# Patient Record
Sex: FEMALE | Race: WHITE | NOT HISPANIC OR LATINO | Employment: OTHER | URBAN - METROPOLITAN AREA
[De-identification: names, ages, dates, MRNs, and addresses within clinical notes are randomized per-mention and may not be internally consistent; named-entity substitution may affect disease eponyms.]

---

## 2017-06-22 ENCOUNTER — OFFICE VISIT (OUTPATIENT)
Dept: FAMILY MEDICINE | Facility: CLINIC | Age: 57
End: 2017-06-22

## 2017-06-22 VITALS
HEIGHT: 68 IN | TEMPERATURE: 98.5 F | HEART RATE: 90 BPM | SYSTOLIC BLOOD PRESSURE: 124 MMHG | DIASTOLIC BLOOD PRESSURE: 86 MMHG | WEIGHT: 180.8 LBS | OXYGEN SATURATION: 99 % | BODY MASS INDEX: 27.4 KG/M2

## 2017-06-22 DIAGNOSIS — R39.15 URINARY URGENCY: ICD-10-CM

## 2017-06-22 DIAGNOSIS — Z00.00 ROUTINE HISTORY AND PHYSICAL EXAMINATION OF ADULT: Primary | ICD-10-CM

## 2017-06-22 DIAGNOSIS — R07.0 THROAT PAIN: ICD-10-CM

## 2017-06-22 DIAGNOSIS — E03.9 ACQUIRED HYPOTHYROIDISM: ICD-10-CM

## 2017-06-22 LAB
% GRANULOCYTES: 59.4 %
HCT VFR BLD AUTO: 39.4 % (ref 35–47)
HEMOGLOBIN: 13.5 G/DL (ref 11.7–15.7)
LYMPHOCYTES NFR BLD AUTO: 32.1 %
MCH RBC QN AUTO: 33.3 PG (ref 26–33)
MCHC RBC AUTO-ENTMCNC: 34.3 G/DL (ref 31–36)
MCV RBC AUTO: 97.4 FL (ref 78–100)
MONOCYTES NFR BLD AUTO: 8.5 %
PLATELET COUNT - QUEST: 170 10^9/L (ref 150–375)
RBC # BLD AUTO: 4.05 10*12/L (ref 3.8–5.2)
S PYO AG THROAT QL IA.RAPID: NORMAL
WBC # BLD AUTO: 9.4 10*9/L (ref 4–11)

## 2017-06-22 PROCEDURE — 87070 CULTURE OTHR SPECIMN AEROBIC: CPT | Performed by: FAMILY MEDICINE

## 2017-06-22 PROCEDURE — 84443 ASSAY THYROID STIM HORMONE: CPT | Mod: 90 | Performed by: FAMILY MEDICINE

## 2017-06-22 PROCEDURE — 88142 CYTOPATH C/V THIN LAYER: CPT | Mod: 90 | Performed by: FAMILY MEDICINE

## 2017-06-22 PROCEDURE — 80048 BASIC METABOLIC PNL TOTAL CA: CPT | Mod: 90 | Performed by: FAMILY MEDICINE

## 2017-06-22 PROCEDURE — 99396 PREV VISIT EST AGE 40-64: CPT | Performed by: FAMILY MEDICINE

## 2017-06-22 PROCEDURE — 87880 STREP A ASSAY W/OPTIC: CPT | Performed by: FAMILY MEDICINE

## 2017-06-22 PROCEDURE — 36415 COLL VENOUS BLD VENIPUNCTURE: CPT | Performed by: FAMILY MEDICINE

## 2017-06-22 PROCEDURE — 84439 ASSAY OF FREE THYROXINE: CPT | Mod: 90 | Performed by: FAMILY MEDICINE

## 2017-06-22 PROCEDURE — 85025 COMPLETE CBC W/AUTO DIFF WBC: CPT | Performed by: FAMILY MEDICINE

## 2017-06-22 NOTE — PATIENT INSTRUCTIONS
Advance Care Planning and Health Care Directives  [place] at [facility] at [phone] or toll free [phone]

## 2017-06-22 NOTE — NURSING NOTE
"Patricia Ramirez is here for a CPX. Fasting: .    Pre-visit Planning  Immunizations -up to date  Colonoscopy -is up to date  Mammogram -is up to date  Asthma test --NA  PHQ2 -is completed today  ANNA 7 -NA  Fall Risk Assessment -NA  CAGE: Compelted Today  Vitals:  BP Cuff left  Arm with regular Cuff  PULSE regular  180 lbs 12.8 oz and 5' 7.5\"  CLASSIFICATION OF OVERWEIGHT AND OBESITY BY BMI                        Obesity Class           BMI(kg/m2)  Underweight                                    < 18.5  Normal                                         18.5-24.9  Overweight                                     25.0-29.9  OBESITY                     I                  30.0-34.9                             II                 35.0-39.9  EXTREME OBESITY             III                >40      Patient's  BMI Body mass index is 27.9 kg/(m^2).      Roomed By: GABRIEL Saleh ([facility])    "

## 2017-06-22 NOTE — PROGRESS NOTES
SUBJECTIVE: Patricia Ramirez is an 56 year old postmenopausal woman who presents for annual gyn exam.   Patient's last menstrual period was 06/20/2010.   Age of menopause: 49   Four normal pregnancies, uncomplicated with normal vaginal deliveries.   History of abnormal Pap smear: no   Regular self breast exam: Yes   History of abnormal mammogram: no       Besides routine health maintenance,  she would like to discuss      Sore throat for three days- rapid strep   Right eye conjunctivitis.   Hypothyroidism- neg AB- recheck thyroid function tests    Health Care Maintenance  Colonoscopy 2009  Mammogram 11/2009  dexa : none to date  Immunizations:up to date      Healthy Habits:  Do you get at least three servings of calcium containing foods daily (dairy, green leafy vegetables, etc.)? yes  Outside of work or daily activities, how many days per week do you exercise for 30 minutes or longer? Regular exercise and activity- biking, walking, mowing  Have you had an eye exam in the past two years? yes  Do you see a dentist twice per year? yes    PHQ-2  Over the last two weeks- Have you been bothered by little interest or pleasure in doing things?  No  Over the last two weeks- Have you been feeling down, depressed, or hopeless?  No  Frustrated with work/     Advanced directive:  encouraged    Social History   Substance Use Topics     Smoking status: Former Smoker     Smokeless tobacco: Never Used      Comment: quit 25-30 ago     Alcohol use 0.0 oz/week     0 drink(s) per week      Comment: social      Centrum silver- no additional   Reviewed orders with patient.  Reviewed health maintenance and updated orders accordingly - Yes      History of abnormal Pap smear: NO - age 30- 65 PAP every 3 years recommended  All Histories reviewed and updated in Epic.      ROS:  C: NEGATIVE for fever, chills, change in weight  I: NEGATIVE for worrisome rashes, moles or lesions  E: NEGATIVE for vision changes or irritation  ENT:  "conjunctivitis, sore throat  R: NEGATIVE for significant cough or SOB  B: NEGATIVE for masses, tenderness or discharge  CV: NEGATIVE for chest pain, palpitations or peripheral edema  GI: NEGATIVE for nausea, abdominal pain, heartburn, or change in bowel habits  : urinary urgency- requests refill of oxybutynin   No vaginal bleeding.  M: NEGATIVE for significant arthralgias or myalgia  N: NEGATIVE for weakness, dizziness or paresthesias  P: NEGATIVE for changes in mood or affect   Endo: weight is stable.    OBJECTIVE:  /86 (BP Location: Left arm, Patient Position: Chair, Cuff Size: Adult Regular)  Pulse 90  Temp 98.5  F (36.9  C) (Oral)  Ht 1.715 m (5' 7.5\")  Wt 82 kg (180 lb 12.8 oz)  LMP 06/20/2010  SpO2 99%  Breastfeeding? No  BMI 27.9 kg/m2  General appearance: Healthy    Skin: Normal. No atypical appearing moles on inspection of trunk and extremities.    Eye: right eye conjunctival redness    External ears  and canals clear bilaterally. TM's normal bilaterally. Nose normal without lesions or discharge. Oropharynx: erythema of the posterior pharynx. Neck supple without palpable adenopathy.    Breasts are symmetric.  No dominant, discrete, fixed  or suspicious masses are noted.  No skin or nipple changes or axillary nodes.      Regular rate and  rhythm. S1 and S2 normal, no murmurs, clicks, gallops or rubs. No edema or JVD. Chest is clear; no wheezes or rales.    The abdomen is soft without tenderness, guarding, mass or organomegaly. Bowel sounds are normal. No CVA tenderness or inguinal adenopathy noted.    Pelvic:  Vagina and vulva are normal.   No palpable uterine or adnexal masses or tenderness.  Pap obtained and pending.    Rectal exam: deferred    Extremities: negative.      COUNSELING:  Reviewed preventive health counseling, as reflected in patient instructions       Regular exercise       Osteoporosis Prevention/Bone Health       Advance Care Planning    ATP III Guidelines  ICSI Preventive " Guidelines    ASSESSMENT/PLAN:  (Z00.00) Routine history and physical examination of adult  (primary encounter diagnosis)  Comment:   Plan: TSH ([facility]), T4 free, VENOUS COLLECTION,         ThinPrep Pap and HPV (mRNA E6/E7)         ([facility]), BASIC METABOLIC PANEL ([facility])            (E03.9) Acquired hypothyroidism  Comment: normal TSH- Levothyroxine refilled at her current dose  Plan: TSH ([facility]), T4 free, VENOUS COLLECTION,         levothyroxine (SYNTHROID/LEVOTHROID) 50 MCG         tablet            (R07.0) Throat pain  Comment:   Plan: RAPID STREP (BFP), CL AFF HEMOGRAM/PLATE/DIFF         (BFP), THROAT CULTURE (BFP)            (R39.15) Urinary urgency  Comment:   Plan: oxybutynin (DITROPAN) 5 MG tablet

## 2017-06-22 NOTE — MR AVS SNAPSHOT
"              After Visit Summary   6/22/2017    Patricia Ramirez    MRN: [id number]           Patient Information     Date Of Birth          1960        Visit Information        Provider Department      6/22/2017 9:00 AM Savana Guillermo MD [facility]        Today's Diagnoses     Routine history and physical examination of adult    -  1    Acquired hypothyroidism        Throat pain        Urinary urgency          Care Instructions    Advance Care Planning and Health Care Directives  [place] at [facility] at [phone] or toll free [phone]             Follow-ups after your visit        Who to contact     If you have questions or need follow up information about today's clinic visit or your schedule please contact [facility] directly at [phone].  Normal or non-critical lab and imaging results will be communicated to you by MyChart, letter or phone within 4 business days after the clinic has received the results. If you do not hear from us within 7 days, please contact the clinic through MyChart or phone. If you have a critical or abnormal lab result, we will notify you by phone as soon as possible.  Submit refill requests through [employer] or call your pharmacy and they will forward the refill request to us. Please allow 3 business days for your refill to be completed.          Additional Information About Your Visit        MyChart Information     [employer] lets you send messages to your doctor, view your test results, renew your prescriptions, schedule appointments and more. To sign up, go to [email or web address] . Click on \"Log in\" on the left side of the screen, which will take you to the Welcome page. Then click on \"Sign up Now\" on the right side of the page.     You will be asked to enter the access code listed below, as well as some personal information. Please follow the directions to create your username and password.     Your " "access code is: [email or web address]  Expires: 2017 10:52 AM     Your access code will  in 90 days. If you need help or a new code, please call your [place] clinic or [phone].        Care EveryWhere ID     This is your Care EveryWhere ID. This could be used by other organizations to access your [place] medical records  [id number]        Your Vitals Were     Pulse Temperature Height Last Period Pulse Oximetry Breastfeeding?    90 98.5  F (36.9  C) (Oral) 1.715 m (5' 7.5\") 2010 99% No    BMI (Body Mass Index)                   27.9 kg/m2            Blood Pressure from Last 3 Encounters:   17 124/86   16 120/82   16 110/76    Weight from Last 3 Encounters:   17 82 kg (180 lb 12.8 oz)   16 81.8 kg (180 lb 6.4 oz)   16 81.3 kg (179 lb 3.2 oz)              We Performed the Following     BASIC METABOLIC PANEL ([facility])     CL AFF HEMOGRAM/PLATE/DIFF (B[facility])     RAPID STREP (BFP)     T4 free     ThinPrep Pap and HPV (mRNA E6/E7){HPV-REFLEX} ([facility])     THROAT CULTURE (BFP)     TSH ([facility])     VENOUS COLLECTION          Today's Medication Changes          These changes are accurate as of: 17 11:59 PM.  If you have any questions, ask your nurse or doctor.               Start taking these medicines.        Dose/Directions    oxybutynin 5 MG tablet   Commonly known as:  DITROPAN   Used for:  Urinary urgency   Started by:  Savana Guillermo MD        Dose:  5 mg   Take 1 tablet (5 mg) by mouth 2 times daily   Quantity:  180 tablet   Refills:  3            Where to get your medicines      These medications were sent to [facility] [address] [address] AT [facility] [address] [address], [facility] [address]    Hours:  24-hours Phone:  [phone]     levothyroxine 50 MCG tablet    oxybutynin 5 MG tablet                Primary Care Provider Office Phone # Fax #    Savana Guillermo [phone]       " [facility] [address]  [facility] [address]        Equal Access to Services     RAYMOND TYSON : Hadii juan manuel agee hadbrandto Sojuanali, waaxda luqadaha, qaybta kaalmada aderosemary, tavo linden milimarvin morenoaimee griceldajuju carole baez. So [facility] [phone].    ATENCIÓN: Si habla español, tiene a fatima disposición servicios gratuitos de asistencia lingüística. Llame al [phone].    We comply with applicable federal civil rights laws and [place] laws. We do not discriminate on the basis of race, color, national origin, age, disability sex, sexual orientation or gender identity.            Thank you!     Thank you for choosing [facility]  for your care. Our goal is always to provide you with excellent care. Hearing back from our patients is one way we can continue to improve our services. Please take a few minutes to complete the written survey that you may receive in the mail after your visit with us. Thank you!             Your Updated Medication List - Protect others around you: Learn how to safely use, store and throw away your medicines at www.disposemymeds.org.          This list is accurate as of: 6/22/17 11:59 PM.  Always use your most recent med list.                   Brand Name Dispense Instructions for use Diagnosis    clobetasol 0.05 % cream    TEMOVATE    60 g    Apply  topically 2 times daily. (should not be used on face or in groin)    Contact dermatitis due to poison ivy       Glucosamine HCl--250 MG Tabs      1 TABLET DAILY        levothyroxine 50 MCG tablet    SYNTHROID/LEVOTHROID    90 tablet    Take 1 tablet (50 mcg) by mouth daily    Acquired hypothyroidism       MULTIPLE vitamin  S/WOMENS Tabs      1 TABLET DAILY        oxybutynin 10 MG 24 hr tablet    DITROPAN-XL     1 TABLET DAILY        oxybutynin 5 MG tablet    DITROPAN    180 tablet    Take 1 tablet (5 mg) by mouth 2 times daily    Urinary urgency

## 2017-06-23 LAB
BUN SERPL-MCNC: 17 MG/DL (ref 7–25)
BUN/CREATININE RATIO: NORMAL (CALC) (ref 6–22)
CALCIUM SERPL-MCNC: 9.3 MG/DL (ref 8.6–10.4)
CHLORIDE SERPLBLD-SCNC: 108 MMOL/L (ref 98–110)
CO2 SERPL-SCNC: 26 MMOL/L (ref 20–31)
CREAT SERPL-MCNC: 0.95 MG/DL (ref 0.5–1.05)
EGFR AFRICAN AMERICAN - QUEST: 77 ML/MIN/1.73M2
GFR SERPL CREATININE-BSD FRML MDRD: 66 ML/MIN/1.73M2
GLUCOSE - QUEST: 97 MG/DL (ref 65–99)
POTASSIUM SERPL-SCNC: 4.3 MMOL/L (ref 3.5–5.3)
SODIUM SERPL-SCNC: 140 MMOL/L (ref 135–146)
T4, FREE, NON-DIALYSIS - QUEST: 1.2 NG/DL (ref 0.8–1.8)
TSH SERPL-ACNC: 2.45 MIU/L (ref 0.4–4.5)

## 2017-06-24 LAB — THROAT CULTURE: NORMAL

## 2017-06-26 RX ORDER — LEVOTHYROXINE SODIUM 50 UG/1
50 TABLET ORAL DAILY
Qty: 90 TABLET | Refills: 3 | Status: SHIPPED | OUTPATIENT
Start: 2017-06-26 | End: 2018-06-29

## 2017-06-26 RX ORDER — OXYBUTYNIN CHLORIDE 5 MG/1
5 TABLET ORAL 2 TIMES DAILY
Qty: 180 TABLET | Refills: 3 | Status: SHIPPED | OUTPATIENT
Start: 2017-06-26 | End: 2018-06-29

## 2017-06-27 LAB
CLINICAL HISTORY - QUEST: NORMAL
CYTOTECHNOLOGIST - QUEST: NORMAL
DESCRIPTIVE DIAGNOSIS - QUEST: NORMAL
LAST PAP DX - QUEST: NORMAL
LMP - QUEST: NORMAL
PREV BX DX - QUEST: NORMAL
SOURCE: NORMAL
STATEMENT OF ADEQUACY - QUEST: NORMAL

## 2017-07-05 DIAGNOSIS — E03.9 ACQUIRED HYPOTHYROIDISM: ICD-10-CM

## 2017-07-05 RX ORDER — LEVOTHYROXINE SODIUM 50 UG/1
TABLET ORAL
Qty: 90 TABLET | Refills: 0 | OUTPATIENT
Start: 2017-07-05

## 2017-12-04 ENCOUNTER — HOSPITAL ENCOUNTER (OUTPATIENT)
Dept: MAMMOGRAPHY | Facility: CLINIC | Age: 57
Discharge: HOME | End: 2017-12-04
Attending: FAMILY MEDICINE | Admitting: FAMILY MEDICINE
Payer: COMMERCIAL

## 2017-12-04 DIAGNOSIS — Z12.31 VISIT FOR SCREENING MAMMOGRAM: ICD-10-CM

## 2017-12-04 PROCEDURE — G0202 SCR MAMMO BI INCL CAD: HCPCS

## 2017-12-15 ENCOUNTER — ALLIED HEALTH/NURSE VISIT (OUTPATIENT)
Dept: FAMILY MEDICINE | Facility: CLINIC | Age: 57
End: 2017-12-15

## 2017-12-15 DIAGNOSIS — Z23 NEED FOR VACCINATION: Primary | ICD-10-CM

## 2017-12-15 PROCEDURE — 90686 IIV4 VACC NO PRSV 0.5 ML IM: CPT | Performed by: FAMILY MEDICINE

## 2017-12-15 PROCEDURE — 90471 IMMUNIZATION ADMIN: CPT | Performed by: FAMILY MEDICINE

## 2018-06-29 ENCOUNTER — OFFICE VISIT (OUTPATIENT)
Dept: FAMILY MEDICINE | Facility: CLINIC | Age: 58
End: 2018-06-29

## 2018-06-29 VITALS
HEIGHT: 68 IN | WEIGHT: 178 LBS | HEART RATE: 74 BPM | DIASTOLIC BLOOD PRESSURE: 84 MMHG | TEMPERATURE: 98.1 F | BODY MASS INDEX: 26.98 KG/M2 | OXYGEN SATURATION: 98 % | SYSTOLIC BLOOD PRESSURE: 126 MMHG

## 2018-06-29 DIAGNOSIS — E03.9 ACQUIRED HYPOTHYROIDISM: ICD-10-CM

## 2018-06-29 DIAGNOSIS — Z00.00 ROUTINE GENERAL MEDICAL EXAMINATION AT A HEALTH CARE FACILITY: Primary | ICD-10-CM

## 2018-06-29 DIAGNOSIS — Z80.3 FAMILY HISTORY OF MALIGNANT NEOPLASM OF BREAST: ICD-10-CM

## 2018-06-29 DIAGNOSIS — R39.15 URINARY URGENCY: ICD-10-CM

## 2018-06-29 LAB — GLUCOSE SERPL-MCNC: 94 MG/DL (ref 60–99)

## 2018-06-29 PROCEDURE — 36415 COLL VENOUS BLD VENIPUNCTURE: CPT | Performed by: FAMILY MEDICINE

## 2018-06-29 PROCEDURE — 84443 ASSAY THYROID STIM HORMONE: CPT | Mod: 90 | Performed by: FAMILY MEDICINE

## 2018-06-29 PROCEDURE — 84439 ASSAY OF FREE THYROXINE: CPT | Mod: 90 | Performed by: FAMILY MEDICINE

## 2018-06-29 PROCEDURE — 80061 LIPID PANEL: CPT | Mod: 90 | Performed by: FAMILY MEDICINE

## 2018-06-29 PROCEDURE — 99396 PREV VISIT EST AGE 40-64: CPT | Performed by: FAMILY MEDICINE

## 2018-06-29 PROCEDURE — 82947 ASSAY GLUCOSE BLOOD QUANT: CPT | Performed by: FAMILY MEDICINE

## 2018-06-29 RX ORDER — LEVOTHYROXINE SODIUM 50 UG/1
50 TABLET ORAL DAILY
Qty: 90 TABLET | Refills: 3 | Status: CANCELLED | OUTPATIENT
Start: 2018-06-29

## 2018-06-29 RX ORDER — OXYBUTYNIN CHLORIDE 5 MG/1
5 TABLET ORAL 2 TIMES DAILY
Qty: 180 TABLET | Refills: 3 | Status: CANCELLED | OUTPATIENT
Start: 2018-06-29

## 2018-06-29 NOTE — LETTER
July 2, 2018      Patricia Ramirez  [address] [facility] [address]        Dear ,    We are writing to inform you of your test results.    Normal thyroid function: Levothyroxine was refilled at your current dose  Normal lipid profile/ LDL has increased slightly from last year  Normal blood sugar    Resulted Orders   TSH ([facility])   Result Value Ref Range    TSH 2.55 0.40 - 4.50 mIU/L   T4 free   Result Value Ref Range    T4 Free, Non-Dialysis 1.4 0.8 - 1.8 ng/dL   Glucose Fasting (BFP)   Result Value Ref Range    Glucose 94 60 - 99 mg/dL   Lipid Profile   Result Value Ref Range    Cholesterol 177 <200 mg/dL    HDL Cholesterol 58 >50 mg/dL    Triglycerides 53 <150 mg/dL    LDL Cholesterol Calculated 105 (H) mg/dL (calc)      Comment:      Reference range: <100     Desirable range <100 mg/dL for primary prevention;    <70 mg/dL for patients with CHD or diabetic patients   with > or = 2 CHD risk factors.     LDL-C is now calculated using the Brian-Viky   calculation, which is a validated novel method providing   better accuracy than the Friedewald equation in the   estimation of LDL-C.   Brian SS et al. DENISSE. 2013;310(19): 3279-0619   (http://education.[employer].com/faq/DXH532)      Cholesterol/HDL Ratio 3.1 <5.0 (calc)    Non HDL Cholesterol 119 <130 mg/dL (calc)      Comment:      For patients with diabetes plus 1 major ASCVD risk   factor, treating to a non-HDL-C goal of <100 mg/dL   (LDL-C of <70 mg/dL) is considered a therapeutic   option.         If you have any questions or concerns, please call the clinic at the number listed above.       Sincerely,        Savana Guillermo MD

## 2018-06-29 NOTE — NURSING NOTE
Patient is here for a full physical exam.    Pre-Visit Screening :  Immunizations : up to date  Colon Screening : is up to date  Mammogram: up to date   Asthma Action Test/Plan : no concerns  PHQ9 :  PHQ-2 done today   GAD7 :  No concerns  Patient's  BMI Body mass index is 27.47 kg/(m^2).  Questioned patient about current smoking habits.  Pt. quit smoking some time ago.  OK to leave a detailed voice message regarding today's visit Yes, phone # [phone]      ETOH screening:  Questions:  1-How often do you have a drink containing alcohol?                             1 times per week(s)  2-How many drinks containing alcohol do you have on a typical day when you are         Drinking?                              1   3- How often do you have 5 or more drinks on one occasion?                              Never

## 2018-06-29 NOTE — MR AVS SNAPSHOT
After Visit Summary   6/29/2018    Patricia Ramirez    MRN: [id number]           Patient Information     Date Of Birth          1960        Visit Information        Provider Department      6/29/2018 9:00 AM Savana Guillermo MD [facility]        Today's Diagnoses     Routine general medical examination at a health care facility    -  1    Acquired hypothyroidism        Urinary urgency        Family history of malignant neoplasm of breast          Care Instructions    New Recombinant shingles vaccine (Shingrix): call your insurance for information on cost    Due for mammogram in December            Follow-ups after your visit        Additional Services     GENETICS REFERRAL       Your provider has referred you to   Genetic counseling:  Cheyenne Hernandez  [facility]  [address] [place] [id number]  [phone]   [email or web address]  Please be aware that coverage of these services is subject to the terms and limitations of your health insurance plan.  Call member services at your health plan with any benefit or coverage questions.      Please bring the following with you to your appointment:    (1) Any X-Rays, CTs or MRIs which have been performed.  Contact the facility where they were done to arrange for  prior to your scheduled appointment.   (2) List of current medications   (3) This referral request   (4) Any documents/labs given to you for this referral                  Who to contact     If you have questions or need follow up information about today's clinic visit or your schedule please contact [facility] directly at [phone].  Normal or non-critical lab and imaging results will be communicated to you by MyChart, letter or phone within 4 business days after the clinic has received the results. If you do not hear from us within 7 days, please contact the clinic through MyChart or phone. If you have a  "critical or abnormal lab result, we will notify you by phone as soon as possible.  Submit refill requests through [employer] or call your pharmacy and they will forward the refill request to us. Please allow 3 business days for your refill to be completed.          Additional Information About Your Visit        Care EveryWhere ID     This is your Care EveryWhere ID. This could be used by other organizations to access your [place] medical records  [id number]        Your Vitals Were     Pulse Temperature Height Last Period Pulse Oximetry BMI (Body Mass Index)    74 98.1  F (36.7  C) (Oral) 1.715 m (5' 7.5\") 06/20/2010 98% 27.47 kg/m2       Blood Pressure from Last 3 Encounters:   06/29/18 126/84   06/22/17 124/86   06/16/16 120/82    Weight from Last 3 Encounters:   06/29/18 80.7 kg (178 lb)   06/22/17 82 kg (180 lb 12.8 oz)   06/16/16 81.8 kg (180 lb 6.4 oz)              We Performed the Following     GENETICS REFERRAL     Glucose Fasting (BFP)     Lipid Profile     T4 free     TSH ([facility])     VENOUS COLLECTION          Today's Medication Changes          These changes are accurate as of 6/29/18  9:50 AM.  If you have any questions, ask your nurse or doctor.               Stop taking these medicines if you haven't already. Please contact your care team if you have questions.     oxybutynin 10 MG 24 hr tablet   Commonly known as:  DITROPAN-XL   Stopped by:  Savana Guillermo MD                    Primary Care Provider Office Phone # Fax #    Savana Guillermo [phone] [address] [place] [address]        Equal Access to Services     [facility]: Hadedmundo Lozano, elan yee, tavo oreilly. So [facility] [phone].    ATENCIÓN: Si habla español, tiene a fatima disposición servicios gratuitos de asistencia lingüística. Llame al [phone].    We comply with applicable federal civil rights laws and " [place] laws. We do not discriminate on the basis of race, color, national origin, age, disability, sex, sexual orientation, or gender identity.            Thank you!     Thank you for choosing [facility]  for your care. Our goal is always to provide you with excellent care. Hearing back from our patients is one way we can continue to improve our services. Please take a few minutes to complete the written survey that you may receive in the mail after your visit with us. Thank you!             Your Updated Medication List - Protect others around you: Learn how to safely use, store and throw away your medicines at www.disposemymeds.org.          This list is accurate as of 6/29/18  9:50 AM.  Always use your most recent med list.                   Brand Name Dispense Instructions for use Diagnosis    clobetasol 0.05 % cream    TEMOVATE    60 g    Apply  topically 2 times daily. (should not be used on face or in groin)    Contact dermatitis due to poison ivy       Glucosamine HCl--250 MG Tabs      1 TABLET DAILY        levothyroxine 50 MCG tablet    SYNTHROID/LEVOTHROID    90 tablet    Take 1 tablet (50 mcg) by mouth daily    Acquired hypothyroidism       MULTIPLE vitamin  S/WOMENS Tabs      1 TABLET DAILY        oxybutynin 5 MG tablet    DITROPAN    180 tablet    Take 1 tablet (5 mg) by mouth 2 times daily    Urinary urgency

## 2018-06-29 NOTE — PATIENT INSTRUCTIONS
New Recombinant shingles vaccine (Shingrix): call your insurance for information on cost    Due for mammogram in December

## 2018-06-29 NOTE — PROGRESS NOTES
Chief Complaint: Patricia Ramirez is an 57 year old woman who presents for preventive health visit.      Besides routine health maintenance,  she would like to discuss thyroid replacement.   She read an article in the tribune that low dose repalcement may not be indicated      Healthy Habits:  Do you get at least three servings of calcium containing foods daily (dairy, green leafy vegetables, etc.)? yes  Outside of work or daily activities, how many days per week do you exercise for 30 minutes or longer? Regular exercise- biking, walking  Have you had an eye exam in the past two years? yes  Do you see a dentist twice per year? yes    PHQ-2  Over the last two weeks- Have you been bothered by little interest or pleasure in doing things?  No  Over the last two weeks- Have you been feeling down, depressed, or hopeless?  No     Advanced directive:encouraged    Health Care Maintenance:  dexa scan: none todate  Social History   Substance Use Topics     Smoking status: Former Smoker     Smokeless tobacco: Never Used      Comment: quit 25-30 ago     Alcohol use 0.0 oz/week     0 drink(s) per week      Comment: social          Reviewed orders with patient.  Reviewed health maintenance and updated orders accordingly - Yes      History of abnormal Pap smear: NO - age 30- 65 PAP every 3 years recommended  All Histories reviewed and updated in [facility].      ROS:  CONSTITUTIONAL: NEGATIVE for fever, chills, change in weight  INTEGUMENTARY/SKIN: NEGATIVE for worrisome rashes, moles or lesions  Gets contact derm- poison ivy every spring  EYES: NEGATIVE for vision changes or irritation  ENT: NEGATIVE for ear, mouth and throat problems  RESP: NEGATIVE for significant cough or SOB  BREAST: NEGATIVE for masses, tenderness or discharge  CV: NEGATIVE for chest pain, palpitations or peripheral edema  GI: NEGATIVE for nausea, abdominal pain, heartburn, or change in bowel habits  : NEGATIVE for unusual urinary or vaginal symptoms. No vaginal  "bleeding.  MUSCULOSKELETAL: NEGATIVE for significant arthralgias or myalgia  NEURO: NEGATIVE for weakness, dizziness or paresthesias  PSYCHIATRIC: NEGATIVE for changes in mood or affect     No ibuprofen or aleve use-  Mammogram : due in December    Mother and grandmother with history of cancer (mother breast and kidney)- grandmother breast  She would like to see a genetic counselor      OBJECTIVE:  /84 (BP Location: Right arm, Patient Position: Sitting, Cuff Size: Adult Regular)  Pulse 74  Temp 98.1  F (36.7  C) (Oral)  Ht 1.715 m (5' 7.5\")  Wt 80.7 kg (178 lb)  LMP 06/20/2010  SpO2 98%  BMI 27.47 kg/m2  General appearance: Healthy    Skin: Normal. No atypical appearing moles on inspection of trunk and extremities.    External ears  and canals clear bilaterally. TM's normal bilaterally. Nose normal without lesions or discharge. Oropharynx normal. Neck supple without palpable adenopathy.    Breasts are symmetric.  No dominant, discrete, fixed  or suspicious masses are noted.  No skin or nipple changes or axillary nodes.     Regular rate and  rhythm. S1 and S2 normal, no murmurs, clicks, gallops or rubs. No edema or JVD. Chest is clear; no wheezes or rales.    The abdomen is soft without tenderness, guarding, mass or organomegaly. Bowel sounds are normal. No CVA tenderness or inguinal adenopathy noted.    Pelvic: Deferred    Rectal exam: deferred    Extremities: negative.      COUNSELING:  Reviewed preventive health counseling, as reflected in patient instructions  Special attention given to:        Regular exercise       Healthy diet/nutrition       Osteoporosis Prevention/Bone Health       Advance Care Planning    ATP III Guidelines  ICSI Preventive Guidelines    ASSESSMENT/PLAN:  (Z00.00) Routine general medical examination at a health care facility  (primary encounter diagnosis)  Comment:   Plan: TSH ([facility]), T4 free, VENOUS COLLECTION,         Glucose Fasting (BFP), Lipid Profile            (E03.9) " Acquired hypothyroidism  Comment:   Plan: TSH ([facility]), T4 free, VENOUS COLLECTION,         Glucose Fasting (BFP), levothyroxine         (SYNTHROID/LEVOTHROID) 50 MCG tablet            (R39.15) Urinary urgency  Comment:   Plan: oxybutynin (DITROPAN) 5 MG tablet            (Z80.3) Family history of malignant neoplasm of breast  Comment:   Plan: GENETICS REFERRAL

## 2018-06-30 LAB
CHOLEST SERPL-MCNC: 177 MG/DL
CHOLEST/HDLC SERPL: 3.1 (CALC)
HDLC SERPL-MCNC: 58 MG/DL
LDLC SERPL CALC-MCNC: 105 MG/DL (CALC)
NONHDLC SERPL-MCNC: 119 MG/DL (CALC)
T4, FREE, NON-DIALYSIS - QUEST: 1.4 NG/DL (ref 0.8–1.8)
TRIGL SERPL-MCNC: 53 MG/DL
TSH SERPL-ACNC: 2.55 MIU/L (ref 0.4–4.5)

## 2018-07-02 RX ORDER — LEVOTHYROXINE SODIUM 50 UG/1
50 TABLET ORAL DAILY
Qty: 90 TABLET | Refills: 3 | Status: SHIPPED | OUTPATIENT
Start: 2018-07-02 | End: 2019-07-05

## 2018-07-02 RX ORDER — OXYBUTYNIN CHLORIDE 5 MG/1
5 TABLET ORAL 2 TIMES DAILY
Qty: 180 TABLET | Refills: 3 | Status: SHIPPED | OUTPATIENT
Start: 2018-07-02 | End: 2019-07-05

## 2018-10-05 ENCOUNTER — ALLIED HEALTH/NURSE VISIT (OUTPATIENT)
Dept: FAMILY MEDICINE | Facility: CLINIC | Age: 58
End: 2018-10-05

## 2018-10-05 DIAGNOSIS — Z23 NEED FOR VACCINATION: Primary | ICD-10-CM

## 2018-10-05 PROCEDURE — 90471 IMMUNIZATION ADMIN: CPT | Performed by: FAMILY MEDICINE

## 2018-10-05 PROCEDURE — 90686 IIV4 VACC NO PRSV 0.5 ML IM: CPT | Performed by: FAMILY MEDICINE

## 2018-10-05 NOTE — MR AVS SNAPSHOT
After Visit Summary   10/5/2018    Patricia Ramirez    MRN: [id number]           Patient Information     Date Of Birth          1960        Visit Information        Provider Department      10/5/2018 1:30 PM Savana Guillermo MD [facility]        Today's Diagnoses     Need for vaccination    -  1       Follow-ups after your visit        Your next 10 appointments already scheduled     Dec 05, 2018  8:45 AM CST   MA SCREENING DIGITAL BILATERAL with [id number]   [facility] ([facility])    [address], [address]  [facility] [address]   [phone]           How do I prepare for my exam? (Food and drink instructions) No Food and Drink Restrictions.  How do I prepare for my exam? (Other instructions) Do not use any powder, lotion or deodorant under your arms or on your breast. If you do, we will ask you to remove it before your exam.  What should I wear: Wear comfortable, two-piece clothing.  How long does the exam take: Most scans will take 15 minutes.  What should I bring: Bring any previous mammograms from other facilities or have them mailed to the breast center.  Do I need a :  No  is needed.  What do I need to tell my doctor: If you have any allergies, tell your care team.  What should I do after the exam: No restrictions, You may resume normal activities.  What is this test: This test is an x-ray of the breast to look for breast disease. The breast is pressed between two plates to flatten and spread the tissue. An X-ray is taken of the breast from different angles.  Who should I call with questions: If you have any questions, please call the Imaging Department where you will have your exam. Directions, parking instructions, and other information is available on our website, [email or web address].  Other information about my exam Three-dimensional (3D) mammograms are available at [facility] locations in [facility]  "[facility], [facility], [place], [facility] and [place].  Health locations include [place] and [facility] in [place].  Benefits of 3D mammograms include * Improved rate of cancer detection * Decreases your chance of having to go back for more tests, which means fewer: * \"False-positive\" results (This means that there is an abnormal area but it isn't cancer.) * Invasive testing procedures, such as a biopsy or surgery * Can provide clearer images of the breast if you have dense breast tissue.  *3D mammography is an optional exam that anyone can have with a 2D mammogram. It doesn't replace or take the place of a 2D mammogram. 2D mammograms remain an effective screening test for all women.  Not all insurance companies cover the cost of a 3D mammogram. Check with your insurance. Three-dimensional (3D) mammograms are available at [facility] locations in [facility], [facility], [facility], [place], [place], and [place]. Health locations include [place] and [facility] in [place]. Benefits of 3D mammograms include: - Improved rate of cancer detection - Decreases your chance of having to go back for more tests, which means fewer: - \"False-positive\" results (This means that there is an abnormal area but it isn't cancer.) - Invasive testing procedures, such as a biopsy or surgery - Can provide clearer images of the breast if you have dense breast tissue. 3D mammography is an optional exam that anyone can have with a 2D mammogram. It doesn't replace or take the place of a 2D mammogram. 2D mammograms remain an effective screening test for all women.  Not all insurance companies cover the cost of a 3D mammogram. Check with your insurance.              Who to contact     If you have questions or need follow up information about today's clinic visit or your schedule please contact [facility] directly at " [phone].  Normal or non-critical lab and imaging results will be communicated to you by MyChart, letter or phone within 4 business days after the clinic has received the results. If you do not hear from us within 7 days, please contact the clinic through MyChart or phone. If you have a critical or abnormal lab result, we will notify you by phone as soon as possible.  Submit refill requests through [employer] ([employer])hart or call your pharmacy and they will forward the refill request to us. Please allow 3 business days for your refill to be completed.          Additional Information About Your Visit        Care EveryWhere ID     This is your Care EveryWhere ID. This could be used by other organizations to access your [facility] medical records  [id number]        Your Vitals Were     Last Period                   06/20/2010            Blood Pressure from Last 3 Encounters:   06/29/18 126/84   06/22/17 124/86   06/16/16 120/82    Weight from Last 3 Encounters:   06/29/18 80.7 kg (178 lb)   06/22/17 82 kg (180 lb 12.8 oz)   06/16/16 81.8 kg (180 lb 6.4 oz)              We Performed the Following     HC FLU VAC PRESRV FREE QUAD SPLIT VIR 3+YRS IM     VACCINE ADMINISTRATION, INITIAL        Primary Care Provider Office Phone # Fax #    Savana Guillermo [phone]       [address] [address]        Equal Access to Services     [facility] : Hadii aad ku hadasho Sojuanali, waaxda luqadaha, qaybta kaalmada adeaimeeyajeni, tavo lam . So [facility] [phone].    ATENCIÓN: Si habla español, tiene a fatima disposición servicios gratuitos de asistencia lingüística. Llame al [phone].    We comply with applicable federal civil rights laws and [place] laws. We do not discriminate on the basis of race, color, national origin, age, disability, sex, sexual orientation, or gender identity.            Thank you!     Thank you for choosing [facility]   for your care. Our goal is always to provide you with excellent care. Hearing back from our patients is one way we can continue to improve our services. Please take a few minutes to complete the written survey that you may receive in the mail after your visit with us. Thank you!             Your Updated Medication List - Protect others around you: Learn how to safely use, store and throw away your medicines at www.disposemymeds.org.          This list is accurate as of 10/5/18  1:39 PM.  Always use your most recent med list.                   Brand Name Dispense Instructions for use Diagnosis    clobetasol 0.05 % cream    TEMOVATE    60 g    Apply  topically 2 times daily. (should not be used on face or in groin)    Contact dermatitis due to poison ivy       Glucosamine HCl--250 MG Tabs      1 TABLET DAILY        levothyroxine 50 MCG tablet    SYNTHROID/LEVOTHROID    90 tablet    Take 1 tablet (50 mcg) by mouth daily    Acquired hypothyroidism       MULTIPLE vitamin  S/WOMENS Tabs      1 TABLET DAILY        oxybutynin 5 MG tablet    DITROPAN    180 tablet    Take 1 tablet (5 mg) by mouth 2 times daily    Urinary urgency

## 2018-12-05 ENCOUNTER — HOSPITAL ENCOUNTER (OUTPATIENT)
Dept: MAMMOGRAPHY | Facility: CLINIC | Age: 58
Discharge: HOME | End: 2018-12-05
Attending: FAMILY MEDICINE | Admitting: FAMILY MEDICINE
Payer: COMMERCIAL

## 2018-12-05 DIAGNOSIS — Z12.31 SCREENING MAMMOGRAM, ENCOUNTER FOR: ICD-10-CM

## 2018-12-05 PROCEDURE — 77067 SCR MAMMO BI INCL CAD: CPT

## 2019-02-05 ENCOUNTER — TRANSFERRED RECORDS (OUTPATIENT)
Dept: FAMILY MEDICINE | Facility: CLINIC | Age: 59
End: 2019-02-05

## 2019-02-11 ENCOUNTER — TRANSFERRED RECORDS (OUTPATIENT)
Dept: FAMILY MEDICINE | Facility: CLINIC | Age: 59
End: 2019-02-11

## 2019-06-07 ENCOUNTER — TRANSFERRED RECORDS (OUTPATIENT)
Dept: FAMILY MEDICINE | Facility: CLINIC | Age: 59
End: 2019-06-07

## 2019-07-03 ENCOUNTER — OFFICE VISIT (OUTPATIENT)
Dept: FAMILY MEDICINE | Facility: CLINIC | Age: 59
End: 2019-07-03

## 2019-07-03 VITALS
BODY MASS INDEX: 26.52 KG/M2 | OXYGEN SATURATION: 99 % | HEIGHT: 68 IN | DIASTOLIC BLOOD PRESSURE: 78 MMHG | HEART RATE: 78 BPM | RESPIRATION RATE: 20 BRPM | SYSTOLIC BLOOD PRESSURE: 116 MMHG | WEIGHT: 175 LBS

## 2019-07-03 DIAGNOSIS — E03.9 ACQUIRED HYPOTHYROIDISM: ICD-10-CM

## 2019-07-03 DIAGNOSIS — R39.15 URINARY URGENCY: ICD-10-CM

## 2019-07-03 DIAGNOSIS — L23.7 CONTACT DERMATITIS DUE TO POISON IVY: ICD-10-CM

## 2019-07-03 DIAGNOSIS — Z23 NEED FOR VACCINATION: ICD-10-CM

## 2019-07-03 DIAGNOSIS — Z00.00 ENCOUNTER FOR PHYSICAL EXAMINATION: Primary | ICD-10-CM

## 2019-07-03 LAB
BUN SERPL-MCNC: 17 MG/DL (ref 7–25)
BUN/CREATININE RATIO: 20.2 (ref 6–22)
CALCIUM SERPL-MCNC: 9.8 MG/DL (ref 8.6–10.3)
CHLORIDE SERPLBLD-SCNC: 105.1 MMOL/L (ref 98–110)
CO2 SERPL-SCNC: 27.8 MMOL/L (ref 20–32)
CREAT SERPL-MCNC: 0.84 MG/DL (ref 0.7–1.18)
ERYTHROCYTE [DISTWIDTH] IN BLOOD BY AUTOMATED COUNT: 12.2 %
GLUCOSE SERPL-MCNC: 85 MG/DL (ref 60–99)
HCT VFR BLD AUTO: 42.7 % (ref 35–47)
HEMOGLOBIN: 13.7 G/DL (ref 11.7–15.7)
MCH RBC QN AUTO: 31.6 PG (ref 26–33)
MCHC RBC AUTO-ENTMCNC: 32.1 G/DL (ref 31–36)
MCV RBC AUTO: 98.6 FL (ref 78–100)
PLATELET COUNT - QUEST: 272 10^9/L (ref 150–375)
POTASSIUM SERPL-SCNC: 4.67 MMOL/L (ref 3.5–5.3)
RBC # BLD AUTO: 4.33 10*12/L (ref 3.8–5.2)
SODIUM SERPL-SCNC: 141 MMOL/L (ref 135–146)
WBC # BLD AUTO: 8 10*9/L (ref 4–11)

## 2019-07-03 PROCEDURE — 80048 BASIC METABOLIC PNL TOTAL CA: CPT | Performed by: FAMILY MEDICINE

## 2019-07-03 PROCEDURE — 85027 COMPLETE CBC AUTOMATED: CPT | Performed by: FAMILY MEDICINE

## 2019-07-03 PROCEDURE — 90471 IMMUNIZATION ADMIN: CPT | Performed by: FAMILY MEDICINE

## 2019-07-03 PROCEDURE — 99396 PREV VISIT EST AGE 40-64: CPT | Mod: 25 | Performed by: FAMILY MEDICINE

## 2019-07-03 PROCEDURE — 36415 COLL VENOUS BLD VENIPUNCTURE: CPT | Performed by: FAMILY MEDICINE

## 2019-07-03 PROCEDURE — 90714 TD VACC NO PRESV 7 YRS+ IM: CPT | Performed by: FAMILY MEDICINE

## 2019-07-03 SDOH — HEALTH STABILITY: MENTAL HEALTH: HOW MANY STANDARD DRINKS CONTAINING ALCOHOL DO YOU HAVE ON A TYPICAL DAY?: 1 OR 2

## 2019-07-03 SDOH — HEALTH STABILITY: MENTAL HEALTH: HOW OFTEN DO YOU HAVE 6 OR MORE DRINKS ON ONE OCCASION?: NEVER

## 2019-07-03 SDOH — HEALTH STABILITY: MENTAL HEALTH: HOW OFTEN DO YOU HAVE A DRINK CONTAINING ALCOHOL?: 2-4 TIMES A MONTH

## 2019-07-03 ASSESSMENT — MIFFLIN-ST. JEOR: SCORE: 1412.29

## 2019-07-03 NOTE — PATIENT INSTRUCTIONS
shingrix vaccine recommended- series of two shots over 2-6 months     Check amount of Vitamin D in Centrum  1000 IU or 250 mg D3

## 2019-07-03 NOTE — LETTER
July 5, 2019      Patricia Ramirez  [address] [facility] [address]        Dear ,    We are writing to inform you of your test results.    Thyroid function tests are in the therapeutic range- Levothyroxine was refilled at your current dose for one year.    Your recent basic profile was Normal including blood sugar, potassium and kidney function tests    Normal blood count including hemoglobin    Resulted Orders   TSH ([facility])   Result Value Ref Range    TSH 2.89 0.40 - 4.50 mIU/L   T4 free   Result Value Ref Range    T4 Free, Non-Dialysis 1.1 0.8 - 1.8 ng/dL   Basic Metabolic Panel (BFP)   Result Value Ref Range    Carbon Dioxide 27.8 20 - 32 mmol/L    Creatinine 0.84 0.70 - 1.18 mg/dL    Glucose 85 60 - 99 mg/dL    Sodium 141.0 135 - 146 mmol/L    Potassium 4.67 3.5 - 5.3 mmol/L    Chloride 105.1 98 - 110 mmol/L    Urea Nitrogen 17 7 - 25 mg/dL    Calcium 9.8 8.6 - 10.3 mg/dL    BUN/Creatinine Ratio 20.2 6 - 22   HEMOGRAM/PLATELET (BFP)   Result Value Ref Range    WBC 8.0 4.0 - 11 10*9/L    RBC Count 4.33 3.8 - 5.2 10*12/L    Hemoglobin 13.7 11.7 - 15.7 g/dL    Hematocrit 42.7 35.0 - 47.0 %    MCV 98.6 78 - 100 fL    MCH 31.6 26 - 33 pg    MCHC 32.1 31 - 36 g/dL    RDW 12.2 %    Platelet Count 272 150 - 375 10^9/L       If you have any questions or concerns, please call the clinic at the number listed above.       Sincerely,        Savana Guillermo MD

## 2019-07-03 NOTE — NURSING NOTE
Patient is here for a full physical exam.    Pre-Visit Screening :  Immunizations : not up to date - due for tetanus   Colon Screening : is up to date  Mammogram: up to date   Asthma Action Test/Plan : No concerns  PHQ9 :  PHQ-2 done today   GAD7 :  No concerns  Patient's  BMI There is no height or weight on file to calculate BMI.  Questioned patient about current smoking habits.  Pt. quit smoking some time ago.  OK to leave a detailed voice message regarding today's visit Yes, phone # [phone]

## 2019-07-03 NOTE — PROGRESS NOTES
Chief Complaint: Patricia Ramirez is an 58 year old woman who presents for preventive health visit.      Besides routine health maintenance,  she would like to discuss :.     Recheck of thyroid function tests and refill of Levothyroxine    Refill of oxybutynin: good response (urinary frequency and urgency)    Health Care Maintenance  shingrix vaccine  Pap is up to date    Updated history: genetic testing completed for family history of ovarian and breast cancer  She has no cancer mutations found    Healthy Habits:  Do you get at least three servings of calcium containing foods daily (dairy, green leafy vegetables, etc.)? yes  Outside of work or daily activities, how many days per week do you exercise for 30 minutes or longer? Regular exercise  Takes multiple vitamin, glucosamine  Have you had an eye exam in the past two years? yes  Do you see a dentist twice per year? yes    PHQ-2  Over the last two weeks- Have you been bothered by little interest or pleasure in doing things?  No  Over the last two weeks- Have you been feeling down, depressed, or hopeless?  No         Advanced directive: counseled- and encouraged    Social History     Tobacco Use     Smoking status: Former Smoker     Smokeless tobacco: Never Used     Tobacco comment: quit 25-30 ago   Substance Use Topics     Alcohol use: Yes     Alcohol/week: 0.0 oz     Comment: social      The patient does not drink >3 drinks per day nor >7 drinks per week.    Reviewed orders with patient.  Reviewed health maintenance and updated orders accordingly - Yes      History of abnormal Pap smear: NO - age 30- 65 PAP every 3 years recommended  NO - age 30-65 PAP every 5 years with negative HPV co-testing recommended  All Histories reviewed and updated in [facility].      ROS:  CONSTITUTIONAL: NEGATIVE for fever, chills, change in weight  INTEGUMENTARY/SKIN: NEGATIVE for worrisome rashes, moles or lesions  EYES: NEGATIVE for vision changes or irritation  ENT: NEGATIVE for ear,  "mouth and throat problems  RESP: NEGATIVE for significant cough or SOB  BREAST: NEGATIVE for masses, tenderness or discharge  CV: NEGATIVE for chest pain, palpitations or peripheral edema  GI: NEGATIVE for nausea, abdominal pain, heartburn, or change in bowel habits  : NEGATIVE for unusual urinary or vaginal symptoms. No vaginal bleeding.  MUSCULOSKELETAL: NEGATIVE for significant arthralgias or myalgia  NEURO: NEGATIVE for weakness, dizziness or paresthesias  Right posterior neck pain, ? Slept wrong  Pain with looking over right shoulder- declines PT at this time  PSYCHIATRIC: NEGATIVE for changes in mood or affect          OBJECTIVE:  /78 (BP Location: Right arm, Patient Position: Sitting, Cuff Size: Adult Regular)   Pulse 78   Resp 20   Ht 1.727 m (5' 8\")   Wt 79.4 kg (175 lb)   LMP 06/20/2010   SpO2 99%   BMI 26.61 kg/m    General appearance: Healthy    Skin: Normal. No atypical appearing moles on inspection of trunk and extremities. Pigmentation on hands    External ears  and canals clear bilaterally. TM's normal bilaterally. Nose normal without lesions or discharge. Oropharynx normal. Neck supple without palpable adenopathy.    Breasts are symmetric.  No dominant, discrete, fixed  or suspicious masses are noted.  No skin or nipple changes or axillary nodes. Self exam is taught and encouraged.    Regular rate and  rhythm. S1 and S2 normal, no murmurs, clicks, gallops or rubs. No edema or JVD. Chest is clear; no wheezes or rales.    The abdomen is soft without tenderness, guarding, mass or organomegaly. Bowel sounds are normal. No CVA tenderness or inguinal adenopathy noted.    Pelvic:  deferred    Rectal exam:deferred  Extremities: negative.      COUNSELING:  Reviewed preventive health counseling, as reflected in patient instructions  Special attention given to:        Regular exercise       Healthy diet/nutrition       Osteoporosis Prevention/Bone Health       Advance Care Planning    ATP III " Guidelines  ICSI Preventive Guidelines    ASSESSMENT/PLAN:  (Z00.00) Encounter for physical examination  (primary encounter diagnosis)  Comment:   Plan: Basic Metabolic Panel (BFP), HEMOGRAM/PLATELET         (BFP), VENOUS COLLECTION            (Z23) Need for vaccination  Comment:   Plan: TD (ADULT, 7+) PRESERVE FREE            (E03.9) Acquired hypothyroidism  Comment: therapeutic range- refill current dose for one year  Plan: levothyroxine (SYNTHROID/LEVOTHROID) 50 MCG         tablet, TSH ([facility]), T4 free, VENOUS COLLECTION            (R39.15) Urinary urgency  Comment:   Plan: oxybutynin (DITROPAN) 5 MG tablet            (L23.7) Contact dermatitis due to poison ivy  Comment: pt get filled yearly for frequent exposure  Plan: clobetasol (TEMOVATE) 0.05 % external cream

## 2019-07-04 LAB
T4, FREE, NON-DIALYSIS - QUEST: 1.1 NG/DL (ref 0.8–1.8)
TSH SERPL-ACNC: 2.89 MIU/L (ref 0.4–4.5)

## 2019-07-05 RX ORDER — CLOBETASOL PROPIONATE 0.5 MG/G
CREAM TOPICAL
Qty: 60 G | Refills: 0 | Status: SHIPPED | OUTPATIENT
Start: 2019-07-05

## 2019-07-05 RX ORDER — LEVOTHYROXINE SODIUM 50 UG/1
50 TABLET ORAL DAILY
Qty: 90 TABLET | Refills: 3 | Status: SHIPPED | OUTPATIENT
Start: 2019-07-05 | End: 2020-06-29

## 2019-07-05 RX ORDER — OXYBUTYNIN CHLORIDE 5 MG/1
5 TABLET ORAL 2 TIMES DAILY
Qty: 180 TABLET | Refills: 3 | Status: SHIPPED | OUTPATIENT
Start: 2019-07-05 | End: 2020-06-29

## 2019-07-12 DIAGNOSIS — E03.9 ACQUIRED HYPOTHYROIDISM: ICD-10-CM

## 2019-07-12 DIAGNOSIS — R39.15 URINARY URGENCY: ICD-10-CM

## 2019-07-12 RX ORDER — LEVOTHYROXINE SODIUM 50 UG/1
TABLET ORAL
Qty: 90 TABLET | Refills: 0 | OUTPATIENT
Start: 2019-07-12

## 2019-07-12 RX ORDER — OXYBUTYNIN CHLORIDE 5 MG/1
TABLET ORAL
Qty: 180 TABLET | Refills: 0 | OUTPATIENT
Start: 2019-07-12

## 2019-07-12 NOTE — TELEPHONE ENCOUNTER
Refused Prescriptions:                       Disp   Refills    oxybutynin (DITROPAN) 5 MG tablet [Pharmac*180 ta*0        Sig: TAKE 1 TABLET(5 MG) BY MOUTH TWICE DAILY  Refused By: JOVITA IRVING  Reason for Refusal: Request already responded to by other means (phone, fax, etc.)    levothyroxine (SYNTHROID/LEVOTHROID) 50 MC*90 tab*0        Sig: TAKE 1 TABLET(50 MCG) BY MOUTH DAILY  Refused By: JOVITA IRVING  Reason for Refusal: Request already responded to by other means (phone, fax, etc.)    Refilled 7-5-2019 for one year  Jovita  [phone] (home) [phone] (work)

## 2019-10-18 ENCOUNTER — ALLIED HEALTH/NURSE VISIT (OUTPATIENT)
Dept: FAMILY MEDICINE | Facility: CLINIC | Age: 59
End: 2019-10-18

## 2019-10-18 DIAGNOSIS — Z23 NEED FOR VACCINATION: Primary | ICD-10-CM

## 2019-10-18 PROCEDURE — 90471 IMMUNIZATION ADMIN: CPT | Performed by: FAMILY MEDICINE

## 2019-10-18 PROCEDURE — 90472 IMMUNIZATION ADMIN EACH ADD: CPT | Performed by: FAMILY MEDICINE

## 2019-10-18 PROCEDURE — 90686 IIV4 VACC NO PRSV 0.5 ML IM: CPT | Performed by: FAMILY MEDICINE

## 2019-10-18 PROCEDURE — 90750 HZV VACC RECOMBINANT IM: CPT | Performed by: FAMILY MEDICINE

## 2019-12-16 ENCOUNTER — HOSPITAL ENCOUNTER (OUTPATIENT)
Dept: MAMMOGRAPHY | Facility: CLINIC | Age: 59
Discharge: HOME | End: 2019-12-16
Attending: FAMILY MEDICINE | Admitting: FAMILY MEDICINE
Payer: COMMERCIAL

## 2019-12-16 DIAGNOSIS — Z12.31 VISIT FOR SCREENING MAMMOGRAM: ICD-10-CM

## 2019-12-16 PROCEDURE — 77063 BREAST TOMOSYNTHESIS BI: CPT

## 2020-01-31 ENCOUNTER — ALLIED HEALTH/NURSE VISIT (OUTPATIENT)
Dept: FAMILY MEDICINE | Facility: CLINIC | Age: 60
End: 2020-01-31

## 2020-01-31 DIAGNOSIS — Z23 NEED FOR VACCINATION: Primary | ICD-10-CM

## 2020-01-31 PROCEDURE — 90471 IMMUNIZATION ADMIN: CPT | Performed by: FAMILY MEDICINE

## 2020-01-31 PROCEDURE — 90750 HZV VACC RECOMBINANT IM: CPT | Performed by: FAMILY MEDICINE

## 2020-06-29 ENCOUNTER — OFFICE VISIT (OUTPATIENT)
Dept: FAMILY MEDICINE | Facility: CLINIC | Age: 60
End: 2020-06-29

## 2020-06-29 VITALS
SYSTOLIC BLOOD PRESSURE: 124 MMHG | OXYGEN SATURATION: 99 % | WEIGHT: 178 LBS | DIASTOLIC BLOOD PRESSURE: 82 MMHG | TEMPERATURE: 98 F | HEART RATE: 76 BPM | HEIGHT: 68 IN | BODY MASS INDEX: 26.98 KG/M2

## 2020-06-29 DIAGNOSIS — Z13.228 SCREENING FOR METABOLIC DISORDER: ICD-10-CM

## 2020-06-29 DIAGNOSIS — Z12.4 SCREENING FOR CERVICAL CANCER: ICD-10-CM

## 2020-06-29 DIAGNOSIS — E03.9 ACQUIRED HYPOTHYROIDISM: ICD-10-CM

## 2020-06-29 DIAGNOSIS — Z13.220 SCREENING FOR LIPID DISORDERS: ICD-10-CM

## 2020-06-29 DIAGNOSIS — Z01.419 ENCOUNTER FOR GYNECOLOGICAL EXAMINATION WITHOUT ABNORMAL FINDING: Primary | ICD-10-CM

## 2020-06-29 DIAGNOSIS — R39.15 URINARY URGENCY: ICD-10-CM

## 2020-06-29 LAB
ALBUMIN SERPL-MCNC: 4.4 G/DL (ref 3.6–5.1)
ALBUMIN/GLOB SERPL: 1.9 {RATIO} (ref 1–2.5)
ALP SERPL-CCNC: 39 U/L (ref 33–130)
ALT 1742-6: 4 U/L (ref 0–32)
AST 1920-8: 12 U/L (ref 0–35)
BILIRUB SERPL-MCNC: 0.5 MG/DL (ref 0.2–1.2)
BUN SERPL-MCNC: 22 MG/DL (ref 7–25)
BUN/CREATININE RATIO: 21.4 (ref 6–22)
CALCIUM SERPL-MCNC: 9.4 MG/DL (ref 8.6–10.3)
CHLORIDE SERPLBLD-SCNC: 106.9 MMOL/L (ref 98–110)
CHOLEST SERPL-MCNC: 173 MG/DL (ref 0–199)
CHOLEST/HDLC SERPL: 3 {RATIO} (ref 0–5)
CO2 SERPL-SCNC: 24.9 MMOL/L (ref 20–32)
CREAT SERPL-MCNC: 1.03 MG/DL (ref 0.7–1.18)
GLOBULIN, CALCULATED - QUEST: 2.3 (ref 1.9–3.7)
GLUCOSE SERPL-MCNC: 100 MG/DL (ref 60–99)
HDLC SERPL-MCNC: 59 MG/DL (ref 40–150)
LDLC SERPL CALC-MCNC: 104 MG/DL (ref 0–130)
POTASSIUM SERPL-SCNC: 3.84 MMOL/L (ref 3.5–5.3)
PROT SERPL-MCNC: 6.7 G/DL (ref 6.1–8.1)
SODIUM SERPL-SCNC: 143 MMOL/L (ref 135–146)
TRIGL SERPL-MCNC: 49 MG/DL (ref 0–149)

## 2020-06-29 PROCEDURE — 80061 LIPID PANEL: CPT | Performed by: PHYSICIAN ASSISTANT

## 2020-06-29 PROCEDURE — 80053 COMPREHEN METABOLIC PANEL: CPT | Performed by: PHYSICIAN ASSISTANT

## 2020-06-29 PROCEDURE — 36415 COLL VENOUS BLD VENIPUNCTURE: CPT | Performed by: PHYSICIAN ASSISTANT

## 2020-06-29 PROCEDURE — 99396 PREV VISIT EST AGE 40-64: CPT | Performed by: PHYSICIAN ASSISTANT

## 2020-06-29 RX ORDER — LEVOTHYROXINE SODIUM 50 UG/1
50 TABLET ORAL DAILY
Qty: 90 TABLET | Refills: 3 | Status: SHIPPED | OUTPATIENT
Start: 2020-06-29 | End: 2021-06-01

## 2020-06-29 RX ORDER — OXYBUTYNIN CHLORIDE 5 MG/1
5 TABLET ORAL 2 TIMES DAILY
Qty: 180 TABLET | Refills: 3 | Status: SHIPPED | OUTPATIENT
Start: 2020-06-29 | End: 2021-07-02

## 2020-06-29 ASSESSMENT — MIFFLIN-ST. JEOR: SCORE: 1416.93
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
## 2020-06-29 NOTE — NURSING NOTE
Chief Complaint   Patient presents with     Physical     annual, fasting, pap due     Pre-visit Screening:  Immunizations:  up to date  Colonoscopy:  is up to date  Mammogram: is up to date  Asthma Action Test/Plan:  SHANTA  PHQ9:  PHQ-2 done today   GAD7:  NA  Questioned patient about current smoking habits Pt. quit smoking some time ago.  Ok to leave detailed message on voice mail for today's visit only Yes, phone #  [phone]

## 2020-06-30 LAB — TSH SERPL-ACNC: 2.72 MIU/L (ref 0.4–4.5)

## 2020-07-01 LAB
CLINICAL HISTORY - QUEST: NORMAL
COMMENT - QUEST: NORMAL
CYTOTECHNOLOGIST - QUEST: NORMAL
DESCRIPTIVE DIAGNOSIS - QUEST: NORMAL
LAST PAP DX - QUEST: NORMAL
LMP - QUEST: NORMAL
PREV BX DX - QUEST: NORMAL
SOURCE: NORMAL
STATEMENT OF ADEQUACY - QUEST: NORMAL

## 2020-10-02 ENCOUNTER — ALLIED HEALTH/NURSE VISIT (OUTPATIENT)
Dept: FAMILY MEDICINE | Facility: CLINIC | Age: 60
End: 2020-10-02

## 2020-10-02 DIAGNOSIS — Z23 NEED FOR VACCINATION: Primary | ICD-10-CM

## 2020-10-02 PROCEDURE — 90686 IIV4 VACC NO PRSV 0.5 ML IM: CPT | Performed by: PHYSICIAN ASSISTANT

## 2020-10-02 PROCEDURE — 90471 IMMUNIZATION ADMIN: CPT | Performed by: PHYSICIAN ASSISTANT

## 2021-01-08 ENCOUNTER — HOSPITAL ENCOUNTER (OUTPATIENT)
Dept: MAMMOGRAPHY | Facility: CLINIC | Age: 61
Discharge: HOME | End: 2021-01-08
Attending: FAMILY MEDICINE | Admitting: PHYSICIAN ASSISTANT
Payer: COMMERCIAL

## 2021-01-08 DIAGNOSIS — Z12.31 VISIT FOR SCREENING MAMMOGRAM: ICD-10-CM

## 2021-01-08 PROCEDURE — 77063 BREAST TOMOSYNTHESIS BI: CPT

## 2021-03-10 ENCOUNTER — OFFICE VISIT (OUTPATIENT)
Dept: FAMILY MEDICINE | Facility: CLINIC | Age: 61
End: 2021-03-10

## 2021-03-10 VITALS
HEART RATE: 83 BPM | HEIGHT: 68 IN | SYSTOLIC BLOOD PRESSURE: 140 MMHG | DIASTOLIC BLOOD PRESSURE: 96 MMHG | WEIGHT: 169.6 LBS | RESPIRATION RATE: 20 BRPM | BODY MASS INDEX: 25.7 KG/M2 | OXYGEN SATURATION: 99 % | TEMPERATURE: 97.9 F

## 2021-03-10 DIAGNOSIS — M89.9 DISORDER OF HYOID BONE: Primary | ICD-10-CM

## 2021-03-10 DIAGNOSIS — R49.9 CHANGE OF VOICE: ICD-10-CM

## 2021-03-10 PROCEDURE — 99214 OFFICE O/P EST MOD 30 MIN: CPT | Performed by: FAMILY MEDICINE

## 2021-03-10 SDOH — ECONOMIC STABILITY: INCOME INSECURITY: HOW HARD IS IT FOR YOU TO PAY FOR THE VERY BASICS LIKE FOOD, HOUSING, MEDICAL CARE, AND HEATING?: NOT HARD AT ALL

## 2021-03-10 SDOH — ECONOMIC STABILITY: TRANSPORTATION INSECURITY
IN THE PAST 12 MONTHS, HAS LACK OF TRANSPORTATION KEPT YOU FROM MEETINGS, WORK, OR FROM GETTING THINGS NEEDED FOR DAILY LIVING?: NO

## 2021-03-10 SDOH — ECONOMIC STABILITY: FOOD INSECURITY: WITHIN THE PAST 12 MONTHS, THE FOOD YOU BOUGHT JUST DIDN'T LAST AND YOU DIDN'T HAVE MONEY TO GET MORE.: NEVER TRUE

## 2021-03-10 SDOH — ECONOMIC STABILITY: TRANSPORTATION INSECURITY
IN THE PAST 12 MONTHS, HAS THE LACK OF TRANSPORTATION KEPT YOU FROM MEDICAL APPOINTMENTS OR FROM GETTING MEDICATIONS?: NO

## 2021-03-10 SDOH — ECONOMIC STABILITY: FOOD INSECURITY: WITHIN THE PAST 12 MONTHS, YOU WORRIED THAT YOUR FOOD WOULD RUN OUT BEFORE YOU GOT MONEY TO BUY MORE.: NEVER TRUE

## 2021-03-10 ASSESSMENT — MIFFLIN-ST. JEOR: SCORE: 1373.83

## 2021-03-10 NOTE — NURSING NOTE
Patricia is here today for a lump on the left side of her neck.    Pre-visit Screening:  Immunizations:  up to date  Colonoscopy:  is up to date  Mammogram: is up to date  Asthma Action Test/Plan:  NA  PHQ9:  NA  GAD7:  NA  Questioned patient about current smoking habits Pt. quit smoking some time ago.  Ok to leave detailed message on voice mail for today's visit only Yes, phone # [phone]

## 2021-03-11 NOTE — PATIENT INSTRUCTIONS
9) Disorder of hyoid bone  (primary encounter diagnosis)  Comment: anatomy reviewed  Plan: OTOLARYNGOLOGY REFERRAL        Consult with ENT for evaluation of vocal cords and this cartilage change. Call if any problems with a visit in the next 7-14 days    (R49.9) Change of voice  Plan: OTOLARYNGOLOGY REFERRAL        Salt water rinses/ voice rest.

## 2021-05-21 ENCOUNTER — PRE VISIT (OUTPATIENT)
Dept: UROLOGY | Facility: CLINIC | Age: 61
End: 2021-05-21

## 2021-05-22 NOTE — TELEPHONE ENCOUNTER
Reason for visit: urinary urgency     Relevant information: history of hypotonic bladder    Records/imaging/labs/orders: records available    Pt called: no need for a call

## 2021-05-24 ENCOUNTER — TELEPHONE (OUTPATIENT)
Dept: UROLOGY | Facility: CLINIC | Age: 61
End: 2021-05-24

## 2021-05-24 NOTE — TELEPHONE ENCOUNTER
[facility] Call Center    Phone Message    May a detailed message be left on voicemail: yes     Reason for Call: Other: PT Patricia called re: 5/27 video visit w/Dr. Schafer. Per PT she wishes to be seen by a provider in [place]. Per PT cancelled 5/27 appt and referring to clinic for reassignment and rescheduling. Pls call PT to discuss.     Action Taken: Other: UB URO    Travel Screening: Not Applicable

## 2021-05-27 ENCOUNTER — PRE VISIT (OUTPATIENT)
Dept: UROLOGY | Facility: CLINIC | Age: 61
End: 2021-05-27

## 2021-06-01 DIAGNOSIS — E03.9 ACQUIRED HYPOTHYROIDISM: ICD-10-CM

## 2021-06-01 RX ORDER — LEVOTHYROXINE SODIUM 50 UG/1
50 TABLET ORAL DAILY
Qty: 30 TABLET | Refills: 0 | COMMUNITY
Start: 2021-06-01 | End: 2021-07-02

## 2021-06-01 NOTE — TELEPHONE ENCOUNTER
Patient called in and left a message stating that she has an appointment on 7/2/21. I called this into the pharmacy and left a message on her VM to inform her that this was done.

## 2021-06-16 ENCOUNTER — VIRTUAL VISIT (OUTPATIENT)
Dept: UROLOGY | Facility: CLINIC | Age: 61
End: 2021-06-16
Payer: COMMERCIAL

## 2021-06-16 VITALS — WEIGHT: 158 LBS | HEIGHT: 68 IN | BODY MASS INDEX: 23.95 KG/M2

## 2021-06-16 DIAGNOSIS — R39.15 URINARY URGENCY: Primary | ICD-10-CM

## 2021-06-16 DIAGNOSIS — N31.8 HYPERTONIC BLADDER: ICD-10-CM

## 2021-06-16 PROCEDURE — 99203 OFFICE O/P NEW LOW 30 MIN: CPT | Mod: GT | Performed by: PHYSICIAN ASSISTANT

## 2021-06-16 ASSESSMENT — MIFFLIN-ST. JEOR: SCORE: 1325.18

## 2021-06-16 ASSESSMENT — PAIN SCALES - GENERAL: PAINLEVEL: NO PAIN (0)

## 2021-06-16 NOTE — LETTER
2021       RE: Patricia Ramirez  [address] [facility] [address]     Dear Colleague,    Thank you for referring your patient, Patricia Ramirez, to the [facility] [place] at [facility]. Please see a copy of my visit note below.    *SEND LINK TO CELL PHONE*    OLD DR POST PT    Patricia is a 60 year old who is being evaluated via a billable video visit.      How would you like to obtain your AVS? Mail a copy  If the video visit is dropped, the invitation should be resent by: Text to cell phone: [phone]  Will anyone else be joining your video visit? No      Video-Visit Details    Type of service:  Video Visit    Video Start Time: 1445    Video End Time:1500    Originating Location (pt. Location): Home    Distant Location (provider location):  [facility] [place]     Platform used for Video Visit: Doximity     Subjective      REQUESTING PROVIDER   Dory Perez     REASON FOR CONSULT   Urinary urgency    HISTORY OF PRESENT ILLNESS   Ms. Ramirez is a very pleasant 60 year old, , female, who presents today via video visit to discuss urinary urgency and occasional urge incontinence.  This has been going on for several years.  She was previously seen by Dr. Del Toro.  She was taking oxybutynin 5 mg twice daily.  Symptoms had remained stable on this.  Her primary care provider has been refilling this.    Patient notes that she has been on this for some time and her symptoms are relatively well controlled.  She is doing some time voiding.  She denies any hematuria or dysuria.  On a good day, she denies significant urgency or frequency.  She does note some occasional slight urge incontinence at nighttime.  She typically will have nocturia x2.  She occasionally feels like she does not empty her bladder completely.  Typically she will have good voided volumes.  She does endorse some more urgency  before bedtime.  No difficulties with bowel movements or back issues.    The following portions of the patient's history were reviewed and updated as appropriate: allergies, current medications, past family history, past medical history, past social history, past surgical history and problem list.     REVIEW OF SYSTEMS   Review of Systems   Constitutional: Negative for chills and fever.   HENT:        Dry mouth.   Respiratory: Negative for shortness of breath.    Cardiovascular: Negative for chest pain.   Gastrointestinal: Negative for constipation, diarrhea, nausea and vomiting.   Genitourinary: Positive for urgency. Negative for dysuria and hematuria.      Per HPI.     Patient Active Problem List   Diagnosis     Acquired hypothyroidism     Contact dermatitis and other eczema due to plants (except food)     Health Care Home     Hypertonic bladder/ Dr Del Toro     Hepatitis C antibody positive in blood      Past Medical History:   Diagnosis Date     Complication of anesthesia      Mumps      MV COLLISION NOS- 9/19/06/ cervicalgia 10/5/2006     Spider veins       Past Surgical History:   Procedure Laterality Date     CYSTOSCOPY       HC COLONOSCOPY THRU STOMA, DIAGNOSTIC  6/2009    Normal      Social History:   .  Former smoker.     Objective      PHYSICAL EXAM   GENERAL: Healthy, alert and no distress  EYES: Eyes grossly normal to inspection.  No discharge or erythema, or obvious scleral/conjunctival abnormalities.  HENT: Normal cephalic/atraumatic.  External ears, nose and mouth without ulcers or lesions.  No nasal drainage visible.  NECK: No asymmetry, visible masses or scars  RESP: No audible wheeze, cough, or visible cyanosis.  No visible retractions or increased work of breathing.    MS: No gross musculoskeletal defects noted.  Normal range of motion.  No visible edema.  SKIN: Visible skin clear. No significant rash, abnormal pigmentation or lesions.  NEURO: Cranial nerves grossly intact.  Mentation  and speech appropriate for age.  PSYCH: Mentation appears normal, affect normal/bright, judgement and insight intact, normal speech and appearance well-groomed.     LABORATORY   Lab Results   Component Value Date    CR 1.03 06/29/2020      Assessment & Plan    1. Urinary urgency    2. Hypertonic bladder/ Dr Del Toro       I had the pleasure today of meeting with Ms. Ramirez to discuss her urinary urgency.  She has tolerated Myrbetriq 5 mg twice daily for some time.  Her symptoms are relatively well controlled other than at nighttime.    -Discussed restricting fluids 4 hours prior to bedtime.  Also discussed intentional double voiding.    She is likely more aware of her bladder symptoms in the overnight hours due to decreased stimuli.    -We will plan on renewal of oxybutynin 5 mg twice daily 90 tablets with 3 refills.    -Follow-up in 1 year with urinalysis and postvoid residual with office visit.    -Can consider trial of Myrbetriq in the interim if difficulties with oxybutynin.  Common side effect with this is increasing blood pressure.  We discussed that often the cost is what is prohibitive with this.  Contact if would like to trial.      Signed by:     Abi Ambriz PA-C

## 2021-06-16 NOTE — PROGRESS NOTES
*SEND LINK TO CELL PHONE*    OLD DR POST PT    Patricia is a 60 year old who is being evaluated via a billable video visit.      How would you like to obtain your AVS? Mail a copy  If the video visit is dropped, the invitation should be resent by: Text to cell phone: [phone]  Will anyone else be joining your video visit? No      Video-Visit Details    Type of service:  Video Visit    Video Start Time: 1445    Video End Time:1500    Originating Location (pt. Location): Home    Distant Location (provider location):  [facility] [place]     Platform used for Video Visit: Doximity     Subjective      REQUESTING PROVIDER   Dory Perez     REASON FOR CONSULT   Urinary urgency    HISTORY OF PRESENT ILLNESS   Ms. Ramirez is a very pleasant 60 year old, , female, who presents today via video visit to discuss urinary urgency and occasional urge incontinence.  This has been going on for several years.  She was previously seen by Dr. Del Toro.  She was taking oxybutynin 5 mg twice daily.  Symptoms had remained stable on this.  Her primary care provider has been refilling this.    Patient notes that she has been on this for some time and her symptoms are relatively well controlled.  She is doing some time voiding.  She denies any hematuria or dysuria.  On a good day, she denies significant urgency or frequency.  She does note some occasional slight urge incontinence at nighttime.  She typically will have nocturia x2.  She occasionally feels like she does not empty her bladder completely.  Typically she will have good voided volumes.  She does endorse some more urgency before bedtime.  No difficulties with bowel movements or back issues.    The following portions of the patient's history were reviewed and updated as appropriate: allergies, current medications, past family history, past medical history, past social history, past surgical history and problem list.     REVIEW OF SYSTEMS    Review of Systems   Constitutional: Negative for chills and fever.   HENT:        Dry mouth.   Respiratory: Negative for shortness of breath.    Cardiovascular: Negative for chest pain.   Gastrointestinal: Negative for constipation, diarrhea, nausea and vomiting.   Genitourinary: Positive for urgency. Negative for dysuria and hematuria.      Per HPI.     Patient Active Problem List   Diagnosis     Acquired hypothyroidism     Contact dermatitis and other eczema due to plants (except food)     Health Care Home     Rainer bladder/ Dr Del Toro     Hepatitis C antibody positive in blood      Past Medical History:   Diagnosis Date     Complication of anesthesia      Mumps      MV COLLISION NOS- 9/19/06/ cervicalgia 10/5/2006     Spider veins       Past Surgical History:   Procedure Laterality Date     CYSTOSCOPY       HC COLONOSCOPY THRU STOMA, DIAGNOSTIC  6/2009    Normal      Social History:   .  Former smoker.     Objective      PHYSICAL EXAM   GENERAL: Healthy, alert and no distress  EYES: Eyes grossly normal to inspection.  No discharge or erythema, or obvious scleral/conjunctival abnormalities.  HENT: Normal cephalic/atraumatic.  External ears, nose and mouth without ulcers or lesions.  No nasal drainage visible.  NECK: No asymmetry, visible masses or scars  RESP: No audible wheeze, cough, or visible cyanosis.  No visible retractions or increased work of breathing.    MS: No gross musculoskeletal defects noted.  Normal range of motion.  No visible edema.  SKIN: Visible skin clear. No significant rash, abnormal pigmentation or lesions.  NEURO: Cranial nerves grossly intact.  Mentation and speech appropriate for age.  PSYCH: Mentation appears normal, affect normal/bright, judgement and insight intact, normal speech and appearance well-groomed.     LABORATORY   Lab Results   Component Value Date    CR 1.03 06/29/2020      Assessment & Plan    1. Urinary urgency    2. Hypertonic bladder/ Dr Del Toro        I had the pleasure today of meeting with Ms. Ramirez to discuss her urinary urgency.  She has tolerated Myrbetriq 5 mg twice daily for some time.  Her symptoms are relatively well controlled other than at nighttime.    -Discussed restricting fluids 4 hours prior to bedtime.  Also discussed intentional double voiding.    She is likely more aware of her bladder symptoms in the overnight hours due to decreased stimuli.    -We will plan on renewal of oxybutynin 5 mg twice daily 90 tablets with 3 refills.    -Follow-up in 1 year with urinalysis and postvoid residual with office visit.    -Can consider trial of Myrbetriq in the interim if difficulties with oxybutynin.  Common side effect with this is increasing blood pressure.  We discussed that often the cost is what is prohibitive with this.  Contact if would like to trial.      Signed by:     Abi Ambriz PA-C

## 2021-06-28 ENCOUNTER — NURSE TRIAGE (OUTPATIENT)
Dept: NURSING | Facility: CLINIC | Age: 61
End: 2021-06-28

## 2021-06-28 NOTE — TELEPHONE ENCOUNTER
[facility] pharmacy calling about Oxybutynin prescription. Medication doesn't appear to be prescribed by [facility] [place] provider. There is unfinished documentation from urology but unable to see if a script was sent for her. Katarzyna will contact patient.     Charito Deutsch RN BSN 6/28/2021 1:36 PM

## 2021-06-28 NOTE — TELEPHONE ENCOUNTER
Denied refill request for oxybutynin. Pt established care with Abi Ambriz at [facility]. Called pt and she will contact urology.  Pt has an appt with Dr. Rivas this Friday for other meds.

## 2021-06-30 ASSESSMENT — ENCOUNTER SYMPTOMS
HEMATURIA: 0
FEVER: 0
SHORTNESS OF BREATH: 0
DYSURIA: 0
DIARRHEA: 0
CONSTIPATION: 0
NAUSEA: 0
VOMITING: 0
CHILLS: 0

## 2021-06-30 NOTE — PATIENT INSTRUCTIONS
-Discussed restricting fluids 4 hours prior to bedtime.  Also discussed intentional double voiding.    -We will plan on renewal of oxybutynin 5 mg twice daily 90 tablets with 3 refills.    -Follow-up in 1 year with urinalysis and postvoid residual with office visit.    -Can consider trial of Myrbetriq in the interim if difficulties with oxybutynin.  Common side effect with this is increasing blood pressure.  We discussed that often the cost is what is prohibitive with this.  Contact if would like to trial.

## 2021-07-02 ENCOUNTER — OFFICE VISIT (OUTPATIENT)
Dept: FAMILY MEDICINE | Facility: CLINIC | Age: 61
End: 2021-07-02

## 2021-07-02 VITALS
BODY MASS INDEX: 24.86 KG/M2 | OXYGEN SATURATION: 97 % | TEMPERATURE: 97.8 F | WEIGHT: 158.4 LBS | SYSTOLIC BLOOD PRESSURE: 120 MMHG | HEART RATE: 83 BPM | DIASTOLIC BLOOD PRESSURE: 66 MMHG | HEIGHT: 67 IN

## 2021-07-02 DIAGNOSIS — R39.15 URINARY URGENCY: ICD-10-CM

## 2021-07-02 DIAGNOSIS — N31.8 HYPERTONIC BLADDER: ICD-10-CM

## 2021-07-02 DIAGNOSIS — Z00.00 ROUTINE GENERAL MEDICAL EXAMINATION AT A HEALTH CARE FACILITY: Primary | ICD-10-CM

## 2021-07-02 DIAGNOSIS — E03.9 ACQUIRED HYPOTHYROIDISM: ICD-10-CM

## 2021-07-02 LAB
BUN SERPL-MCNC: 18 MG/DL (ref 7–25)
BUN/CREATININE RATIO: 18.6 (ref 6–22)
CALCIUM SERPL-MCNC: 10.2 MG/DL (ref 8.6–10.3)
CHLORIDE SERPLBLD-SCNC: 105.6 MMOL/L (ref 98–110)
CHOLEST SERPL-MCNC: 181 MG/DL (ref 0–199)
CHOLEST/HDLC SERPL: 3 {RATIO} (ref 0–5)
CO2 SERPL-SCNC: 28 MMOL/L (ref 20–32)
CREAT SERPL-MCNC: 0.97 MG/DL (ref 0.6–1.3)
GLUCOSE SERPL-MCNC: 86 MG/DL (ref 60–99)
HDLC SERPL-MCNC: 60 MG/DL (ref 40–150)
LDLC SERPL CALC-MCNC: 108 MG/DL (ref 0–130)
POTASSIUM SERPL-SCNC: 4.36 MMOL/L (ref 3.5–5.3)
SODIUM SERPL-SCNC: 141.5 MMOL/L (ref 135–146)
TRIGL SERPL-MCNC: 64 MG/DL (ref 0–149)

## 2021-07-02 PROCEDURE — 80048 BASIC METABOLIC PNL TOTAL CA: CPT | Performed by: FAMILY MEDICINE

## 2021-07-02 PROCEDURE — 80061 LIPID PANEL: CPT | Performed by: FAMILY MEDICINE

## 2021-07-02 PROCEDURE — 36415 COLL VENOUS BLD VENIPUNCTURE: CPT | Performed by: FAMILY MEDICINE

## 2021-07-02 PROCEDURE — 99396 PREV VISIT EST AGE 40-64: CPT | Performed by: FAMILY MEDICINE

## 2021-07-02 RX ORDER — OXYBUTYNIN CHLORIDE 5 MG/1
5 TABLET ORAL 2 TIMES DAILY
Qty: 180 TABLET | Refills: 3 | Status: SHIPPED | OUTPATIENT
Start: 2021-07-02 | End: 2023-07-05 | Stop reason: ALTCHOICE

## 2021-07-02 RX ORDER — LEVOTHYROXINE SODIUM 50 UG/1
50 TABLET ORAL DAILY
Qty: 90 TABLET | Refills: 3 | Status: SHIPPED | OUTPATIENT
Start: 2021-07-02 | End: 2022-07-11

## 2021-07-02 ASSESSMENT — MIFFLIN-ST. JEOR: SCORE: 1311.13

## 2021-07-02 NOTE — PATIENT INSTRUCTIONS
Preventive Health Recommendations  Female Ages 50 - 64    Yearly exam: See your health care provider every year in order to  o Review health changes.   o Discuss preventive care.    o Review your medicines if your doctor has prescribed any.      Get a Pap test every three years (unless you have an abnormal result and your provider advises testing more often).    If you get Pap tests with HPV test, you only need to test every 5 years, unless you have an abnormal result.     You do not need a Pap test if your uterus was removed (hysterectomy) and you have not had cancer.    You should be tested each year for STDs (sexually transmitted diseases) if you're at risk.     Have a mammogram every 1 to 2 years.    Have a colonoscopy at age 50, or have a yearly FIT test (stool test). These exams screen for colon cancer.      Have a cholesterol test every 5 years, or more often if advised.    Have a diabetes test (fasting glucose) every three years. If you are at risk for diabetes, you should have this test more often.     If you are at risk for osteoporosis (brittle bone disease), think about having a bone density scan (DEXA).    Shots: Get a flu shot each year. Get a tetanus shot every 10 years.    Nutrition:     Eat at least 5 servings of fruits and vegetables each day.    Eat whole-grain bread, whole-wheat pasta and brown rice instead of white grains and rice.    Get adequate Calcium and Vitamin D.     Lifestyle    Exercise at least 150 minutes a week (30 minutes a day, 5 days a week). This will help you control your weight and prevent disease.    Limit alcohol to one drink per day.    No smoking.     Wear sunscreen to prevent skin cancer.     See your dentist every six months for an exam and cleaning.    See your eye doctor every 1 to 2 years.  ASSESSMENT/PLAN:   1. Routine general medical examination at a health care facility    - Basic Metabolic Panel (BFP)  - Lipid Panel (BFP)    2. Acquired hypothyroidism  Check  "labs, refilled medications.  - VENOUS COLLECTION  - TSH WITH FREE T4 REFLEX ([facility])  - levothyroxine (SYNTHROID/LEVOTHROID) 50 MCG tablet; Take 1 tablet (50 mcg) by mouth daily  Dispense: 90 tablet; Refill: 3    3. Hypertonic bladder/ Dr Del Toro  She will get in touch with the urologist recently saw her and due now for refills.  Patient has been advised of split billing requirements and indicates understanding: Yes  COUNSELING:   Reviewed preventive health counseling, as reflected in patient instructions       Regular exercise       Healthy diet/nutrition    Estimated body mass index is 24.81 kg/m  as calculated from the following:    Height as of this encounter: 1.702 m (5' 7\").    Weight as of this encounter: 71.8 kg (158 lb 6.4 oz).        She reports that she has quit smoking. She has never used smokeless tobacco.          Donna Rivas MD  [facility]    "

## 2021-07-02 NOTE — NURSING NOTE
Patricia is here for a fasting CPX.    Pre-Visit Screening:  Immunizations:UTD  Colonoscopy:UTD  Mammogram:UTD  Asthma Action Test/Plan:NA  PHQ9:Na  GAD7:Na  Questioned patient about current smoking habits Pt.former  OK to leave a detailed message on voice mail for today's visit yes, phone # [phone]

## 2021-07-02 NOTE — PROGRESS NOTES
SUBJECTIVE:   CC: Patricia Ramirez is an 60 year old woman who presents for preventive health visit.     Here for a physical.  Has thyroid medications, is due for labs and medication refills.   Has seen urology for her oxybutinin. Was told to go back to see urology--hasn't gone again .     Patient has been advised of split billing requirements and indicates understanding: Yes  Healthy Habits:    Do you get at least three servings of calcium containing foods daily (dairy, green leafy vegetables, etc.)? yes    Amount of exercise or daily activities, outside of work: different things, bikes, yard work, swims.    Problems taking medications regularly No    Medication side effects: No    Have you had an eye exam in the past two years? yes    Do you see a dentist twice per year? yes    Do you have sleep apnea, excessive snoring or daytime drowsiness?no    Today's PHQ-2 Score:   PHQ-2 ( 1999 Pf[employer]) 6/16/2021 3/10/2021   Q1: Little interest or pleasure in doing things 0 0   Q2: Feeling down, depressed or hopeless 0 0   PHQ-2 Score 0 0     Do you feel safe in your environment? Yes        Social History     Tobacco Use     Smoking status: Former Smoker     Smokeless tobacco: Never Used     Tobacco comment: quit 25-30 ago   Substance Use Topics     Alcohol use: Yes     Alcohol/week: 0.0 standard drinks     Frequency: 2-4 times a month     Drinks per session: 1 or 2     Binge frequency: Never     Comment: social      If you drink alcohol do you typically have >3 drinks per day or >7 drinks per week? No                     Reviewed orders with patient.  Reviewed health maintenance and updated orders accordingly - Yes  BP Readings from Last 3 Encounters:   07/02/21 120/66   03/10/21 (!) 140/96   06/29/20 124/82    Wt Readings from Last 3 Encounters:   07/02/21 71.8 kg (158 lb 6.4 oz)   06/16/21 71.7 kg (158 lb)   03/10/21 76.9 kg (169 lb 9.6 oz)                  Patient Active Problem List   Diagnosis     Acquired  hypothyroidism     Contact dermatitis and other eczema due to plants (except food)     Health Care Home     Hypertonic bladder/ Dr Del Toro     Hepatitis C antibody positive in blood     Past Surgical History:   Procedure Laterality Date     CYSTOSCOPY       HC COLONOSCOPY THRU STOMA, DIAGNOSTIC  6/2009    Normal       Social History     Tobacco Use     Smoking status: Former Smoker     Smokeless tobacco: Never Used     Tobacco comment: quit 25-30 ago   Substance Use Topics     Alcohol use: Yes     Alcohol/week: 0.0 standard drinks     Frequency: 2-4 times a month     Drinks per session: 1 or 2     Binge frequency: Never     Comment: social      Family History   Problem Relation Age of Onset     Hypertension Mother      Cerebrovascular Disease Mother      Heart Disease Mother      Cancer Maternal Grandmother         lung & breast     Cancer Maternal Aunt         lung     Diabetes No family hx of          Current Outpatient Medications   Medication Sig Dispense Refill     GLUCOSAMINE HCL--250 MG OR TABS 1 TABLET DAILY       levothyroxine (SYNTHROID/LEVOTHROID) 50 MCG tablet Take 1 tablet (50 mcg) by mouth daily 90 tablet 3     MULTIPLE VITAMINS/WOMENS OR TABS 1 TABLET DAILY       oxybutynin (DITROPAN) 5 MG tablet Take 1 tablet (5 mg) by mouth 2 times daily 180 tablet 3     clobetasol (TEMOVATE) 0.05 % external cream Apply  topically 2 times daily. (should not be used on face or in groin) (Patient not taking: Reported on 7/2/2021) 60 g 0         Pertinent mammograms are reviewed under the imaging tab.    Pertinent mammograms are reviewed under the imaging tab.  History of abnormal Pap smear: up to date always normal     Reviewed and updated as needed this visit by clinical staff  Tobacco  Allergies  Meds  Problems  Med Hx  Surg Hx  Fam Hx          Reviewed and updated as needed this visit by Provider                Past Medical History:   Diagnosis Date     Complication of anesthesia      Mumps      MV  "COLLISION NOS- 9/19/06/ cervicalgia 10/5/2006     Spider veins       Past Surgical History:   Procedure Laterality Date     CYSTOSCOPY       HC COLONOSCOPY THRU STOMA, DIAGNOSTIC  6/2009    Normal       ROS:  CONSTITUTIONAL: NEGATIVE for fever, chills, change in weight  INTEGUMENTARU/SKIN: NEGATIVE for worrisome rashes, moles or lesions  EYES: NEGATIVE for vision changes or irritation  ENT: NEGATIVE for ear, mouth and throat problems  RESP: NEGATIVE for significant cough or SOB  BREAST: NEGATIVE for masses, tenderness or discharge  CV: NEGATIVE for chest pain, palpitations or peripheral edema  GI: NEGATIVE for nausea, abdominal pain, heartburn, or change in bowel habits  : NEGATIVE for unusual urinary or vaginal symptoms. Periods are regular.  MUSCULOSKELETAL: NEGATIVE for significant arthralgias or myalgia  NEURO: NEGATIVE for weakness, dizziness or paresthesias  PSYCHIATRIC: NEGATIVE for changes in mood or affect      OBJECTIVE:   /66 (BP Location: Right arm, Patient Position: Sitting, Cuff Size: Adult Large)   Pulse 83   Temp 97.8  F (36.6  C) (Oral)   Ht 1.702 m (5' 7\")   Wt 71.8 kg (158 lb 6.4 oz)   LMP 06/20/2010   SpO2 97%   BMI 24.81 kg/m    EXAM:  GENERAL: healthy, alert and no distress  EYES: Eyes grossly normal to inspection, PERRL and conjunctivae and sclerae normal  HENT: ear canals and TM's normal, nose and mouth without ulcers or lesions  NECK: no adenopathy, no asymmetry, masses, or scars and thyroid normal to palpation  RESP: lungs clear to auscultation - no rales, rhonchi or wheezes  BREAST: normal without masses, tenderness or nipple discharge and no palpable axillary masses or adenopathy  CV: regular rate and rhythm, normal S1 S2, no S3 or S4, no murmur, click or rub, no peripheral edema and peripheral pulses strong  ABDOMEN: soft, nontender, no hepatosplenomegaly, no masses and bowel sounds normal  MS: no gross musculoskeletal defects noted, no edema  SKIN: no suspicious " "lesions or rashes  NEURO: Normal strength and tone, mentation intact and speech normal  PSYCH: mentation appears normal, affect normal/bright  --deferred by patient, declined    Diagnostic Test Results:  Labs reviewed in Epic  No results found for this or any previous visit (from the past 24 hour(s)).    ASSESSMENT/PLAN:   1. Routine general medical examination at a health care facility    - Basic Metabolic Panel (BFP)  - Lipid Panel (BFP)    2. Acquired hypothyroidism  Check labs, refilled medications.  - VENOUS COLLECTION  - TSH WITH FREE T4 REFLEX ([facility])  - levothyroxine (SYNTHROID/LEVOTHROID) 50 MCG tablet; Take 1 tablet (50 mcg) by mouth daily  Dispense: 90 tablet; Refill: 3    3. Hypertonic bladder/ Dr Del Toro  She will get in touch with the urologist recently saw her and due now for refills.  Patient has been advised of split billing requirements and indicates understanding: Yes  COUNSELING:   Reviewed preventive health counseling, as reflected in patient instructions       Regular exercise       Healthy diet/nutrition    Estimated body mass index is 24.81 kg/m  as calculated from the following:    Height as of this encounter: 1.702 m (5' 7\").    Weight as of this encounter: 71.8 kg (158 lb 6.4 oz).        She reports that she has quit smoking. She has never used smokeless tobacco.          Donna Rivas MD  [facility]  "

## 2021-07-02 NOTE — TELEPHONE ENCOUNTER
Pt stated they need this filled and that you were going to fill this. Please advise and inform pt.     Pending Prescriptions:                       Disp   Refills    oxybutynin (DITROPAN) 5 MG tablet         180 ta*3            Sig: Take 1 tablet (5 mg) by mouth 2 times daily

## 2021-07-02 NOTE — LETTER
July 6, 2021      Patricia Ramirez  [address] [facility] [address]        Dear ,    We are writing to inform you of your test results.    Hello, your labs were all ok: lipids, kidney function    Resulted Orders   TSH WITH FREE T4 REFLEX ([facility])   Result Value Ref Range    TSH 2.39 0.40 - 4.50 mIU/L    Narrative    FASTING: UNKNOWN   Basic Metabolic Panel (BFP)   Result Value Ref Range    Carbon Dioxide 28.0 20 - 32 mmol/L    Creatinine 0.97 0.60 - 1.30 mg/dL    Glucose 86 60 - 99 mg/dL    Sodium 141.5 135 - 146 mmol/L    Potassium 4.36 3.5 - 5.3 mmol/L    Chloride 105.6 98 - 110 mmol/L    Urea Nitrogen 18 7 - 25 mg/dL    Calcium 10.2 8.6 - 10.3 mg/dL    BUN/Creatinine Ratio 18.6 6 - 22   Lipid Panel (BFP)   Result Value Ref Range    Cholesterol 181 0 - 199 mg/dL    Triglycerides 64 0 - 149 mg/dL    HDL Cholesterol 60 40 - 150 mg/dL    LDL Cholesterol Direct 108 0 - 130 mg/dL    Cholesterol/HDL Ratio 3 0 - 5       If you have any questions or concerns, please call the clinic at the number listed above.       Sincerely,      Donna Rivas MD

## 2021-07-03 LAB — TSH SERPL-ACNC: 2.39 MIU/L (ref 0.4–4.5)

## 2021-10-01 ENCOUNTER — OFFICE VISIT (OUTPATIENT)
Dept: FAMILY MEDICINE | Facility: CLINIC | Age: 61
End: 2021-10-01

## 2021-10-01 VITALS
HEART RATE: 69 BPM | TEMPERATURE: 97.6 F | DIASTOLIC BLOOD PRESSURE: 72 MMHG | WEIGHT: 152.6 LBS | HEIGHT: 67 IN | BODY MASS INDEX: 23.95 KG/M2 | OXYGEN SATURATION: 97 % | SYSTOLIC BLOOD PRESSURE: 118 MMHG

## 2021-10-01 DIAGNOSIS — H26.8 OTHER CATARACT OF BOTH EYES: ICD-10-CM

## 2021-10-01 DIAGNOSIS — Z01.818 PREOPERATIVE EXAMINATION: Primary | ICD-10-CM

## 2021-10-01 DIAGNOSIS — R76.8 HEPATITIS B ANTIBODY POSITIVE IN BLOOD: ICD-10-CM

## 2021-10-01 DIAGNOSIS — E03.9 ACQUIRED HYPOTHYROIDISM: ICD-10-CM

## 2021-10-01 DIAGNOSIS — Z23 ENCOUNTER FOR VACCINATION: ICD-10-CM

## 2021-10-01 DIAGNOSIS — N31.8 HYPERTONIC BLADDER: ICD-10-CM

## 2021-10-01 LAB
% GRANULOCYTES: 60.9 %
BUN SERPL-MCNC: 16 MG/DL (ref 7–25)
BUN/CREATININE RATIO: 17.8 (ref 6–22)
CALCIUM SERPL-MCNC: 9.9 MG/DL (ref 8.6–10.3)
CHLORIDE SERPLBLD-SCNC: 105.7 MMOL/L (ref 98–110)
CO2 SERPL-SCNC: 27.2 MMOL/L (ref 20–32)
CREAT SERPL-MCNC: 0.9 MG/DL (ref 0.6–1.3)
GLUCOSE SERPL-MCNC: 91 MG/DL (ref 60–99)
HCT VFR BLD AUTO: 41.7 % (ref 35–47)
HEMOGLOBIN: 13.7 G/DL (ref 11.7–15.7)
LYMPHOCYTES NFR BLD AUTO: 31.4 %
MCH RBC QN AUTO: 32.8 PG (ref 26–33)
MCHC RBC AUTO-ENTMCNC: 32.9 G/DL (ref 31–36)
MCV RBC AUTO: 99.7 FL (ref 78–100)
MONOCYTES NFR BLD AUTO: 7.7 %
PLATELET COUNT - QUEST: 247 10^9/L (ref 150–375)
POTASSIUM SERPL-SCNC: 4.18 MMOL/L (ref 3.5–5.3)
RBC # BLD AUTO: 4.18 10*12/L (ref 3.8–5.2)
SODIUM SERPL-SCNC: 142.4 MMOL/L (ref 135–146)
WBC # BLD AUTO: 8.7 10*9/L (ref 4–11)

## 2021-10-01 PROCEDURE — 99215 OFFICE O/P EST HI 40 MIN: CPT | Mod: 25 | Performed by: FAMILY MEDICINE

## 2021-10-01 PROCEDURE — 90686 IIV4 VACC NO PRSV 0.5 ML IM: CPT | Performed by: FAMILY MEDICINE

## 2021-10-01 PROCEDURE — 90471 IMMUNIZATION ADMIN: CPT | Performed by: FAMILY MEDICINE

## 2021-10-01 PROCEDURE — 36415 COLL VENOUS BLD VENIPUNCTURE: CPT | Performed by: FAMILY MEDICINE

## 2021-10-01 PROCEDURE — 85025 COMPLETE CBC W/AUTO DIFF WBC: CPT | Performed by: FAMILY MEDICINE

## 2021-10-01 PROCEDURE — 80048 BASIC METABOLIC PNL TOTAL CA: CPT | Performed by: FAMILY MEDICINE

## 2021-10-01 ASSESSMENT — MIFFLIN-ST. JEOR: SCORE: 1284.82

## 2021-10-01 NOTE — PROGRESS NOTES
[facility]  [address] [place] [address]  [address]  [facility] [address]  Phone: [phone]  Fax: [phone]  Primary Provider: Rajwinder Rivas  Pre-op Performing Provider: RAJWINDER RIVAS      PREOPERATIVE EVALUATION:  Today's date: 10/1/2021    Patricia Ramirez is a 60 year old female who presents for a preoperative evaluation. ( [birth year])    Surgical Information:  Surgery/Procedure: Cataracts  Surgery Location: [facility]  Surgeon: Dr. Ennis  Surgery Date: 10/07 right eye, 10/21 left eye  Time of Surgery: TBD  Where patient plans to recover: At home with family  Fax number for surgical facility: [phone] ATTN: Annalisa Webb    Type of Anesthesia Anticipated: to be determined    Assessment & Plan     The proposed surgical procedure is considered LOW risk.    Problem List Items Addressed This Visit        Endocrine    Acquired hypothyroidism       Urinary    Hypertonic bladder/ Dr Del Toro       Other    Hepatitis B antibody positive in blood    Relevant Orders    HEPATITIS B CORE ANTIBODY ([facility]) (Completed)    Hepatitis B Surface Qual Nayla ([facility]) (Completed)    Adult Gastro Ref - Consult Only      Other Visit Diagnoses     Preoperative examination    -  Primary    Relevant Orders    VENOUS COLLECTION (Completed)    HEMOGRAM PLATELET DIFF (BFP) (Completed)    Basic Metabolic Panel (BFP) (Completed)    Other cataract of both eyes        Encounter for vaccination        Relevant Orders    FLU VAC PRESRV FREE QUAD SPLIT VIR 3+YRS IM (Completed)        1. Preoperative examination      - VENOUS COLLECTION  - HEMOGRAM PLATELET DIFF (BFP)  - Basic Metabolic Panel (BFP)    2. Other cataract of both eyes        3. Acquired hypothyroidism  On medications.    4. Hypertonic bladder/ Dr Del Toro  Controlled on medications.    5. Hepatitis B antibody positive in blood  Unclear history, I will check labs.  - HEPATITIS B CORE ANTIBODY ([facility])  - Hepatitis B Surface Qual Nayla  ([facility])    6. Encounter for vaccination    - FLU VAC PRESRV FREE QUAD SPLIT VIR 3+YRS IM      Risks and Recommendations:  The patient has the following additional risks and recommendations for perioperative complications:   - No identified additional risk factors other than previously addressed    Medication Instructions:  Patient is to take all scheduled medications on the day of surgery    RECOMMENDATION:  APPROVAL GIVEN to proceed with proposed procedure, without further diagnostic evaluation.                      Subjective     HPI related to upcoming procedure: bilateral cataracts, here for preop. Dx 1 1/2 years ago and gradually worsening. Went in and told the eye doctor that her vision is worse.    Also noted in her chart is history hepatitis c ab in blood. I asked patient about this and she said no, it's hep b. I was able to look back through the chart. I see negative hepatitis c ab in 2016. Noted was + hep b ab from donating blood. I see no labs through us for this.    1. No - Have you ever had a heart attack or stroke?  2. No - Have you ever had surgery on your heart or blood vessels, such as a stent, coronary (heart) bypass, or surgery on an artery in the head, neck, heart, or legs?  3. No - Do you have chest pain when you are physically active?  4. No - Do you have a history of heart failure?  5. No - Do you currently have a cold, bronchitis, or symptoms of other respiratory (head and chest) infections?  6. No - Do you have a cough, shortness of breath, or wheezing?  7. No - Do you or anyone in your family have a history of blood clots?  8. No - Do you or anyone in your family have a serious bleeding problem, such as long-lasting bleeding after surgeries or cuts?  9. Yes - Have you ever had anemia or been told to take iron pills? Anemia when pregnant  10. No - Have you had any abnormal blood loss such as black, tarry or bloody stools, or abnormal vaginal bleeding?  11. No - Have you ever had a blood  transfusion?  12. Yes - Are you willing to have a blood transfusion if it is medically needed before, during, or after your surgery?  13. Yes - Have you or anyone in your family ever had problems with anesthesia (sedation for surgery)? Self--difficulty coming out of anesthesia, and then very nauseated and sick. Difficult to arouse. Father also with this problem.  14. No - Do you have sleep apnea, excessive snoring, or daytime drowsiness?   15. No - Do you have any artifical heart valves or other implanted medical devices, such as a pacemaker, defibrillator, or continuous glucose monitor?  16. No - Do you have any artifical joints?  17. No - Are you allergic to latex?  18. No - Is there any chance that you may be pregnant?    Health Care Directive:  Patient does not have a Health Care Directive or Living Will: Discussed advance care planning with patient; however, patient declined at this time.    Preoperative Review of :   reviewed - no record of controlled substances prescribed.      Status of Chronic Conditions:  HYPOTHYROIDISM - Patient has a longstanding history of chronic Hypothyroidism. Patient has been doing well, noting no tremor, insomnia, hair loss or changes in skin texture. Continues to take medications as directed, without adverse reactions or side effects. Last TSH   Lab Results   Component Value Date    TSH 2.39 07/02/2021   .        Review of Systems  Constitutional, neuro, ENT, endocrine, pulmonary, cardiac, gastrointestinal, genitourinary, musculoskeletal, integument and psychiatric systems are negative, except as otherwise noted.    Patient Active Problem List    Diagnosis Date Noted     Hepatitis B antibody positive in blood 05/29/2015     Priority: Medium     Hypertonic bladder/ Dr Del Toro 05/28/2014     Priority: Medium     Acquired hypothyroidism 05/29/2012     Priority: Medium     Problem list name updated by automated process. Provider to review       Contact dermatitis and other  "eczema due to plants (except food) 05/29/2012     Priority: Medium     Health Care Home 12/20/2012     Priority: Low     State Tier Level:  Tier 1  Status:  n/a  Care Coordinator:   See Letters for [facility] Care Plan              Past Medical History:   Diagnosis Date     Complication of anesthesia      Mumps      MV COLLISION NOS- 9/19/06/ cervicalgia 10/5/2006     Spider veins      Past Surgical History:   Procedure Laterality Date     CYSTOSCOPY       ZZHC COLONOSCOPY THRU STOMA, DIAGNOSTIC  6/2009    Normal     Current Outpatient Medications   Medication Sig Dispense Refill     clobetasol (TEMOVATE) 0.05 % external cream Apply  topically 2 times daily. (should not be used on face or in groin) 60 g 0     GLUCOSAMINE HCL--250 MG OR TABS 1 TABLET DAILY       levothyroxine (SYNTHROID/LEVOTHROID) 50 MCG tablet Take 1 tablet (50 mcg) by mouth daily 90 tablet 3     MULTIPLE VITAMINS/WOMENS OR TABS 1 TABLET DAILY       oxybutynin (DITROPAN) 5 MG tablet Take 1 tablet (5 mg) by mouth 2 times daily 180 tablet 3       Allergies   Allergen Reactions     No Known Allergies      Other [No Clinical Screening - See Comments] Nausea and Vomiting     Gets sick feeling off all narcotics        Social History     Tobacco Use     Smoking status: Former Smoker     Smokeless tobacco: Never Used     Tobacco comment: quit 25-30 ago   Substance Use Topics     Alcohol use: Yes     Alcohol/week: 0.0 standard drinks     Comment: social      Family History   Problem Relation Age of Onset     Hypertension Mother      Cerebrovascular Disease Mother      Heart Disease Mother      Cancer Maternal Grandmother         lung & breast     Cancer Maternal Aunt         lung     Diabetes No family hx of      History   Drug Use No         Objective     /72 (BP Location: Right arm, Patient Position: Sitting, Cuff Size: Adult Regular)   Pulse 69   Temp 97.6  F (36.4  C) (Temporal)   Ht 1.702 m (5' 7\")   Wt 69.2 kg (152 lb 9.6 oz)   LMP " 06/20/2010   SpO2 97%   BMI 23.90 kg/m      Physical Exam    GENERAL APPEARANCE: healthy, alert and no distress     EYES: EOMI, PERRL     HENT: ear canals and TM's normal and nose and mouth without ulcers or lesions     NECK: no adenopathy, no asymmetry, masses, or scars and thyroid normal to palpation     RESP: lungs clear to auscultation - no rales, rhonchi or wheezes     CV: regular rates and rhythm, normal S1 S2, no S3 or S4 and no murmur, click or rub     ABDOMEN:  soft, nontender, no HSM or masses and bowel sounds normal     MS: extremities normal- no gross deformities noted, no evidence of inflammation in joints, FROM in all extremities.     SKIN: no suspicious lesions or rashes     NEURO: Normal strength and tone, sensory exam grossly normal, mentation intact and speech normal     PSYCH: mentation appears normal. and affect normal/bright     LYMPHATICS: No cervical adenopathy    Recent Labs   Lab Test 07/02/21  1127 06/29/20  0000   .5 143.0   POTASSIUM 4.36 3.84   CR 0.97 1.03        Diagnostics:  Recent Results (from the past 168 hour(s))   Basic Metabolic Panel (BFP)    Collection Time: 10/01/21 12:00 AM   Result Value Ref Range    Carbon Dioxide 27.2 20 - 32 mmol/L    Creatinine 0.90 0.60 - 1.30 mg/dL    Glucose 91 60 - 99 mg/dL    Sodium 142.4 135 - 146 mmol/L    Potassium 4.18 3.5 - 5.3 mmol/L    Chloride 105.7 98 - 110 mmol/L    Urea Nitrogen 16 7 - 25 mg/dL    Calcium 9.9 8.6 - 10.3 mg/dL    BUN/Creatinine Ratio 17.8 6 - 22   HEMOGRAM PLATELET DIFF (BFP)    Collection Time: 10/01/21  9:49 AM   Result Value Ref Range    WBC 8.7 4.0 - 11 10*9/L    RBC Count 4.18 3.8 - 5.2 10*12/L    Hemoglobin 13.7 11.7 - 15.7 g/dL    Hematocrit 41.7 35.0 - 47.0 %    MCV 99.7 78 - 100 fL    MCH 32.8 26 - 33 pg    MCHC 32.9 31 - 36 g/dL    Platelet Count 247 150 - 375 10^9/L    % Granulocytes 60.9 %    % Lymphocytes 31.4 %    % Monocytes 7.7 %   HEPATITIS B CORE ANTIBODY ([facility])    Collection Time: 10/01/21  10:27 AM   Result Value Ref Range    HB Core Antibody REACTIVE (A) NON-REACTIVE   Hepatitis B Surface Qual Nayla ([facility])    Collection Time: 10/01/21 10:27 AM   Result Value Ref Range    HB S Antibody REACTIVE (A) NON-REACTIVE          Revised Cardiac Risk Index (RCRI):  The patient has the following serious cardiovascular risks for perioperative complications:   - No serious cardiac risks = 0 points     RCRI Interpretation: 0 points: Class I (very low risk - 0.4% complication rate)           Signed Electronically by: Donna Rivas MD  Copy of this evaluation report is provided to requesting physician.

## 2021-10-01 NOTE — LETTER
October 4, 2021      Patricia Ramirez  [address] [facility] [address]        Dear ,    We are writing to inform you of your test results.    Here are your results.  Please schedule with [facility] as we discussed.  [facility]  [address] [facility]  [address]  [facility] [place] [facility] [address]  [phone] - appt line  [phone] - fax     Resulted Orders   HEMOGRAM PLATELET DIFF (BFP)   Result Value Ref Range    WBC 8.7 4.0 - 11 10*9/L    RBC Count 4.18 3.8 - 5.2 10*12/L    Hemoglobin 13.7 11.7 - 15.7 g/dL    Hematocrit 41.7 35.0 - 47.0 %    MCV 99.7 78 - 100 fL    MCH 32.8 26 - 33 pg    MCHC 32.9 31 - 36 g/dL    Platelet Count 247 150 - 375 10^9/L    % Granulocytes 60.9 %    % Lymphocytes 31.4 %    % Monocytes 7.7 %   Basic Metabolic Panel (BFP)   Result Value Ref Range    Carbon Dioxide 27.2 20 - 32 mmol/L    Creatinine 0.90 0.60 - 1.30 mg/dL    Glucose 91 60 - 99 mg/dL    Sodium 142.4 135 - 146 mmol/L    Potassium 4.18 3.5 - 5.3 mmol/L    Chloride 105.7 98 - 110 mmol/L    Urea Nitrogen 16 7 - 25 mg/dL    Calcium 9.9 8.6 - 10.3 mg/dL    BUN/Creatinine Ratio 17.8 6 - 22   HEPATITIS B CORE ANTIBODY ([facility])   Result Value Ref Range    HB Core Antibody REACTIVE (A) NON-REACTIVE   Hepatitis B Surface Qual Nayla ([facility])   Result Value Ref Range    HB S Antibody REACTIVE (A) NON-REACTIVE       If you have any questions or concerns, please call the clinic at the number listed above.       Sincerely,      Donna Rivas MD

## 2021-10-02 LAB
HB CORE AB - QUEST: REACTIVE
HB S AB - QUEST: REACTIVE

## 2021-12-22 ENCOUNTER — TRANSFERRED RECORDS (OUTPATIENT)
Dept: FAMILY MEDICINE | Facility: CLINIC | Age: 61
End: 2021-12-22

## 2021-12-27 ENCOUNTER — TRANSFERRED RECORDS (OUTPATIENT)
Dept: FAMILY MEDICINE | Facility: CLINIC | Age: 61
End: 2021-12-27

## 2022-01-21 ENCOUNTER — HOSPITAL ENCOUNTER (OUTPATIENT)
Dept: MAMMOGRAPHY | Facility: CLINIC | Age: 62
Discharge: HOME | End: 2022-01-21
Attending: FAMILY MEDICINE | Admitting: FAMILY MEDICINE
Payer: COMMERCIAL

## 2022-01-21 DIAGNOSIS — Z12.31 VISIT FOR SCREENING MAMMOGRAM: ICD-10-CM

## 2022-01-21 PROCEDURE — 77067 SCR MAMMO BI INCL CAD: CPT

## 2022-06-21 ENCOUNTER — OFFICE VISIT (OUTPATIENT)
Dept: UROLOGY | Facility: CLINIC | Age: 62
End: 2022-06-21
Payer: COMMERCIAL

## 2022-06-21 VITALS
HEIGHT: 68 IN | WEIGHT: 155 LBS | BODY MASS INDEX: 23.49 KG/M2 | DIASTOLIC BLOOD PRESSURE: 64 MMHG | SYSTOLIC BLOOD PRESSURE: 102 MMHG

## 2022-06-21 DIAGNOSIS — N39.44 NOCTURNAL ENURESIS: ICD-10-CM

## 2022-06-21 DIAGNOSIS — R39.15 URINARY URGENCY: Primary | ICD-10-CM

## 2022-06-21 LAB
ALBUMIN UR-MCNC: NEGATIVE MG/DL
APPEARANCE UR: CLEAR
BILIRUB UR QL STRIP: NEGATIVE
COLOR UR AUTO: YELLOW
GLUCOSE UR STRIP-MCNC: NEGATIVE MG/DL
HGB UR QL STRIP: ABNORMAL
KETONES UR STRIP-MCNC: NEGATIVE MG/DL
LEUKOCYTE ESTERASE UR QL STRIP: ABNORMAL
MUCOUS THREADS #/AREA URNS LPF: PRESENT /LPF
NITRATE UR QL: NEGATIVE
PH UR STRIP: 5.5 [PH] (ref 5–7)
RBC URINE: 2 /HPF
SP GR UR STRIP: >=1.03 (ref 1–1.03)
SQUAMOUS EPITHELIAL: 3 /HPF
UROBILINOGEN UR STRIP-ACNC: 0.2 E.U./DL
WBC URINE: 4 /HPF

## 2022-06-21 PROCEDURE — 51798 US URINE CAPACITY MEASURE: CPT | Performed by: PHYSICIAN ASSISTANT

## 2022-06-21 PROCEDURE — 81001 URINALYSIS AUTO W/SCOPE: CPT | Performed by: PHYSICIAN ASSISTANT

## 2022-06-21 PROCEDURE — 99213 OFFICE O/P EST LOW 20 MIN: CPT | Mod: 25 | Performed by: PHYSICIAN ASSISTANT

## 2022-06-21 RX ORDER — TROSPIUM CHLORIDE 20 MG/1
20 TABLET, FILM COATED ORAL
Qty: 180 TABLET | Refills: 3 | Status: SHIPPED | OUTPATIENT
Start: 2022-06-21 | End: 2023-07-05

## 2022-06-21 ASSESSMENT — ENCOUNTER SYMPTOMS
NAUSEA: 0
FEVER: 0
HEMATURIA: 0
CHILLS: 0
SHORTNESS OF BREATH: 0
VOMITING: 0
DYSURIA: 0

## 2022-06-21 ASSESSMENT — PAIN SCALES - GENERAL: PAINLEVEL: NO PAIN (0)

## 2022-06-21 NOTE — PATIENT INSTRUCTIONS
Will trial a different anticholinergic to see if this will lesion the side effects.    Stop oxybutynin 5 mg twice daily.  Start tropsium 20 mg twice daily.      You have been prescribed medication to help relax your bladder.    This medication may help control (or improve) urinary frequency, urgency and urge incontinence.    Common side effects include:  Dry mouth (Biotene mouthwash can help with this.)  Constipation  Drowsiness/Mental Fogginess  Blurred vision/Dry Eyes  Difficulty with sweating    Rare side effect:    If you have less benefit from the trospium and/or no improvement in side effects, would recommend contacting me to send a new prescription for oxybutynin 5 mg twice daily.    Can consider Myrbetriq, pelvic floor PT, or posterior tibial nerve stimulation.  Urinary retention     Will send urine for microscopy.  If positive for blood, will need to schedule CT Urogram and cystoscopy.

## 2022-06-21 NOTE — LETTER
6/21/2022       RE: Patricia Ramirez  [address]  [facility] [address]     Dear Colleague,    Thank you for referring your patient, Patricia Ramirez, to the [facility] [place] [facility]. Please see a copy of my visit note below.    Subjective      CHIEF COMPLAINT/REASON FOR VISIT   Follow up on urinary urgency     HISTORY OF PRESENT ILLNESS   Ms. Ramirez is a very pleasant 61-year-old female, who presents today for follow-up regarding urinary urgency.  She last saw myself on 06/16/2021.  She previously been seen by Dr. Del Toro who noted that she had a hypotonic bladder.  She has been on oxybutynin 5 mg twice daily for some time.  She is having decent results with this, but does endorse difficulties with dry mouth even with using Biotene.    She denies any UTI symptomatology at this time.  She has nocturia x0.  Denies any gross hematuria or dysuria.  She does not have a pacemaker.    She is wondering about some other options.    The following portions of the patient's history were reviewed and updated as appropriate: allergies, current medications, past family history, past medical history, past social history, past surgical history, and problem list.     REVIEW OF SYSTEMS   Review of Systems   Constitutional: Negative for chills and fever.   Respiratory: Negative for shortness of breath.    Cardiovascular: Negative for chest pain.   Gastrointestinal: Negative for nausea and vomiting.   Genitourinary: Positive for urgency. Negative for dysuria and hematuria.      Per HPI.     Patient Active Problem List   Diagnosis     Acquired hypothyroidism     Contact dermatitis and other eczema due to plants (except food)     Health Care Home     Hypertonic bladder/ Dr Del Toro     Hepatitis B antibody positive in blood      Past Medical History:   Diagnosis Date     Complication of anesthesia      Mumps      MV COLLISION NOS- 9/19/06/  "cervicalgia 10/5/2006     Spider veins       Objective      PHYSICAL EXAM   /64   Ht 1.727 m (5' 8\")   Wt 70.3 kg (155 lb)   LMP 06/20/2010   BMI 23.57 kg/m     Physical Exam  Constitutional:       Appearance: Normal appearance.   HENT:      Head: Normocephalic.      Nose: Nose normal.   Eyes:      General: No scleral icterus.  Pulmonary:      Effort: Pulmonary effort is normal.   Musculoskeletal:      Cervical back: Normal range of motion.   Neurological:      General: No focal deficit present.      Mental Status: She is alert and oriented to person, place, and time.   Psychiatric:         Mood and Affect: Mood normal.         Behavior: Behavior normal.         LABORATORY     Recent Labs   Lab Test 06/21/22  1443   COLOR Yellow   APPEARANCE Clear   URINEGLC Negative   URINEBILI Negative   URINEKETONE Negative   SG >=1.030   UBLD Trace*   URINEPH 5.5   PROTEIN Negative   UROBILINOGEN 0.2   NITRITE Negative   LEUKEST Small*     TESTING    PVR: 93 ml    Assessment & Plan    1. Urinary urgency    2. Nocturnal enuresis        I had the pleasure today of meeting with Ms. Ramirez to discuss her urinary urgency.  She has been on oxybutynin 5 mg twice daily for many years.  She is having side effects with this.  We discussed possible trial of Myrbetriq.  We also discussed trial of a different anticholinergic.    It does not sound like she may have coverage for Myrbetriq.  At this time we will plan on the following:    Will trial a different anticholinergic to see if this will lesion the side effects.    Stop oxybutynin 5 mg twice daily.  Start tropsium 20 mg twice daily.      If you have less benefit from the trospium and/or no improvement in side effects, would recommend contacting me to send a new prescription for oxybutynin 5 mg twice daily.    Can consider Myrbetriq, pelvic floor PT, or posterior tibial nerve stimulation.    Will send urine for microscopy.  If positive for blood, will need to schedule CT " Urogram and cystoscopy.    If continuing on medication, will need a med check in 1 year.    Signed by:       Abi Ambriz PA-C 6/21/2022 3:40 PM

## 2022-06-21 NOTE — PROGRESS NOTES
"Subjective      CHIEF COMPLAINT/REASON FOR VISIT   Follow up on urinary urgency     HISTORY OF PRESENT ILLNESS   Ms. Ramirez is a very pleasant 61-year-old female, who presents today for follow-up regarding urinary urgency.  She last saw myself on 06/16/2021.  She previously been seen by Dr. Del Toro who noted that she had a hypotonic bladder.  She has been on oxybutynin 5 mg twice daily for some time.  She is having decent results with this, but does endorse difficulties with dry mouth even with using Biotene.    She denies any UTI symptomatology at this time.  She has nocturia x0.  Denies any gross hematuria or dysuria.  She does not have a pacemaker.    She is wondering about some other options.    The following portions of the patient's history were reviewed and updated as appropriate: allergies, current medications, past family history, past medical history, past social history, past surgical history, and problem list.     REVIEW OF SYSTEMS   Review of Systems   Constitutional: Negative for chills and fever.   Respiratory: Negative for shortness of breath.    Cardiovascular: Negative for chest pain.   Gastrointestinal: Negative for nausea and vomiting.   Genitourinary: Positive for urgency. Negative for dysuria and hematuria.      Per HPI.     Patient Active Problem List   Diagnosis     Acquired hypothyroidism     Contact dermatitis and other eczema due to plants (except food)     Health Care Home     Hypertonic bladder/ Dr Del Toro     Hepatitis B antibody positive in blood      Past Medical History:   Diagnosis Date     Complication of anesthesia      Mumps      MV COLLISION NOS- 9/19/06/ cervicalgia 10/5/2006     Spider veins       Objective      PHYSICAL EXAM   /64   Ht 1.727 m (5' 8\")   Wt 70.3 kg (155 lb)   LMP 06/20/2010   BMI 23.57 kg/m     Physical Exam  Constitutional:       Appearance: Normal appearance.   HENT:      Head: Normocephalic.      Nose: Nose normal.   Eyes:      General: " No scleral icterus.  Pulmonary:      Effort: Pulmonary effort is normal.   Musculoskeletal:      Cervical back: Normal range of motion.   Neurological:      General: No focal deficit present.      Mental Status: She is alert and oriented to person, place, and time.   Psychiatric:         Mood and Affect: Mood normal.         Behavior: Behavior normal.         LABORATORY     Recent Labs   Lab Test 06/21/22  1443   COLOR Yellow   APPEARANCE Clear   URINEGLC Negative   URINEBILI Negative   URINEKETONE Negative   SG >=1.030   UBLD Trace*   URINEPH 5.5   PROTEIN Negative   UROBILINOGEN 0.2   NITRITE Negative   LEUKEST Small*     TESTING    PVR: 93 ml    Assessment & Plan    1. Urinary urgency    2. Nocturnal enuresis        I had the pleasure today of meeting with Ms. Ramirez to discuss her urinary urgency.  She has been on oxybutynin 5 mg twice daily for many years.  She is having side effects with this.  We discussed possible trial of Myrbetriq.  We also discussed trial of a different anticholinergic.    It does not sound like she may have coverage for Myrbetriq.  At this time we will plan on the following:    Will trial a different anticholinergic to see if this will lesion the side effects.    Stop oxybutynin 5 mg twice daily.  Start tropsium 20 mg twice daily.      If you have less benefit from the trospium and/or no improvement in side effects, would recommend contacting me to send a new prescription for oxybutynin 5 mg twice daily.    Can consider Myrbetriq, pelvic floor PT, or posterior tibial nerve stimulation.    Will send urine for microscopy.  If positive for blood, will need to schedule CT Urogram and cystoscopy.    If continuing on medication, will need a med check in 1 year.    Signed by:       Abi Ambriz PA-C 6/21/2022 3:40 PM

## 2022-07-09 DIAGNOSIS — E03.9 ACQUIRED HYPOTHYROIDISM: ICD-10-CM

## 2022-07-10 RX ORDER — LEVOTHYROXINE SODIUM 50 UG/1
TABLET ORAL
Qty: 60 TABLET | Refills: 0 | COMMUNITY
Start: 2022-07-10

## 2022-07-10 NOTE — TELEPHONE ENCOUNTER
Received incoming refill request for  Pending Prescriptions:                       Disp   Refills    levothyroxine (SYNTHROID/LEVOTHROID) 50 M*60 tab*0            Sig: TAKE ONE TABLET BY MOUTH EVERY DAY    Patient has appt tomorrow where full refills will be sent in.

## 2022-07-11 ENCOUNTER — OFFICE VISIT (OUTPATIENT)
Dept: FAMILY MEDICINE | Facility: CLINIC | Age: 62
End: 2022-07-11

## 2022-07-11 VITALS
SYSTOLIC BLOOD PRESSURE: 112 MMHG | OXYGEN SATURATION: 99 % | DIASTOLIC BLOOD PRESSURE: 74 MMHG | HEART RATE: 84 BPM | TEMPERATURE: 98 F | WEIGHT: 155 LBS | HEIGHT: 68 IN | BODY MASS INDEX: 23.49 KG/M2

## 2022-07-11 DIAGNOSIS — N31.8 HYPERTONIC BLADDER: ICD-10-CM

## 2022-07-11 DIAGNOSIS — E03.9 ACQUIRED HYPOTHYROIDISM: ICD-10-CM

## 2022-07-11 DIAGNOSIS — Z00.00 ENCOUNTER FOR ROUTINE HISTORY AND PHYSICAL EXAM IN FEMALE PATIENT: Primary | ICD-10-CM

## 2022-07-11 DIAGNOSIS — Z87.891 PERSONAL HISTORY OF TOBACCO USE, PRESENTING HAZARDS TO HEALTH: ICD-10-CM

## 2022-07-11 DIAGNOSIS — I71.20 THORACIC AORTIC ANEURYSM WITHOUT RUPTURE, UNSPECIFIED PART (H): ICD-10-CM

## 2022-07-11 LAB
BUN SERPL-MCNC: 17 MG/DL (ref 7–25)
BUN/CREATININE RATIO: 16.8 (ref 6–22)
CALCIUM SERPL-MCNC: 9.8 MG/DL (ref 8.6–10.3)
CHLORIDE SERPLBLD-SCNC: 104.9 MMOL/L (ref 98–110)
CHOLEST SERPL-MCNC: 169 MG/DL (ref 0–199)
CHOLEST/HDLC SERPL: 3 {RATIO} (ref 0–5)
CO2 SERPL-SCNC: 30.7 MMOL/L (ref 20–32)
CREAT SERPL-MCNC: 1.01 MG/DL (ref 0.6–1.3)
GLUCOSE SERPL-MCNC: 87 MG/DL (ref 60–99)
HDLC SERPL-MCNC: 65 MG/DL (ref 40–150)
LDLC SERPL CALC-MCNC: 93 MG/DL (ref 0–130)
POTASSIUM SERPL-SCNC: 4.53 MMOL/L (ref 3.5–5.3)
SODIUM SERPL-SCNC: 141.3 MMOL/L (ref 135–146)
TRIGL SERPL-MCNC: 57 MG/DL (ref 0–149)

## 2022-07-11 PROCEDURE — 80048 BASIC METABOLIC PNL TOTAL CA: CPT | Performed by: FAMILY MEDICINE

## 2022-07-11 PROCEDURE — 80061 LIPID PANEL: CPT | Performed by: FAMILY MEDICINE

## 2022-07-11 RX ORDER — LEVOTHYROXINE SODIUM 50 UG/1
50 TABLET ORAL DAILY
Qty: 90 TABLET | Refills: 3 | Status: SHIPPED | OUTPATIENT
Start: 2022-07-11 | End: 2023-07-14

## 2022-07-11 NOTE — LETTER
July 15, 2022      Patricia Ramirez  [address] [facility] [address]        Dear ,    We are writing to inform you of your test results.    Your labs were all good:  Lipids  Kidney function  Thyroid (tsh)    Resulted Orders   TSH WITH FREE T4 REFLEX ([facility])   Result Value Ref Range    TSH 3.89 0.40 - 4.50 mIU/L   Lipid Panel ([facility])   Result Value Ref Range    Cholesterol 169 0 - 199 mg/dL    Triglycerides 57 0 - 149 mg/dL    HDL Cholesterol 65 40 - 150 mg/dL    LDL Cholesterol Direct 93 0 - 130 mg/dL    Cholesterol/HDL Ratio 3 0 - 5   Basic Metabolic Panel ([facility])   Result Value Ref Range    Carbon Dioxide 30.7 20 - 32 mmol/L    Creatinine 1.01 0.60 - 1.30 mg/dL    Glucose 87 60 - 99 mg/dL    Sodium 141.3 135 - 146 mmol/L    Potassium 4.53 3.5 - 5.3 mmol/L    Chloride 104.9 98 - 110 mmol/L    Urea Nitrogen 17 7 - 25 mg/dL    Calcium 9.8 8.6 - 10.3 mg/dL    BUN/Creatinine Ratio 16.8 6 - 22       If you have any questions or concerns, please call the clinic at the number listed above.       Sincerely,      Donna Rivas MD

## 2022-07-11 NOTE — NURSING NOTE
Chief Complaint   Patient presents with     Physical     Fasting today     Recheck Medication     Refill levothyroxine     Pre-visit Screening:  Immunizations:  up to date  Colonoscopy:  Pt has one scheduled  Mammogram: is up to date  Asthma Action Test/Plan:  NA  PHQ9:  NA  GAD7:  NA  Questioned patient about current smoking habits Pt. quit smoking some time ago.  Ok to leave detailed message on voice mail for today's visit only Yes, phone # [phone]

## 2022-07-11 NOTE — PATIENT INSTRUCTIONS
"ASSESSMENT/PLAN:   1. Encounter for routine history and physical exam in female patient    - Lipid Panel ([facility])  - Basic Metabolic Panel ([facility])    2. Acquired hypothyroidism  Check labs, continue medications, refilled.  - levothyroxine (SYNTHROID/LEVOTHROID) 50 MCG tablet; Take 1 tablet (50 mcg) by mouth daily  Dispense: 90 tablet; Refill: 3  - TSH WITH FREE T4 REFLEX ([facility])    3. Hypertonic bladder/ Dr Del Toro      4. Personal history of tobacco use, presenting hazards to health    - CT Chest Lung Cancer Screen Low Dose Without; Future  - Radiology Referral; Future    Patient has been advised of split billing requirements and indicates understanding: Yes    COUNSELING:  Reviewed preventive health counseling, as reflected in patient instructions       Regular exercise       Healthy diet/nutrition    Estimated body mass index is 23.92 kg/m  as calculated from the following:    Height as of this encounter: 1.715 m (5' 7.5\").    Weight as of this encounter: 70.3 kg (155 lb).        She reports that she has quit smoking. She has never used smokeless tobacco.        Donna Rivas MD  [facility]  "

## 2022-07-11 NOTE — PROGRESS NOTES
SUBJECTIVE:   CC: Patricia Ramirez is an 61 year old woman who presents for preventive health visit.       Patient has been advised of split billing requirements and indicates understanding: Yes  HPI    Here for a physical. She  Is doing well. Due for labs and refills on her thyroid medications.     She is interested in lung cancer screen, has smoked off and on, started age 18, quit less than 15 years ago, can't remember exact details, discussed risks and benefits.      Today's PHQ-2 Score:   PHQ-2 ( 1999 [employer]) 6/21/2022   Q1: Little interest or pleasure in doing things 0   Q2: Feeling down, depressed or hopeless 0   PHQ-2 Score 0   PHQ-2 Total Score (12-17 Years)- Positive if 3 or more points; Administer PHQ-A if positive -         Do you feel safe in your environment? Yes        Social History     Tobacco Use     Smoking status: Former Smoker     Smokeless tobacco: Never Used     Tobacco comment: quit 25-30 ago   Substance Use Topics     Alcohol use: Yes     Alcohol/week: 0.0 standard drinks     Comment: social      No concerns    Alcohol Use 7/3/2019   Prescreen: >3 drinks/day or >7 drinks/week? The patient does not drink >3 drinks per day nor >7 drinks per week.       Reviewed orders with patient.  Reviewed health maintenance and updated orders accordingly - Yes  BP Readings from Last 3 Encounters:   07/11/22 112/74   06/21/22 102/64   10/01/21 118/72    Wt Readings from Last 3 Encounters:   07/11/22 70.3 kg (155 lb)   06/21/22 70.3 kg (155 lb)   10/01/21 69.2 kg (152 lb 9.6 oz)                  Patient Active Problem List   Diagnosis     Acquired hypothyroidism     Contact dermatitis and other eczema due to plants (except food)     Health Care Home     Hypertonic bladder/ Dr Del Toro     Hepatitis B antibody positive in blood     Past Surgical History:   Procedure Laterality Date     CYSTOSCOPY       ZZHC COLONOSCOPY THRU STOMA, DIAGNOSTIC  6/2009    Normal       Social History     Tobacco Use     Smoking  status: Former Smoker     Smokeless tobacco: Never Used     Tobacco comment: quit 25-30 ago   Substance Use Topics     Alcohol use: Yes     Alcohol/week: 0.0 standard drinks     Comment: social      Family History   Problem Relation Age of Onset     Hypertension Mother      Cerebrovascular Disease Mother      Heart Disease Mother      Cancer Maternal Grandmother         lung & breast     Cancer Maternal Aunt         lung     Diabetes No family hx of          Current Outpatient Medications   Medication Sig Dispense Refill     clobetasol (TEMOVATE) 0.05 % external cream Apply  topically 2 times daily. (should not be used on face or in groin) 60 g 0     GLUCOSAMINE HCL--250 MG OR TABS 1 TABLET DAILY       levothyroxine (SYNTHROID/LEVOTHROID) 50 MCG tablet Take 1 tablet (50 mcg) by mouth daily 90 tablet 3     MULTIPLE VITAMINS/WOMENS OR TABS 1 TABLET DAILY       oxybutynin (DITROPAN) 5 MG tablet Take 1 tablet (5 mg) by mouth 2 times daily 180 tablet 3     trospium (SANCTURA) 20 MG tablet Take 1 tablet (20 mg) by mouth 2 times daily (before meals) 180 tablet 3       Breast Cancer Screening:  Any new diagnosis of family breast, ovarian, or bowel cancer? No    FHS-7:   Breast CA Risk Assessment (FHS-7) 1/21/2022   Did any of your first-degree relatives have breast or ovarian cancer? Yes   Did any of your relatives have bilateral breast cancer? No   Did any man in your family have breast cancer? No   Did any woman in your family have breast and ovarian cancer? No   Did any woman in your family have breast cancer before age 50 y? No   Do you have 2 or more relatives with breast and/or ovarian cancer? Yes   Do you have 2 or more relatives with breast and/or bowel cancer? Yes       mammogram up to date, has seen genetic counselor  Pertinent mammograms are reviewed under the imaging tab.    History of abnormal Pap smear: history normal, up to date  No recent vaginal bleeding     Reviewed and updated as needed this visit  "by clinical staff   Tobacco  Allergies                 Reviewed and updated as needed this visit by Provider                   Past Medical History:   Diagnosis Date     Complication of anesthesia      Mumps      MV COLLISION NOS- 9/19/06/ cervicalgia 10/5/2006     Spider veins       Past Surgical History:   Procedure Laterality Date     CYSTOSCOPY       ZZHC COLONOSCOPY THRU STOMA, DIAGNOSTIC  6/2009    Normal       Review of Systems  CONSTITUTIONAL: NEGATIVE for fever, chills, change in weight  INTEGUMENTARY/SKIN: NEGATIVE for worrisome rashes, moles or lesions  EYES: NEGATIVE for vision changes or irritation  ENT: NEGATIVE for ear, mouth and throat problems  RESP: NEGATIVE for significant cough or SOB  BREAST: NEGATIVE for masses, tenderness or discharge  CV: NEGATIVE for chest pain, palpitations or peripheral edema  GI: NEGATIVE for nausea, abdominal pain, heartburn, or change in bowel habits  : NEGATIVE for unusual urinary or vaginal symptoms. No vaginal bleeding.  MUSCULOSKELETAL: NEGATIVE for significant arthralgias or myalgia  NEURO: NEGATIVE for weakness, dizziness or paresthesias  PSYCHIATRIC: NEGATIVE for changes in mood or affect      OBJECTIVE:   /74 (BP Location: Right arm, Patient Position: Sitting, Cuff Size: Adult Regular)   Pulse 84   Temp 98  F (36.7  C) (Temporal)   Ht 1.715 m (5' 7.5\")   Wt 70.3 kg (155 lb)   LMP 06/20/2010   SpO2 99%   BMI 23.92 kg/m    Physical Exam  GENERAL: healthy, alert and no distress  EYES: Eyes grossly normal to inspection, PERRL and conjunctivae and sclerae normal  HENT: ear canals and TM's normal, nose and mouth without ulcers or lesions  NECK: no adenopathy, no asymmetry, masses, or scars and thyroid normal to palpation  RESP: lungs clear to auscultation - no rales, rhonchi or wheezes  BREAST: normal without masses, tenderness or nipple discharge and no palpable axillary masses or adenopathy  CV: regular rate and rhythm, normal S1 S2, no S3 or " "S4, no murmur, click or rub, no peripheral edema and peripheral pulses strong  ABDOMEN: soft, nontender, no hepatosplenomegaly, no masses and bowel sounds normal   (female): normal female external genitalia, normal urethral meatus, vaginal mucosa pink, moist, well rugated, and normal cervix/adnexa/uterus without masses or discharge  MS: no gross musculoskeletal defects noted, no edema  SKIN: no suspicious lesions or rashes  NEURO: Normal strength and tone, mentation intact and speech normal  PSYCH: mentation appears normal, affect normal/bright    Diagnostic Test Results:  Labs reviewed in Epic  No results found for any visits on 07/11/22.    ASSESSMENT/PLAN:   1. Encounter for routine history and physical exam in female patient    - Lipid Panel (BFP)  - Basic Metabolic Panel ([facility])    2. Acquired hypothyroidism  Check labs, continue medications, refilled.  - levothyroxine (SYNTHROID/LEVOTHROID) 50 MCG tablet; Take 1 tablet (50 mcg) by mouth daily  Dispense: 90 tablet; Refill: 3  - TSH WITH FREE T4 REFLEX ([facility])    3. Hypertonic bladder/ Dr Del Toro      4. Personal history of tobacco use, presenting hazards to health    - CT Chest Lung Cancer Screen Low Dose Without; Future  - Radiology Referral; Future    Patient has been advised of split billing requirements and indicates understanding: Yes    COUNSELING:  Reviewed preventive health counseling, as reflected in patient instructions       Regular exercise       Healthy diet/nutrition    Estimated body mass index is 23.92 kg/m  as calculated from the following:    Height as of this encounter: 1.715 m (5' 7.5\").    Weight as of this encounter: 70.3 kg (155 lb).        She reports that she has quit smoking. She has never used smokeless tobacco.        Donna Rivas MD  [facility]  "

## 2022-07-12 LAB — TSH SERPL-ACNC: 3.89 MIU/L (ref 0.4–4.5)

## 2022-07-26 ENCOUNTER — TRANSFERRED RECORDS (OUTPATIENT)
Dept: FAMILY MEDICINE | Facility: CLINIC | Age: 62
End: 2022-07-26

## 2022-10-05 ENCOUNTER — TELEPHONE (OUTPATIENT)
Dept: UROLOGY | Facility: CLINIC | Age: 62
End: 2022-10-05

## 2022-10-05 DIAGNOSIS — R39.15 URINARY URGENCY: Primary | ICD-10-CM

## 2022-10-05 RX ORDER — TROSPIUM CHLORIDE 20 MG/1
20 TABLET, FILM COATED ORAL
Qty: 12 TABLET | Refills: 0 | Status: SHIPPED | OUTPATIENT
Start: 2022-10-05 | End: 2022-10-11

## 2022-10-05 NOTE — TELEPHONE ENCOUNTER
Called patient and informed her that a six say supply was sent to her pharmacy.     Rani Grant LPN

## 2022-10-05 NOTE — TELEPHONE ENCOUNTER
[facility] Call Center    Phone Message    May a detailed message be left on voicemail: yes     Reason for Call: Medication Question or concern regarding medication   Prescription Clarification  Name of Medication: trospium (SANCTURA) 20 MG tablet     Prescribing Provider:    Pharmacy: [facility] [id number] - [email or web address]   What on the order needs clarification? Patient called stating that she didn't know that she has to call in for the above medication to receive, patient is almost out and is wondering if she can receive a 6 day supply until she can receive her full refill, patient willing to pay out of pocket for medications, please call patient.          Action Taken: Message routed to:  [facility] ([facility] Uro    Travel Screening: Not Applicable

## 2022-10-10 ENCOUNTER — ALLIED HEALTH/NURSE VISIT (OUTPATIENT)
Dept: FAMILY MEDICINE | Facility: CLINIC | Age: 62
End: 2022-10-10

## 2022-10-10 DIAGNOSIS — Z23 NEED FOR VACCINATION: Primary | ICD-10-CM

## 2022-10-10 PROCEDURE — 90471 IMMUNIZATION ADMIN: CPT | Performed by: FAMILY MEDICINE

## 2022-10-10 PROCEDURE — 90686 IIV4 VACC NO PRSV 0.5 ML IM: CPT | Performed by: FAMILY MEDICINE

## 2022-11-22 ENCOUNTER — TRANSFERRED RECORDS (OUTPATIENT)
Dept: FAMILY MEDICINE | Facility: CLINIC | Age: 62
End: 2022-11-22

## 2022-12-30 ENCOUNTER — HOSPITAL ENCOUNTER (OUTPATIENT)
Dept: CT IMAGING | Facility: CLINIC | Age: 62
Discharge: HOME | End: 2022-12-30
Attending: FAMILY MEDICINE | Admitting: FAMILY MEDICINE
Payer: COMMERCIAL

## 2022-12-30 DIAGNOSIS — Z87.891 PERSONAL HISTORY OF TOBACCO USE, PRESENTING HAZARDS TO HEALTH: ICD-10-CM

## 2022-12-30 PROCEDURE — 71271 CT THORAX LUNG CANCER SCR C-: CPT

## 2023-01-03 ENCOUNTER — TELEPHONE (OUTPATIENT)
Dept: FAMILY MEDICINE | Facility: CLINIC | Age: 63
End: 2023-01-03

## 2023-01-03 NOTE — TELEPHONE ENCOUNTER
Bryanna from [facility] called to inform that there was an incidental finding.  The imaging report can be viewed in [facility].  Routing to Dr. Rivas to inform and for any follow up.    Thanks, Klaudia

## 2023-01-04 ENCOUNTER — TELEPHONE (OUTPATIENT)
Dept: OTHER | Facility: CLINIC | Age: 63
End: 2023-01-04

## 2023-01-04 NOTE — TELEPHONE ENCOUNTER
Patient is scheduled for her Consult Appointment with Dr Nathan on 01/19/23. Pt was offered a sooner appointment and declined.

## 2023-01-04 NOTE — TELEPHONE ENCOUNTER
Appt Note:  Referred to [facility] by Donna Rivas MD for Incidental Finding of Aneurysmal dilatation of the ascending thoracic aorta 4.4 cm on CT chest 12/30/22.    Pt needs to be scheduled for new patient consult with Vascular Medicine (Dr. Molina or Dr. Nathan).  Will route to scheduling to coordinate an appointment at next available.    JANET DeviN, RN  [facility] [place]

## 2023-01-23 ENCOUNTER — HOSPITAL ENCOUNTER (OUTPATIENT)
Dept: MAMMOGRAPHY | Facility: CLINIC | Age: 63
Discharge: HOME | End: 2023-01-23
Attending: FAMILY MEDICINE | Admitting: FAMILY MEDICINE
Payer: COMMERCIAL

## 2023-01-23 DIAGNOSIS — Z12.31 VISIT FOR SCREENING MAMMOGRAM: ICD-10-CM

## 2023-01-23 PROCEDURE — 77067 SCR MAMMO BI INCL CAD: CPT

## 2023-01-25 ENCOUNTER — OFFICE VISIT (OUTPATIENT)
Dept: OTHER | Facility: CLINIC | Age: 63
End: 2023-01-25
Attending: FAMILY MEDICINE
Payer: COMMERCIAL

## 2023-01-25 VITALS
HEART RATE: 99 BPM | BODY MASS INDEX: 27.49 KG/M2 | DIASTOLIC BLOOD PRESSURE: 94 MMHG | WEIGHT: 181.4 LBS | OXYGEN SATURATION: 100 % | SYSTOLIC BLOOD PRESSURE: 135 MMHG | HEIGHT: 68 IN

## 2023-01-25 DIAGNOSIS — F17.218 CIGARETTE NICOTINE DEPENDENCE WITH OTHER NICOTINE-INDUCED DISORDER: ICD-10-CM

## 2023-01-25 DIAGNOSIS — I77.810 ASCENDING AORTA DILATION (H): Primary | ICD-10-CM

## 2023-01-25 DIAGNOSIS — I10 BENIGN ESSENTIAL HYPERTENSION: ICD-10-CM

## 2023-01-25 DIAGNOSIS — E78.5 HYPERLIPIDEMIA LDL GOAL <70: ICD-10-CM

## 2023-01-25 PROCEDURE — 99205 OFFICE O/P NEW HI 60 MIN: CPT | Mod: 25 | Performed by: INTERNAL MEDICINE

## 2023-01-25 PROCEDURE — G0463 HOSPITAL OUTPT CLINIC VISIT: HCPCS

## 2023-01-25 PROCEDURE — 99406 BEHAV CHNG SMOKING 3-10 MIN: CPT | Performed by: INTERNAL MEDICINE

## 2023-01-25 RX ORDER — ROSUVASTATIN CALCIUM 10 MG/1
10 TABLET, COATED ORAL EVERY EVENING
Qty: 30 TABLET | Refills: 5 | Status: SHIPPED | OUTPATIENT
Start: 2023-01-25 | End: 2023-07-26

## 2023-01-25 NOTE — PATIENT INSTRUCTIONS
1.  Take Crestor 10 mg daily take at nighttime new prescription sent to the pharmacy daily  and go for fasting lipids, CMP labs in 3 months at primary then phone visit   2.  I will arrange echocardiogram please complete in [place] order placed   3. Quit smoking, let us know if you decided to use pills or patches or gum or inhaler etc  4.  Monitor blood pressure at home goal is less than 130/80 heart rate is less than 70  5.  We will plan for repeat CTA of chest abdomen pelvis in 1 year in the clinic

## 2023-01-25 NOTE — PROGRESS NOTES
"Patient is here to discuss consult    BP (!) 135/94 (BP Location: Left arm, Patient Position: Chair, Cuff Size: Adult Regular)   Pulse 99   Ht 5' 8\" (1.727 m)   Wt 181 lb 6.4 oz (82.3 kg)   LMP 06/20/2010   SpO2 100%   BMI 27.58 kg/m      Questions patient would like addressed today are: N/A.    Refills are needed: No    Has homecare services and agency name:  [facility] SAL[facility]ED[facility]"

## 2023-01-25 NOTE — PROGRESS NOTES
[facility] INITIAL VASCULAR MEDICINE CONSULT    ( New patient visit)     PRIMARY HEALTH CARE PROVIDER:  Donna Rivas MD      REFERRING HEALTH CARE PROVIDER;  Donna Rivas MD      REASON FOR CONSULT: Evaluation and management of ascending aorta dilatation 4.4 cm on recent lung cancer screening CT scan in a smoker, postmenopausal and family history of heart disease.      HPI: Patricia Ramirez is a 62 year old very pleasant female smoker smokes more than 2 decades currently 1 pack cigarettes every 3 days, hypothyroidism on replacement recently underwent lung cancer screening CT which revealed 4.4 cm ascending aorta dilatation.  She is asymptomatic.  She denies any chest pain, shortness of breath or back pain.  She has a family history of both parents with heart disease.  Her lipids are reasonable but LDL mid 90s not on any medications.  Previous blood pressure excellent range and she was stressed out finding the parking place and her blood pressure is elevated in the clinic today.  She recently retired.    PAST MEDICAL HISTORY  Past Medical History:   Diagnosis Date     Ascending aorta dilatation (H)      Complication of anesthesia      Mumps      MV COLLISION NOS- 9/19/06/ cervicalgia 10/05/2006     Spider veins        CURRENT MEDICATIONS  clobetasol (TEMOVATE) 0.05 % external cream, Apply  topically 2 times daily. (should not be used on face or in groin)  GLUCOSAMINE HCL--250 MG OR TABS, 1 TABLET DAILY  levothyroxine (SYNTHROID/LEVOTHROID) 50 MCG tablet, Take 1 tablet (50 mcg) by mouth daily  MULTIPLE VITAMINS/WOMENS OR TABS, 1 TABLET DAILY  oxybutynin (DITROPAN) 5 MG tablet, Take 1 tablet (5 mg) by mouth 2 times daily  trospium (SANCTURA) 20 MG tablet, Take 1 tablet (20 mg) by mouth 2 times daily (before meals)    No current facility-administered medications on file prior to visit.      PAST SURGICAL HISTORY:  Past Surgical History:   Procedure Laterality Date      CYSTOSCOPY       ZZHC COLONOSCOPY THRU STOMA, DIAGNOSTIC  6/2009    Normal       ALLERGIES     Allergies   Allergen Reactions     Acetaminophen Nausea and Vomiting     Hydrocodone Nausea and Vomiting     Other [No Clinical Screening - See Comments] Nausea and Vomiting     Gets sick feeling off all narcotics     Oxycodone Nausea and Vomiting       FAMILY HISTORY  Family History   Problem Relation Age of Onset     Hypertension Mother      Cerebrovascular Disease Mother      Heart Disease Mother      Cancer Maternal Grandmother         lung & breast     Cancer Maternal Aunt         lung     Diabetes No family hx of        VASCULAR FAMILY HISTORY  1st order relative with atherosclerotic PAD: No  1st order relative with AAA: No  Family history of Familial Hyperlipidemia No  Family History of Hypercoagulable state:No    VASCULAR RISK FACTORS  1. Diabetes:No   2. Smoking: currently smokes.  Advised about smoking cessation.  3. HTN: normotensive  4.Hyperlipidemia: Yes - LDL goal less than 70       SOCIAL HISTORY  Social History     Socioeconomic History     Marital status:      Spouse name: Not on file     Number of children: 4     Years of education: Not on file     Highest education level: Not on file   Occupational History     Not on file   Tobacco Use     Smoking status: Former     Smokeless tobacco: Never     Tobacco comments:     quit 25-30 ago   Substance and Sexual Activity     Alcohol use: Yes     Alcohol/week: 0.0 standard drinks     Comment: social      Drug use: No     Sexual activity: Yes     Partners: Male     Birth control/protection: Spermicide, Post-menopausal   Other Topics Concern     Parent/sibling w/ CABG, MI or angioplasty before 65F 55M? Not Asked   Social History Narrative     Not on file     Social Determinants of Health     Financial Resource Strain: Low Risk      Difficulty of Paying Living Expenses: Not hard at all   Food Insecurity: No Food Insecurity     Worried About Running Out of  "Food in the Last Year: Never true     Ran Out of Food in the Last Year: Never true   Transportation Needs: No Transportation Needs     Lack of Transportation (Medical): No     Lack of Transportation (Non-Medical): No   Physical Activity: Not on file   Stress: Not on file   Social Connections: Not on file   Intimate Partner Violence: Not on file   Housing Stability: Not on file       ROS:   General: No change in weight, sleep or appetite.  Normal energy.  No fever or chills  Eyes: Negative for vision changes or eye problems  ENT: No problems with ears, nose or throat.  No difficulty swallowing.  Resp: No coughing, wheezing or shortness of breath  CV: No chest pains or palpitations  GI: No nausea, vomiting,  heartburn, abdominal pain, diarrhea, constipation or change in bowel habits  : No urinary frequency or dysuria, bladder or kidney problems  Musculoskeletal: No significant muscle or joint pains  Neurologic: No headaches, numbness, tingling, weakness, problems with balance or coordination  Psychiatric: No problems with anxiety, depression or mental health  Heme/immune/allergy: No history of bleeding or clotting problems or anemia.  No allergies or immune system problems  Endocrine: No history of thyroid disease, diabetes or other endocrine disorders  Skin: No rashes,worrisome lesions or skin problems  Vascular:    No claudication symptoms, no leg edema, no chest pain or shortness of breath    EXAM:  BP (!) 135/94 (BP Location: Left arm, Patient Position: Chair, Cuff Size: Adult Regular)   Pulse 99   Ht 5' 8\" (1.727 m)   Wt 181 lb 6.4 oz (82.3 kg)   LMP 06/20/2010   SpO2 100%   BMI 27.58 kg/m    In general, the patient is a pleasant female in no apparent distress.    HEENT: NC/AT.  PERRLA.  EOMI.  Sclerae white, not injected.  Nares clear.  Pharynx without erythema or exudate.  Dentition intact.    Neck: No adenopathy.  No thyromegaly. Carotids +2/2 bilaterally without bruits.  No jugular venous distension. "   Heart: RRR. Normal S1, S2 splits physiologically. No murmur, rub, click, or gallop. The PMI is in the 5th ICS in the midclavicular line. There is no heave.    Lungs: CTA.  No ronchi, wheezes, rales.  No dullness to percussion.   Abdomen: Soft, nontender, nondistended. No organomegaly. No AAA.  No bruits.   Extremities: Vascular:  Bilateral symmetrical palpable peripheral pulses in both upper and lower extremities  No carotid bruit  No varicose veins      Labs:  LIPID RESULTS:  Lab Results   Component Value Date    CHOL 169 07/11/2022    HDL 65 07/11/2022    LDL 93 07/11/2022     (H) 06/29/2018    TRIG 57 07/11/2022    CHOLHDLRATIO 3 07/11/2022       LIVER ENZYME RESULTS:  Lab Results   Component Value Date    AST 19 08/25/2014    ALT 19 08/25/2014       CBC RESULTS:  Lab Results   Component Value Date    WBC 8.7 10/01/2021    RBC 4.18 10/01/2021    HGB 13.7 10/01/2021    HCT 41.7 10/01/2021    MCV 99.7 10/01/2021    MCH 32.8 10/01/2021    MCHC 32.9 10/01/2021    RDW 12.2 07/03/2019     10/01/2021       BMP RESULTS:  Lab Results   Component Value Date    .3 07/11/2022    POTASSIUM 4.53 07/11/2022    CHLORIDE 104.9 07/11/2022    CO2 30.7 07/11/2022    GLC 87 07/11/2022    BUN 17 07/11/2022    BUN 16.8 07/11/2022    CR 1.01 07/11/2022    GFRESTIMATED 66 06/22/2017    ROBINSON 9.8 07/11/2022        THYROID RESULTS:  Lab Results   Component Value Date    TSH 3.89 07/11/2022       Procedures:     CT CHEST LUNG CANCER SCREEN LOW DOSE WITHOUT  12/30/2022 3:30 PM     HISTORY: Lung cancer screening. No chest CT for lung cancer screening  in the last year; 50-80 years; >= 20 pack-year smoking history. Former  smoker. Smoking quit date within the last 15 years. Personal history  of tobacco use, presenting hazards to health.     TECHNIQUE: Scans obtained from the apices through the diaphragm  without IV contrast. Low dose CT chest technique was utilized.  Radiation dose for this scan was reduced using automated  "exposure  control, adjustment of the mA and/or kV according to patient size, or  iterative reconstruction technique.     COMPARISON:  None available     FINDINGS:   Chest/mediastinum: No cardiomegaly or significant pericardial  effusion. Mild atherosclerotic vascular calcification of the coronary  arteries. Aneurysmal dilatation of ascending thoracic aorta measuring  4.4 cm in AP dimension. No significant mediastinal or hilar  lymphadenopathy. Small to moderate size hiatal hernia.     Lung/pleura: No pleural effusion or pneumothorax. A few calcified  pulmonary granulomas. A few scattered areas of bronchiolar mucoid  impaction, for example, in the left upper lobe (series 6 image 145),  nonspecific, can be seen with respiratory bronchitis.     Upper abdomen: Limits evaluation of the upper abdomen due to lack of  coverage, contrast and low-dose technique.     Bones and soft tissue: Degenerative changes of the spine. A few  vertebral hemangiomas, most prominent in T9 and T11 vertebrae.                                                                      IMPRESSION:   1. ACR Assessment Category:  Lung-RADS Category 1. Negative, continue  annual screening, if clinically relevant (please order exam code IMG  2290).   2. Significant Incidental Finding(s):  Category S: Yes.   *  Aneurysmal dilatation of the ascending thoracic aorta measuring 4.4  cm in AP dimension.   *  Small to moderate size hiatal hernia.     Download the \"LungRADS Assessment Categories\" table at this site:   http://www.acr.org/Quality-Safety/Resources/LungRADS     SIGRID ABDUL MD        Assessment and Plan:     1. Ascending aorta dilation (H) 4.4 cm on lung cancer screening CT 12/30/2022  2. Benign essential hypertension  3. Cigarette nicotine dependence with other nicotine-induced disorder  - SMOKING CESSATION COUNSELING, 3-10 MIN  4. Hyperlipidemia LDL goal <70      This is a very pleasant 62-year-old female incidental finding of ascending aorta " dilatation 4.4 cm on lung cancer screening CT and she is a smoker smokes 1/3 pack cigarettes daily for 2 decades.  She is normotensive all her previous visits except today blood pressure is elevated initially she was late and stressed out but repeat blood pressure is improved.  CT scan also showed some atherosclerosis of coronary arteries  She has a family history of heart disease in both parents does not know full details    I reviewed CT scan with the patient  Suggested aggressive control of the risk factors specifically,  She must quit smoking offered help patches, gum, pills, inhalers or combination but she wanted to quit on her own  Monitor blood pressure at home goal is less than 130/80 and heart rate is under 70 if it is elevated she will benefit with either ARB or beta-blocker  Given mild atherosclerosis of coronary arteries, postmenopausal, ascending aorta dilatation will initiate Crestor 10 mg daily and repeat lipid panel and CMP labs in 3 months order placed  We will also arrange echocardiogram    Virtual visit in 3 or more months after completion of the fasting lipids    Plan for repeat CTA of chest abdomen pelvis in 1 year then followed by office visit    - Lipid panel reflex to direct LDL Fasting; Future  - rosuvastatin (CRESTOR) 10 MG tablet; Take 1 tablet (10 mg) by mouth every evening  Dispense: 30 tablet; Refill: 5  - Echocardiogram Complete; Future    65  minutes spent on the date of the encounter doing chart review, history and exam, documentation, and further activities as noted above.  She is new to this clinic reviewed available records in the epic and updated chart.  AVS with written instructions given  She had a lot of questions and all of them were answered    Donald Nathan MD, HUI, FSVM, FNLA, FACP  Vascular medicine  Clinical hypertension specialist  Clinical lipidologist

## 2023-01-26 ENCOUNTER — TELEPHONE (OUTPATIENT)
Dept: OTHER | Facility: CLINIC | Age: 63
End: 2023-01-26
Payer: COMMERCIAL

## 2023-01-26 NOTE — TELEPHONE ENCOUNTER
Follow-up to 1/25/23      Echocardiogram    Results will be communicated via [email or web address] or telephone.

## 2023-02-01 NOTE — TELEPHONE ENCOUNTER
Future Appointments   Date Time Provider Department Center   2/17/2023  2:45 PM [id number]

## 2023-02-17 ENCOUNTER — HOSPITAL ENCOUNTER (OUTPATIENT)
Dept: CARDIOLOGY | Facility: CLINIC | Age: 63
Discharge: HOME | End: 2023-02-17
Attending: INTERNAL MEDICINE | Admitting: INTERNAL MEDICINE
Payer: COMMERCIAL

## 2023-02-17 DIAGNOSIS — I77.810 ASCENDING AORTA DILATION (H): ICD-10-CM

## 2023-02-17 LAB — LVEF ECHO: NORMAL

## 2023-02-17 PROCEDURE — 93306 TTE W/DOPPLER COMPLETE: CPT

## 2023-02-17 PROCEDURE — 93306 TTE W/DOPPLER COMPLETE: CPT | Mod: 26 | Performed by: INTERNAL MEDICINE

## 2023-02-22 ENCOUNTER — TELEPHONE (OUTPATIENT)
Dept: OTHER | Facility: CLINIC | Age: 63
End: 2023-02-22
Payer: COMMERCIAL

## 2023-02-22 NOTE — TELEPHONE ENCOUNTER
----- Message from Donald Nathan MD sent at 2023  6:36 PM CST -----  Echo looks good, stable ascending aorta dilatation  (The ascending aorta is Mildly dilated.(4.2cm))  Continue same plan discussed in the clinic       Attempted to call patient to relay above results and reiterate plan of care from 23 office visit includin.  Take Crestor 10 mg daily take at nighttime new prescription sent to the pharmacy daily  and go for fasting lipids, CMP labs in 3 months at primary then phone visit     2.  I will arrange echocardiogram please complete in [place] order placed  (already done)     3. Quit smoking, let us know if you decided to use pills or patches or gum or inhaler etc    4.  Monitor blood pressure at home goal is less than 130/80 heart rate is less than 70    5.  We will plan for repeat CTA of chest abdomen pelvis in 1 year in the clinic     Patient's voicemail was full, unable to leave message.

## 2023-03-01 NOTE — TELEPHONE ENCOUNTER
2nd attempt to call patient to relay results.  Voicemail box was again full.  Unable to leave message.

## 2023-03-09 ENCOUNTER — TELEPHONE (OUTPATIENT)
Dept: OTHER | Facility: CLINIC | Age: 63
End: 2023-03-09
Payer: COMMERCIAL

## 2023-03-09 NOTE — TELEPHONE ENCOUNTER
See encounter from 2/22/23.  Attempted on 2/22/23 and 2/1/22 to call patient with results.  There was no answer and the voicemail box was full.    Again tried to call today.  Again, there was no answer and the voicemail box was full.    Denise Doan RN BSN  [facility]  [phone]

## 2023-03-10 NOTE — TELEPHONE ENCOUNTER
Called patient via cell phone to relay results.  Updated chart to reflect cell phone as preferred method of contact.    Relayed all results.  Patient will call to schedule telephone visit with Dr. Nathan in about three months after her labs have been drawn.    Denise Doan RN BSN  [facility]  [phone]

## 2023-07-05 ENCOUNTER — VIRTUAL VISIT (OUTPATIENT)
Dept: UROLOGY | Facility: CLINIC | Age: 63
End: 2023-07-05
Payer: COMMERCIAL

## 2023-07-05 VITALS — WEIGHT: 160 LBS | BODY MASS INDEX: 24.25 KG/M2 | HEIGHT: 68 IN

## 2023-07-05 DIAGNOSIS — R39.15 URINARY URGENCY: Primary | ICD-10-CM

## 2023-07-05 DIAGNOSIS — R35.0 FREQUENCY OF URINATION: ICD-10-CM

## 2023-07-05 PROCEDURE — 99213 OFFICE O/P EST LOW 20 MIN: CPT | Mod: VID | Performed by: PHYSICIAN ASSISTANT

## 2023-07-05 RX ORDER — TROSPIUM CHLORIDE 20 MG/1
20 TABLET, FILM COATED ORAL
Qty: 180 TABLET | Refills: 3 | Status: SHIPPED | OUTPATIENT
Start: 2023-07-05 | End: 2024-01-10

## 2023-07-05 ASSESSMENT — ENCOUNTER SYMPTOMS
DYSURIA: 0
CONSTITUTIONAL NEGATIVE: 1
FREQUENCY: 1
DIFFICULTY URINATING: 0
CONSTIPATION: 0
HEMATURIA: 0

## 2023-07-05 ASSESSMENT — PAIN SCALES - GENERAL: PAINLEVEL: NO PAIN (0)

## 2023-07-05 NOTE — PATIENT INSTRUCTIONS
-We will plan on continuing with trospium 20 mg twice daily.  Refill sent for 1 year.    -If worsening urinary frequency or nighttime urination, we will be contacted.  Would plan on postvoid residual and discussion of other treatment options including trial of Myrbetriq or Gemtesa, pelvic floor physical therapy, or posterior tibial nerve stimulation.    -Tentative plan to follow-up in 1 year with office visit, urinalysis, and postvoid residual with med check.    Contact us in the interim with questions, concerns, or changes in symptomatology.  [phone]

## 2023-07-05 NOTE — LETTER
7/5/2023       RE: Patricia Ramirez  [address] [facility] [address]     Dear Colleague,    Thank you for referring your patient, Patricia Ramirez, to the [facility] [place] [facility]. Please see a copy of my visit note below.    ** Send link to cell phone    Patricia is a 62 year old who is being evaluated via a billable video visit.      How would you like to obtain your AVS? MyChart  If the video visit is dropped, the invitation should be resent by: Text to cell phone: [phone]  Will anyone else be joining your video visit? No        Video-Visit Details    Type of service:  Video Visit     Originating Location (pt. Location): Home    Distant Location (provider location):  On-site  Platform used for Video Visit: [employer]     ON: 1615  OFF: 1627    CHIEF COMPLAINT/REASON FOR VISIT   Follow up on nocturia and urinary frequency    HISTORY OF PRESENT ILLNESS   Ms. Ramirez is very pleasant 62 year old year old female, who presents today for follow-up on urinary urgency and frequency.  I last saw her in June 2022.  She previously been on oxybutynin, but had difficulties with dry mouth even with Biotene.  She did feel like it really helped her urinary symptoms of urgency and frequency of urination.    At her last visit, patient was switched to trospium 20 mg twice daily.  She is continued on this.  She denies any constipation with it.  She does note that it does not work quite as well as the oxybutynin, but the adverse effects are much better.  She typically has nocturia 0-1 times.  She tries to restrict fluid and is able typically to balance her nocturia.  Her urinary frequency is approximately every 2-3 hours.  She is not wearing any pads daily.    The following portions of the patient's history were reviewed and updated as appropriate: allergies, current medications, past family history, past medical history, past social history,  past surgical history, and problem list.     REVIEW OF SYSTEMS   Review of Systems   Constitutional: Negative.    Gastrointestinal: Negative for constipation.   Genitourinary: Positive for frequency. Negative for difficulty urinating, dysuria and hematuria.      Per HPI.     Patient Active Problem List   Diagnosis    Acquired hypothyroidism    Contact dermatitis and other eczema due to plants (except food)    Health Care Home    Hypertonic bladder/ Dr Del Toro    Hepatitis B antibody positive in blood      Past Medical History:   Diagnosis Date    Ascending aorta dilatation (H)     Complication of anesthesia     Mumps     MV COLLISION NOS- 9/19/06/ cervicalgia 10/05/2006    Spider veins         Objective      PHYSICAL EXAM   GENERAL: Healthy, alert and no distress  EYES: Eyes grossly normal to inspection.  No discharge or erythema, or obvious scleral/conjunctival abnormalities.  HENT: Normal cephalic/atraumatic.  External ears, nose and mouth without ulcers or lesions.  No nasal drainage visible.  NECK: No asymmetry, visible masses or scars  RESP: No audible wheeze, cough, or visible cyanosis.  No visible retractions or increased work of breathing.    MS: No gross musculoskeletal defects noted.  Normal range of motion.  No visible edema.  SKIN: Visible skin clear. No significant rash, abnormal pigmentation or lesions.  NEURO: Cranial nerves grossly intact.  Mentation and speech appropriate for age.  PSYCH: Mentation appears normal, affect normal/bright, judgement and insight intact, normal speech and appearance well-groomed.     LABORATORY     Recent Labs   Lab Test 06/21/22  1528 06/21/22  1443   COLOR  --  Yellow   APPEARANCE  --  Clear   URINEGLC  --  Negative   URINEBILI  --  Negative   URINEKETONE  --  Negative   SG  --  >=1.030   UBLD  --  Trace*   URINEPH  --  5.5   PROTEIN  --  Negative   UROBILINOGEN  --  0.2   NITRITE  --  Negative   LEUKEST  --  Small*   RBCU 2  --    WBCU 4  --        TESTING    PVR  in 2022: 93 mL    Assessment & Plan    1. Urinary urgency    2. Frequency of urination        I had the pleasure today of meeting with Ms. Ramirez to discuss her her urinary frequency, urgency, and previous nocturia.  Symptoms are relatively well managed on trospium 20 mg twice daily.  She is able to tolerate the side effects better than the oxybutynin.  We discussed possible other options including a trial of a beta 3 agonist, pelvic floor physical therapy, or posterior tibial nerve stimulation.  After discussion, patient will continue as she is.    -We will plan on continuing with trospium 20 mg twice daily.  Refill sent for 1 year.    -If worsening urinary frequency or nighttime urination, we will be contacted.  Would plan on postvoid residual and discussion of other treatment options including trial of Myrbetriq or Gemtesa, pelvic floor physical therapy, or posterior tibial nerve stimulation.    -Tentative plan to follow-up in 1 year with office visit, urinalysis, and postvoid residual with med check.    Signed by:       Abi Ambriz PA-C 7/5/2023 4:12 PM

## 2023-07-10 DIAGNOSIS — E03.9 ACQUIRED HYPOTHYROIDISM: ICD-10-CM

## 2023-07-11 RX ORDER — LEVOTHYROXINE SODIUM 50 UG/1
TABLET ORAL
Qty: 90 TABLET | Refills: 3 | COMMUNITY
Start: 2023-07-11

## 2023-07-11 NOTE — TELEPHONE ENCOUNTER
Patricia Ramirez is requesting a refill of:    Refused Prescriptions:                       Disp   Refills    levothyroxine (SYNTHROID/LEVOTHROID) 50 MC*90 tab*3        Sig: TAKE ONE TABLET BY MOUTH EVERY DAY  Refused By: RUSS LICEA  Reason for Refusal: Patient needs appointment    Pt has appt scheduled for 07/14, will refill at her upcoming appt

## 2023-07-12 NOTE — PROGRESS NOTES
SUBJECTIVE:   CC: Patricia is an 62 year old who presents for preventive health visit.     HPI      Her efor a physical. She is doing well.  She is fasting.  She is seeing a vascular doctor, they are watching her cholesterol. Is now on crestor.    Since she retired, legs are sore when walking up hill, legs get sore. Has never had this before.  This has been for a year. Will have a flup apt with her vascular specialist.            Social History     Tobacco Use     Smoking status: Former     Smokeless tobacco: Never     Tobacco comments:     quit 25-30 ago   Substance Use Topics     Alcohol use: Yes     Alcohol/week: 0.0 standard drinks of alcohol     Comment: social               No data to display            no concerns  Reviewed orders with patient.  Reviewed health maintenance and updated orders accordingly - Yes  BP Readings from Last 3 Encounters:   07/14/23 124/84   01/25/23 (!) 135/94   07/11/22 112/74    Wt Readings from Last 3 Encounters:   07/14/23 77.6 kg (171 lb)   07/05/23 72.6 kg (160 lb)   01/25/23 82.3 kg (181 lb 6.4 oz)                  Patient Active Problem List   Diagnosis     Acquired hypothyroidism     Contact dermatitis and other eczema due to plants (except food)     Health Care Home     ACP (advance care planning)     Hypertonic bladder/ Dr Del Toro--followed by urology     Hepatitis B antibody positive in blood--evaluated by GI     Aortic dilatation (H)--followed by cardiology     Past Surgical History:   Procedure Laterality Date     CYSTOSCOPY       ZZHC COLONOSCOPY THRU STOMA, DIAGNOSTIC  6/2009    Normal       Social History     Tobacco Use     Smoking status: Former     Passive exposure: Past     Smokeless tobacco: Never   Substance Use Topics     Alcohol use: Yes     Alcohol/week: 0.0 standard drinks of alcohol     Comment: social      Family History   Problem Relation Age of Onset     Hypertension Mother      Cerebrovascular Disease Mother      Heart Disease Mother      Cancer  Maternal Grandmother         lung & breast     Cancer Maternal Aunt         lung     Diabetes No family hx of          Current Outpatient Medications   Medication Sig Dispense Refill     clobetasol (TEMOVATE) 0.05 % external cream Apply  topically 2 times daily. (should not be used on face or in groin) 60 g 0     GLUCOSAMINE HCL--250 MG OR TABS 1 TABLET DAILY       levothyroxine (SYNTHROID/LEVOTHROID) 50 MCG tablet Take 1 tablet (50 mcg) by mouth daily 90 tablet 3     MULTIPLE VITAMINS/WOMENS OR TABS 1 TABLET DAILY       rosuvastatin (CRESTOR) 10 MG tablet Take 1 tablet (10 mg) by mouth every evening 30 tablet 5     trospium (SANCTURA) 20 MG tablet Take 1 tablet (20 mg) by mouth 2 times daily (before meals) 180 tablet 3       Breast Cancer Screening:  Any new diagnosis of family breast, ovarian, or bowel cancer? Yes       FHS-7:       1/21/2022     9:18 AM 1/23/2023    11:00 AM   Breast CA Risk Assessment (FHS-7)   Did any of your first-degree relatives have breast or ovarian cancer? Yes Yes   Did any of your relatives have bilateral breast cancer? No No   Did any man in your family have breast cancer? No No   Did any woman in your family have breast and ovarian cancer? No No   Did any woman in your family have breast cancer before age 50 y? No No   Do you have 2 or more relatives with breast and/or ovarian cancer? Yes Yes   Do you have 2 or more relatives with breast and/or bowel cancer? Yes Yes     Has seen genetic counselor, doesn't carry the gene. Has also discussed with her daughters.  mammogram up to date  Pertinent mammograms are reviewed under the imaging tab.    History of abnormal Pap smear: pap history normal, no recent vaginal bleeding.     Reviewed and updated as needed this visit by clinical staff                  Reviewed and updated as needed this visit by Provider                 Past Medical History:   Diagnosis Date     Ascending aorta dilatation (H)      Complication of anesthesia       Mumps      MV COLLISION NOS- 9/19/06/ cervicalgia 10/05/2006     Spider veins       Past Surgical History:   Procedure Laterality Date     CYSTOSCOPY       ZZHC COLONOSCOPY THRU STOMA, DIAGNOSTIC  6/2009    Normal       Review of Systems  CONSTITUTIONAL: NEGATIVE for fever, chills, change in weight  INTEGUMENTARY/SKIN: NEGATIVE for worrisome rashes, moles or lesions  EYES: NEGATIVE for vision changes or irritation  ENT: NEGATIVE for ear, mouth and throat problems  RESP: NEGATIVE for significant cough or SOB  BREAST: NEGATIVE for masses, tenderness or discharge  CV: NEGATIVE for chest pain, palpitations or peripheral edema  GI: NEGATIVE for nausea, abdominal pain, heartburn, or change in bowel habits  : NEGATIVE for unusual urinary or vaginal symptoms. No vaginal bleeding.  MUSCULOSKELETAL: NEGATIVE for significant arthralgias or myalgia  NEURO: NEGATIVE for weakness, dizziness or paresthesias  PSYCHIATRIC: NEGATIVE for changes in mood or affect      OBJECTIVE:   LMP 06/20/2010   Physical Exam  GENERAL: healthy, alert and no distress  EYES: Eyes grossly normal to inspection, PERRL and conjunctivae and sclerae normal  HENT: ear canals and TM's normal, nose and mouth without ulcers or lesions  NECK: no adenopathy, no asymmetry, masses, or scars and thyroid normal to palpation  RESP: lungs clear to auscultation - no rales, rhonchi or wheezes  BREAST: normal without masses, tenderness or nipple discharge and no palpable axillary masses or adenopathy  CV: regular rate and rhythm, normal S1 S2, no S3 or S4, no murmur, click or rub, no peripheral edema and peripheral pulses strong  ABDOMEN: soft, nontender, no hepatosplenomegaly, no masses and bowel sounds normal   (female): normal female external genitalia, normal urethral meatus, vaginal mucosa pink, moist, well rugated, and normal cervix/adnexa/uterus without masses or discharge  MS: no gross musculoskeletal defects noted, no edema  SKIN: no suspicious lesions  or rashes  NEURO: Normal strength and tone, mentation intact and speech normal  PSYCH: mentation appears normal, affect normal/bright  LE: no pedal edema, no calf tenderness to palpation    Diagnostic Test Results:  Labs reviewed in Epic  Results for orders placed or performed in visit on 07/14/23   TSH WITH FREE T4 REFLEX ([facility])     Status: None   Result Value Ref Range    TSH 4.28 0.40 - 4.50 mIU/L   Lipid Panel ([facility])     Status: None   Result Value Ref Range    Cholesterol 144 0 - 199 mg/dL    Triglycerides 64 0 - 149 mg/dL    HDL Cholesterol 62 40 - 150 mg/dL    LDL Cholesterol Direct 69 0 - 130 mg/dL    Cholesterol/HDL Ratio 2 0 - 5   Comprehensive Metobolic Panel ([facility])     Status: Abnormal   Result Value Ref Range    Carbon Dioxide 29.2 20 - 32 mmol/L    Creatinine 0.90 0.60 - 1.30 mg/dL    Glucose 89 60 - 99 mg/dL    Sodium 140.4 135 - 146 mmol/L    Potassium 4.38 3.5 - 5.3 mmol/L    Chloride 104.2 98 - 110 mmol/L    Protein Total 6.9 6.1 - 8.1 g/dL    Albumin 4.4 3.6 - 5.1 g/dL    Alkaline Phosphatase 44 33 - 130 U/L    ALT 33 (A) 0 - 32 U/L    AST 25 0 - 35 U/L    Bilirubin Total 0.7 0.2 - 1.2 mg/dL    Urea Nitrogen 15 7 - 25 mg/dL    Calcium 9.6 8.6 - 10.3 mg/dL    BUN/Creatinine Ratio 16.7 6 - 32    Globulin Calculated 2.5 1.9 - 3.7    A/G Ratio 1.8 1 - 2.5       ASSESSMENT/PLAN:   1. Encounter for routine history and physical exam in female patient      2. Acquired hypothyroidism  Check labs, refilled medications.  - levothyroxine (SYNTHROID/LEVOTHROID) 50 MCG tablet; Take 1 tablet (50 mcg) by mouth daily  Dispense: 90 tablet; Refill: 3  - VENOUS COLLECTION  - TSH WITH FREE T4 REFLEX ([facility])    3. Screening for cervical cancer    - ThinPrep Pap and HPV (mRNa E6/E7) ([facility])    4. Aortic dilatation (H)  Followed by cardiology.    5. Bilateral leg pain  She is seeing vascular specialist. Her pain is with walking, she should discuss first with them at her upcoming apt and if not related to vascular problem,  will schedule a followup apt with me to discuss further.    6. Benign essential hypertension  Check labs.  - Lipid Panel ([facility])  - Comprehensive Metobolic Panel ([facility])    Patient has been advised of split billing requirements and indicates understanding: Yes      COUNSELING:  Reviewed preventive health counseling, as reflected in patient instructions       Regular exercise       Healthy diet/nutrition        She reports that she has quit smoking. She has never used smokeless tobacco.      Donna Rivas MD  [facility]

## 2023-07-14 ENCOUNTER — OFFICE VISIT (OUTPATIENT)
Dept: FAMILY MEDICINE | Facility: CLINIC | Age: 63
End: 2023-07-14

## 2023-07-14 VITALS
BODY MASS INDEX: 25.91 KG/M2 | TEMPERATURE: 97.1 F | HEART RATE: 77 BPM | WEIGHT: 171 LBS | SYSTOLIC BLOOD PRESSURE: 124 MMHG | OXYGEN SATURATION: 97 % | HEIGHT: 68 IN | DIASTOLIC BLOOD PRESSURE: 84 MMHG

## 2023-07-14 DIAGNOSIS — I77.819 AORTIC DILATATION (H): ICD-10-CM

## 2023-07-14 DIAGNOSIS — M79.604 BILATERAL LEG PAIN: ICD-10-CM

## 2023-07-14 DIAGNOSIS — Z12.4 SCREENING FOR CERVICAL CANCER: ICD-10-CM

## 2023-07-14 DIAGNOSIS — E03.9 ACQUIRED HYPOTHYROIDISM: ICD-10-CM

## 2023-07-14 DIAGNOSIS — M79.605 BILATERAL LEG PAIN: ICD-10-CM

## 2023-07-14 DIAGNOSIS — I10 BENIGN ESSENTIAL HYPERTENSION: ICD-10-CM

## 2023-07-14 DIAGNOSIS — Z00.00 ENCOUNTER FOR ROUTINE HISTORY AND PHYSICAL EXAM IN FEMALE PATIENT: Primary | ICD-10-CM

## 2023-07-14 LAB
ALBUMIN SERPL-MCNC: 4.4 G/DL (ref 3.6–5.1)
ALBUMIN/GLOB SERPL: 1.8 {RATIO} (ref 1–2.5)
ALP SERPL-CCNC: 44 U/L (ref 33–130)
ALT 1742-6: 33 U/L (ref 0–32)
AST 1920-8: 25 U/L (ref 0–35)
BILIRUB SERPL-MCNC: 0.7 MG/DL (ref 0.2–1.2)
BUN SERPL-MCNC: 15 MG/DL (ref 7–25)
BUN/CREATININE RATIO: 16.7 (ref 6–32)
CALCIUM SERPL-MCNC: 9.6 MG/DL (ref 8.6–10.3)
CHLORIDE SERPLBLD-SCNC: 104.2 MMOL/L (ref 98–110)
CHOLEST SERPL-MCNC: 144 MG/DL (ref 0–199)
CHOLEST/HDLC SERPL: 2 {RATIO} (ref 0–5)
CO2 SERPL-SCNC: 29.2 MMOL/L (ref 20–32)
CREAT SERPL-MCNC: 0.9 MG/DL (ref 0.6–1.3)
GLOBULIN, CALCULATED - QUEST: 2.5 (ref 1.9–3.7)
GLUCOSE SERPL-MCNC: 89 MG/DL (ref 60–99)
HDLC SERPL-MCNC: 62 MG/DL (ref 40–150)
LDLC SERPL CALC-MCNC: 69 MG/DL (ref 0–130)
POTASSIUM SERPL-SCNC: 4.38 MMOL/L (ref 3.5–5.3)
PROT SERPL-MCNC: 6.9 G/DL (ref 6.1–8.1)
SODIUM SERPL-SCNC: 140.4 MMOL/L (ref 135–146)
TRIGL SERPL-MCNC: 64 MG/DL (ref 0–149)

## 2023-07-14 PROCEDURE — 99396 PREV VISIT EST AGE 40-64: CPT | Performed by: FAMILY MEDICINE

## 2023-07-14 PROCEDURE — 36415 COLL VENOUS BLD VENIPUNCTURE: CPT | Performed by: FAMILY MEDICINE

## 2023-07-14 PROCEDURE — 80053 COMPREHEN METABOLIC PANEL: CPT | Performed by: FAMILY MEDICINE

## 2023-07-14 PROCEDURE — 80061 LIPID PANEL: CPT | Performed by: FAMILY MEDICINE

## 2023-07-14 RX ORDER — LEVOTHYROXINE SODIUM 50 UG/1
50 TABLET ORAL DAILY
Qty: 90 TABLET | Refills: 3 | Status: SHIPPED | OUTPATIENT
Start: 2023-07-14 | End: 2024-07-16

## 2023-07-14 NOTE — NURSING NOTE
Chief Complaint   Patient presents with     Physical     Fasting cpx     Leg Pain     Calf pain when walking up hill     Foot Pain     Pt has a foot pain/stiffness after standing up         Pre-visit Screening:  Immunizations:  up to date  Colonoscopy:  is up to date  Mammogram: is up to date  Asthma Action Test/Plan:  NA  PHQ9:  NA  GAD7:  NA  Questioned patient about current smoking habits Pt. Quit some time ago  Ok to leave detailed message on voice mail for today's visit only Yes, phone # [phone]

## 2023-07-14 NOTE — LETTER
July 27, 2023      Patricia Ramirez  [address] [facility] [address]        Dear ,    We are writing to inform you of your test results.    Your labs were all good:  Lipids, thyroid, kidney and liver function, pap.  1 liver number was slightly high. We will watch this.    Resulted Orders   ThinPrep Pap and HPV (mRNa E6/E7) ([facility])   Result Value Ref Range    Clinical History SEE COMMENT       Comment:      SCREENING    LMP SEE COMMENT       Comment:      PM    Last Pap Diagnosis SEE COMMENT       Comment:      06/29/2020    Prev Bx Dx SEE COMMENT       Comment:      [id number]    Source SEE COMMENT       Comment:      Cervix    Statement of Adequacy SEE COMMENT       Comment:      Satisfactory for evaluation.  Endocervical/transformation zone component  present.      Descriptive Diagnosis SEE COMMENT       Comment:      Cytology Results: Negative for intraepithelial  lesion or malignancy.       Cytotechnologist: SEE COMMENT       Comment:      KARINA GANN (ASCP)  CT Screening location: [address]  [address]      Comment SEE COMMENT       Comment:      EXPLANATORY NOTE:      The Pap is a screening test for cervical cancer. It is   not a diagnostic test and is subject to false negative   and false positive results. It is most reliable when a   satisfactory sample, regularly obtained, is submitted   with relevant clinical findings and history, and when   the Pap result is evaluated along with historic and   current clinical information.         HPV mRNA E6/E7 Not Detected Not Detected      Comment:      Methodology: Transcription-Mediated Amplification     This assay detects E6/E7 viral messenger RNA (mRNA) from 14  high-risk HPV types (16,18,31,33,35,39,45,51,52,56,58,59,66,68).        Cervical sources are required for HPV testing.  If a vaginal source from a patient who has had a  total hysterectomy with removal of cervix was   submitted, please contact the  testing laboratory  for alternative testing options.     For additional information, please refer to  http://education.[email or web address]/faq/QHY307q3  (This link if provided for information/  educational purposes only.)     TSH WITH FREE T4 REFLEX ([facility])   Result Value Ref Range    TSH 4.28 0.40 - 4.50 mIU/L   Lipid Panel ([facility])   Result Value Ref Range    Cholesterol 144 0 - 199 mg/dL    Triglycerides 64 0 - 149 mg/dL    HDL Cholesterol 62 40 - 150 mg/dL    LDL Cholesterol Direct 69 0 - 130 mg/dL    Cholesterol/HDL Ratio 2 0 - 5   Comprehensive Metobolic Panel ([facility])   Result Value Ref Range    Carbon Dioxide 29.2 20 - 32 mmol/L    Creatinine 0.90 0.60 - 1.30 mg/dL    Glucose 89 60 - 99 mg/dL    Sodium 140.4 135 - 146 mmol/L    Potassium 4.38 3.5 - 5.3 mmol/L    Chloride 104.2 98 - 110 mmol/L    Protein Total 6.9 6.1 - 8.1 g/dL    Albumin 4.4 3.6 - 5.1 g/dL    Alkaline Phosphatase 44 33 - 130 U/L    ALT 33 (A) 0 - 32 U/L    AST 25 0 - 35 U/L    Bilirubin Total 0.7 0.2 - 1.2 mg/dL    Urea Nitrogen 15 7 - 25 mg/dL    Calcium 9.6 8.6 - 10.3 mg/dL    BUN/Creatinine Ratio 16.7 6 - 32    Globulin Calculated 2.5 1.9 - 3.7    A/G Ratio 1.8 1 - 2.5       If you have any questions or concerns, please call the clinic at the number listed above.       Sincerely,      Donna Rivas MD

## 2023-07-15 LAB — TSH SERPL-ACNC: 4.28 MIU/L (ref 0.4–4.5)

## 2023-07-18 LAB
CLINICAL HISTORY - QUEST: NORMAL
COMMENT - QUEST: NORMAL
CYTOTECHNOLOGIST - QUEST: NORMAL
DESCRIPTIVE DIAGNOSIS - QUEST: NORMAL
HPV MRNA E6/E7: NOT DETECTED
LAST PAP DX - QUEST: NORMAL
LMP - QUEST: NORMAL
PREV BX DX - QUEST: NORMAL
SOURCE: NORMAL
STATEMENT OF ADEQUACY - QUEST: NORMAL

## 2023-07-26 ENCOUNTER — TELEPHONE (OUTPATIENT)
Dept: OTHER | Facility: CLINIC | Age: 63
End: 2023-07-26

## 2023-07-26 ENCOUNTER — VIRTUAL VISIT (OUTPATIENT)
Dept: OTHER | Facility: CLINIC | Age: 63
End: 2023-07-26
Attending: INTERNAL MEDICINE
Payer: COMMERCIAL

## 2023-07-26 DIAGNOSIS — E78.5 HYPERLIPIDEMIA LDL GOAL <70: ICD-10-CM

## 2023-07-26 DIAGNOSIS — I77.810 ASCENDING AORTA DILATION (H): Primary | ICD-10-CM

## 2023-07-26 DIAGNOSIS — I10 BENIGN ESSENTIAL HYPERTENSION: ICD-10-CM

## 2023-07-26 DIAGNOSIS — F17.218 CIGARETTE NICOTINE DEPENDENCE WITH OTHER NICOTINE-INDUCED DISORDER: ICD-10-CM

## 2023-07-26 DIAGNOSIS — R00.2 PALPITATIONS: ICD-10-CM

## 2023-07-26 PROCEDURE — 99214 OFFICE O/P EST MOD 30 MIN: CPT | Mod: 93 | Performed by: INTERNAL MEDICINE

## 2023-07-26 RX ORDER — ROSUVASTATIN CALCIUM 10 MG/1
10 TABLET, COATED ORAL EVERY EVENING
Qty: 90 TABLET | Refills: 3 | Status: SHIPPED | OUTPATIENT
Start: 2023-07-26 | End: 2024-07-26

## 2023-07-26 NOTE — PATIENT INSTRUCTIONS
Please go for Zio patch heart monitoring, our staff will call and schedule the test    Congratulations for quitting smoking and maintain the same    Decrease or avoid coffee intake    Continue current medications and monitor blood pressure and pulse at home    Crestor refcaridad    Virtual visit 2 weeks after returning the heart monitor test

## 2023-07-26 NOTE — TELEPHONE ENCOUNTER
Follow up to 07/26/23    10 day Ambulatory Cardiac Monitor (Ziopatch)  In clinic visit 2 weeks after Ziopatch is complete.

## 2023-07-26 NOTE — PROGRESS NOTES
"Patricia is a 62 year old who is being evaluated via a billable telephone visit.      What phone number would you like to be contacted at? [phone]   How would you like to obtain your AVS? Mail a copy    Marion Marvin MA    Provider visit note:    Chief complaint:    Follow-up visit  Review of recent echocardiogram and laboratory tests  She has known history of ascending aorta dilatation 4.4 cm initially noted lung cancer screening CT in December 2022  She recently quit smoking  Experiencing intermittent palpitations    History of present illness:  For full details please see my initial consult note    This is a very pleasant 62-year-old female with history of hypertension well-controlled with medications, hyperlipidemia recently initiated Crestor 10 mg daily tolerating and also history of hypothyroidism on replacement recent thyroid function tests are normal incidentally found ascending aorta dilatation 4.4 cm on lung cancer screening CT in December 2022 subsequently evaluated by me and adjusted the medications now she quit smoking blood pressure is good range but having intermittent palpitations.  She drinks 2 cups of coffee daily and socially drinks alcohol.  Not taking any antihistamines  Recent thyroid function tests are normal  Recent lipid panel looks good  No electrolyte abnormalities    Review of systems: Reviewed all 12 point review of systems as per HPI otherwise unremarkable    Physical exam:( no physical exam done this is virtual visit)    Reviewed recent laboratory tests, imaging studies in the epic and updated chart    ECHO 2/2023  \"Interpretation Summary     Left ventricular systolic function is normal.  The visual ejection fraction is 65-70%.  No regional wall motion abnormalities noted.  The ascending aorta is Mildly dilated.(4.2cm)  The study was technically difficult. There is no comparison study available.\"    Assessment and plan:    1. Ascending aorta dilation (H) 4.4 cm on lung cancer " screening CT 12/30/2022  2. Benign essential hypertension  3. Cigarette nicotine dependence with other nicotine-induced disorder  4. Hyperlipidemia LDL goal <70        This is a very pleasant 62-year-old female incidental finding of ascending aorta dilatation 4.4 cm on lung cancer screening CT and she is a smoker smokes 1/3 pack cigarettes daily for 2 decades quit smoking recently .  She is normotensive but experiencing intermittent palpitations   CT scan also showed some atherosclerosis of coronary arteries  She has a family history of heart disease in both parents does not know full details     I reviewed CT scan with the patient, reviewed recent echocardiogram and recent labs with the patient  Suggested aggressive control of the risk factors specifically,  Congratulated for quitting smoking, maintain the same  Given mild atherosclerosis of coronary arteries, postmenopausal, ascending aorta dilatation will initiated Crestor 10 mg daily and continue the same, 90-day supply with 3 refills sent    Recent thyroid labs looks good given intermittent palpitations will arrange Zio patch 7 to 10 days order placed  Decrease caffeine intake and alcohol intake      Plan for repeat CTA of chest abdomen pelvis in 1 year  12/2023 then followed by office or virtual visit     Phone visit start time: 9 AM  Location of the patient; at her home  Location of the provider: [facility]      30 minutes spent on the date of the encounter doing chart review, history and exam, documentation, and further activities as noted above. .  AVS with written instructions done  She had a lot of questions and all of them were answered        This visit is being conducted as a virtual visit due to the emphasis on mitigation of the COVID-19 virus pandemic. The clinician has decided that the risk of an in-office visit outweighs the benefit for this patient.       Donald Nathan MD, FASABRA, FSVM, FNLA, FACP  Vascular  medicine  Clinical hypertension specialist  Clinical lipidologist

## 2023-07-28 NOTE — TELEPHONE ENCOUNTER
Future Appointments   Date Time Provider Department Center   8/7/2023  9:00 AM [facility] DEVICE RM [facility]   8/31/2023  8:30 AM Donald Nathan MD [facility]

## 2023-07-28 NOTE — TELEPHONE ENCOUNTER
Pt is wondering if her follow up with the provider can be virtual. She will be taking care of her elderly mother for a few weeks. Her mother is having surgery around the time the ziopatch is done and patient wont be able to make it in due to circumstances. Could it be virtual?   Tremfya Counseling: I discussed with the patient the risks of guselkumab including but not limited to immunosuppression, serious infections, and drug reactions.  The patient understands that monitoring is required including a PPD at baseline and must alert us or the primary physician if symptoms of infection or other concerning signs are noted.

## 2023-08-07 ENCOUNTER — HOSPITAL ENCOUNTER (OUTPATIENT)
Dept: CARDIOLOGY | Facility: CLINIC | Age: 63
Discharge: HOME | End: 2023-08-07
Attending: INTERNAL MEDICINE | Admitting: INTERNAL MEDICINE
Payer: COMMERCIAL

## 2023-08-07 DIAGNOSIS — R00.2 PALPITATIONS: ICD-10-CM

## 2023-08-07 PROCEDURE — 93248 EXT ECG>7D<15D REV&INTERPJ: CPT | Performed by: INTERNAL MEDICINE

## 2023-08-07 PROCEDURE — 93246 EXT ECG>7D<15D RECORDING: CPT

## 2023-09-06 ENCOUNTER — VIRTUAL VISIT (OUTPATIENT)
Dept: OTHER | Facility: CLINIC | Age: 63
End: 2023-09-06
Attending: INTERNAL MEDICINE
Payer: COMMERCIAL

## 2023-09-06 DIAGNOSIS — I77.810 ASCENDING AORTA DILATION (H): ICD-10-CM

## 2023-09-06 DIAGNOSIS — R00.2 PALPITATIONS: Primary | ICD-10-CM

## 2023-09-06 DIAGNOSIS — Z87.891 FORMER SMOKER: ICD-10-CM

## 2023-09-06 DIAGNOSIS — E78.5 HYPERLIPIDEMIA LDL GOAL <70: ICD-10-CM

## 2023-09-06 DIAGNOSIS — I10 BENIGN ESSENTIAL HYPERTENSION: ICD-10-CM

## 2023-09-06 PROCEDURE — 99214 OFFICE O/P EST MOD 30 MIN: CPT | Mod: 93 | Performed by: INTERNAL MEDICINE

## 2023-09-06 NOTE — PROGRESS NOTES
Patricia is a 62 year old who is being evaluated via a billable telephone visit.      What phone number would you like to be contacted at? [phone]  How would you like to obtain your AVS? Gabriella    Distant Location (provider location):  On-site      Objective       Vitals:  No vitals were obtained today due to virtual visit.    FACUNDO SALCEDO    Provider visit note:    Patricia is a 62 year old who is being evaluated via a billable telephone visit.      What phone number would you like to be contacted at? [phone]   How would you like to obtain your AVS? Mail a copy    Marion Marvin MA    Provider visit note:    Chief complaint:    Follow-up visit  Review of recent echocardiogram and laboratory tests  She has known history of ascending aorta dilatation 4.4 cm initially noted lung cancer screening CT in December 2022  She recently quit smoking  Experiencing intermittent palpitations  Review of Zio patch results     History of present illness:  For full details please see my initial consult note and follow up notes     This is a very pleasant 62-year-old female with history of hypertension well-controlled with medications, hyperlipidemia recently initiated Crestor 10 mg daily tolerating and also history of hypothyroidism on replacement recent thyroid function tests are normal incidentally found ascending aorta dilatation 4.4 cm on lung cancer screening CT in December 2022 subsequently evaluated by me and adjusted the medications now she quit smoking blood pressure is good range but having intermittent palpitations.  She drinks 2 cups of coffee daily and socially drinks alcohol.  Not taking any antihistamines  Recent thyroid function tests are normal  Recent lipid panel looks good  No electrolyte abnormalities    Reviewed Zio patch results few episodes of SVT 6-17 beats  Her palpitation symptoms are very infrequent now      Review of systems: Reviewed all 12 point review of systems as per HPI otherwise  "unremarkable    Physical exam:( no physical exam done this is virtual visit)    Reviewed recent laboratory tests, imaging studies in the epic and updated chart    ECHO 2/2023  \"Interpretation Summary     Left ventricular systolic function is normal.  The visual ejection fraction is 65-70%.  No regional wall motion abnormalities noted.  The ascending aorta is Mildly dilated.(4.2cm)  The study was technically difficult. There is no comparison study available.\"    Assessment and plan:    1. Ascending aorta dilation (H) 4.4 cm on lung cancer screening CT 12/30/2022  2. Benign essential hypertension  3. Cigarette nicotine dependence with other nicotine-induced disorder  4. Hyperlipidemia LDL goal <70   5. Palpitation with SVT 6-17 beats ( 23 episodes on recent Ziopatch )      This is a very pleasant 62-year-old female incidental finding of ascending aorta dilatation 4.4 cm on lung cancer screening CT and she is a smoker smokes 1/3 pack cigarettes daily for 2 decades quit smoking recently .  She is normotensive but experiencing intermittent palpitations   CT scan also showed some atherosclerosis of coronary arteries  She has a family history of heart disease in both parents does not know full details     I reviewed CT scan with the patient, reviewed recent echocardiogram and recent labs with the patient  Suggested aggressive control of the risk factors specifically,  Congratulated for quitting smoking, maintain the same  Given mild atherosclerosis of coronary arteries, postmenopausal, ascending aorta dilatation will initiated Crestor 10 mg daily and continue the same, 90-day supply with 3 refills sent    Recent thyroid labs looks good given intermittent palpitations will arrange Zio patch 7 to 10 days order placed  Decrease caffeine intake and alcohol intake      Plan for repeat CTA of chest abdomen pelvis in 6 months CTA then followed by office or virtual visit     Phone visit start time: 11:00 am  Phone Visit end time : " 11:15 AM  Location of the patient; at her home  Location of the provider: [facility]      30 minutes spent on the date of the encounter doing chart review, history and exam, documentation, and further activities as noted above. .  AVS with written instructions done  She had a lot of questions and all of them were answered        This visit is being conducted as a virtual visit due to the emphasis on mitigation of the COVID-19 virus pandemic. The clinician has decided that the risk of an in-office visit outweighs the benefit for this patient.       Donald Nathan MD, FAHA, FSVM, FNLA, FACP  Vascular medicine  Clinical hypertension specialist  Clinical lipidologist

## 2023-09-06 NOTE — PATIENT INSTRUCTIONS
Decrease coffee intake     Recent 10-day Zio patch revealed few episodes of SVT beats ranging 6-17     Since you have infrequent symptoms and you already quit smoking, drink plenty of water and continue current medications    If worsening palpitations call your primary or our office to schedule to see EP cardiologist for further evaluation    Follow-up in 6 months either office or virtual visit after CTA of chest abdomen pelvis
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
## 2023-10-31 ENCOUNTER — ALLIED HEALTH/NURSE VISIT (OUTPATIENT)
Dept: FAMILY MEDICINE | Facility: CLINIC | Age: 63
End: 2023-10-31

## 2023-10-31 DIAGNOSIS — Z23 NEED FOR VACCINATION: Primary | ICD-10-CM

## 2023-10-31 PROCEDURE — 90471 IMMUNIZATION ADMIN: CPT | Performed by: FAMILY MEDICINE

## 2023-10-31 PROCEDURE — 90662 IIV NO PRSV INCREASED AG IM: CPT | Performed by: FAMILY MEDICINE

## 2023-12-22 ENCOUNTER — TELEPHONE (OUTPATIENT)
Dept: OTHER | Facility: CLINIC | Age: 63
End: 2023-12-22
Payer: COMMERCIAL

## 2023-12-22 NOTE — TELEPHONE ENCOUNTER
[facility]    Who is the name of the provider?:  GAUTAM QUACH   What is the location you see this provider at/preferred location?: [place]  Person calling / Facility: Patricia Ramirez  Phone number:  [phone] (home)  Nurse call back needed:  Yes       Reason for call:  Patricia needs to have a Lung CT and wants to know if she could combine the lung and the CTA scheduled 12/29/23 or if the CTA covers the lungs as well.    Pharmacy location:     Outside Imaging: n/a   Can we leave a detailed message on this number?  YES     12/22/2023, 9:44 AM

## 2023-12-22 NOTE — TELEPHONE ENCOUNTER
Discussed with patient that CTA chest/abdomen/pelvis scheduled 12/29 should be sufficient to also cover for lung screen CT.

## 2023-12-29 ENCOUNTER — HOSPITAL ENCOUNTER (OUTPATIENT)
Dept: CT IMAGING | Facility: CLINIC | Age: 63
Discharge: HOME | End: 2023-12-29
Attending: INTERNAL MEDICINE | Admitting: INTERNAL MEDICINE
Payer: COMMERCIAL

## 2023-12-29 DIAGNOSIS — I77.810 ASCENDING AORTA DILATION (H): ICD-10-CM

## 2023-12-29 PROCEDURE — 250N000009 HC RX 250: Performed by: INTERNAL MEDICINE

## 2023-12-29 PROCEDURE — 250N000011 HC RX IP 250 OP 636: Performed by: INTERNAL MEDICINE

## 2023-12-29 PROCEDURE — 74174 CTA ABD&PLVS W/CONTRAST: CPT

## 2023-12-29 RX ORDER — IOPAMIDOL 755 MG/ML
500 INJECTION, SOLUTION INTRAVASCULAR ONCE
Status: COMPLETED | OUTPATIENT
Start: 2023-12-29 | End: 2023-12-29

## 2023-12-29 RX ADMIN — IOPAMIDOL 72 ML: 755 INJECTION, SOLUTION INTRAVENOUS at 13:28

## 2023-12-29 RX ADMIN — SODIUM CHLORIDE 80 ML: 9 INJECTION, SOLUTION INTRAVENOUS at 13:29

## 2024-01-02 ENCOUNTER — TELEPHONE (OUTPATIENT)
Dept: OTHER | Facility: CLINIC | Age: 64
End: 2024-01-02
Payer: COMMERCIAL

## 2024-01-02 NOTE — TELEPHONE ENCOUNTER
Spoke with patient and scheduled a telephone visit.    Future Appointments   Date Time Provider Department Center   1/3/2024  3:00 PM Donald Nathan MD [facility]

## 2024-01-02 NOTE — TELEPHONE ENCOUNTER
----- Message from Donald Nathan MD sent at 12/29/2023  5:39 PM CST -----  Virtual visit next available       Please arrange for virtual visit at next available to discuss results of CTA chest/abdomen/pelvis that was done on 12/29/23.

## 2024-01-02 NOTE — TELEPHONE ENCOUNTER
Left voicemail with instructions for patient to call back to schedule their appointment(s)    January 2, 2024 , 9:53 AM

## 2024-01-03 ENCOUNTER — VIRTUAL VISIT (OUTPATIENT)
Dept: OTHER | Facility: CLINIC | Age: 64
End: 2024-01-03
Attending: INTERNAL MEDICINE
Payer: COMMERCIAL

## 2024-01-03 DIAGNOSIS — I77.810 ASCENDING AORTA DILATION (H): ICD-10-CM

## 2024-01-03 DIAGNOSIS — E78.5 HYPERLIPIDEMIA LDL GOAL <70: Primary | ICD-10-CM

## 2024-01-03 DIAGNOSIS — I72.8 SPLENIC ARTERY ANEURYSM (H): ICD-10-CM

## 2024-01-03 DIAGNOSIS — I10 BENIGN ESSENTIAL HYPERTENSION: ICD-10-CM

## 2024-01-03 DIAGNOSIS — R00.2 PALPITATIONS: ICD-10-CM

## 2024-01-03 PROCEDURE — 99214 OFFICE O/P EST MOD 30 MIN: CPT | Mod: 93 | Performed by: INTERNAL MEDICINE

## 2024-01-03 NOTE — PATIENT INSTRUCTIONS
Congratulations for quitting smoking maintain the same    Unchanged ascending aorta dilatation    Continue current medications    As we discussed very small splenic artery aneurysms and the calcified around 7 mm or 8 mm in size    Please go for fasting lipids in 6 months then followed by virtual or office visit    Plan for repeat CTA of chest abdomen pelvis in 1 year

## 2024-01-03 NOTE — PROGRESS NOTES
Patricia is a 63 year old who is being evaluated via a billable telephone visit.      What phone number would you like to be contacted at? [phone]  [facility]    Distant Location (provider location):  On-site      Objective         Vitals:  No vitals were obtained today due to virtual visit.    FACUNDO Gomez is a 62 year old who is being evaluated via a billable telephone visit.      What phone number would you like to be contacted at? [phone]  How would you like to obtain your AVS? MyChart    Distant Location (provider location):  On-site      Objective       Vitals:  No vitals were obtained today due to virtual visit.    FACUNDO SALCEDO    Provider visit note:    Patricia is a 62 year old who is being evaluated via a billable telephone visit.      What phone number would you like to be contacted at? [phone]   How would you like to obtain your AVS? Mail a copy    Marion Marvin MA    Provider visit note:    Chief complaint:    Follow-up visit  Review of recent CT echocardiogram and laboratory tests  She has known history of ascending aorta dilatation 4.4 cm initially noted lung cancer screening CT in December 2022, unchanged now  She quit smoking  Experiencing intermittent palpitations  Reviewed  Zio patch results     History of present illness:  For full details please see my initial consult note and follow up notes     This is a very pleasant 63-year-old female with history of hypertension well-controlled with medications, hyperlipidemia recently initiated Crestor 10 mg daily tolerating and also history of hypothyroidism on replacement recent thyroid function tests are normal incidentally found ascending aorta dilatation 4.4 cm on lung cancer screening CT in December 2022 subsequently evaluated by me and adjusted the medications now she quit smoking blood pressure is good range but having intermittent palpitations.  She drinks 2 cups of coffee daily and socially drinks  "alcohol.  Not taking any antihistamines  Recent thyroid function tests are normal  Recent lipid panel looks good  No electrolyte abnormalities    Reviewed Zio patch results few episodes of SVT 6-17 beats  Her palpitation symptoms are very infrequent now  Reviewed CTA     Review of systems: Reviewed all 12 point review of systems as per HPI otherwise unremarkable    Physical exam:( no physical exam done this is virtual visit)    Reviewed recent laboratory tests, imaging studies in the epic and updated chart    ECHO 2/2023  \"Interpretation Summary     Left ventricular systolic function is normal.  The visual ejection fraction is 65-70%.  No regional wall motion abnormalities noted.  The ascending aorta is Mildly dilated.(4.2cm)  The study was technically difficult. There is no comparison study available.\"    CTA CHEST ABDOMEN PELVIS WITH CONTRAST   12/29/2023 1:44 PM      HISTORY: Ascending aorta dilation (H24).     TECHNIQUE: CT angiogram of the chest, abdomen and pelvis was performed  following the administration of 72 cc intravenous contrast. Images are  viewed in multiple planes and 3-D reconstructions were also performed.  Radiation dose for this scan was reduced using automated exposure  control, adjustment of the mA and/or kV according to patient size, or  iterative reconstruction technique.     COMPARISON: CT of the chest dated 12/30/2022     FINDINGS:    Vascular exam:  Thoracic aorta: The thoracic aorta at the sinuses of Valsalva has a  maximum diameter of approximately 3.6 cm. The maximum diameter of the  ascending thoracic aorta is approximately 4.2 cm. The thoracic aorta  at the level of the arch and along its descending portions is of  normal caliber. The great vessels arising from the thoracic aortic  arch are patent without significant stenoses. Incidental note is made  of a direct origin of the left vertebral artery from the thoracic  aortic arch. This is a normal variant.     Abdominal aorta: The " abdominal aorta is of normal caliber. The  visceral branches of the abdominal aorta are patent without  significant stenoses. There is a partially calcified 7 mm aneurysm  arising from the mid distal splenic artery and a partially calcified 8  mm aneurysm arising from the distal splenic artery.     The iliac arteries and proximal femoral arteries are patent without  significant stenoses.     Soft tissue exam:     Chest: There is no pathologic mediastinal, hilar, or axillary  lymphadenopathy. Mild coronary artery calcifications.     Abdomen/pelvis: There is a small subcentimeter cyst in the left  kidney. The remaining solid organs in the abdomen are unremarkable.     There is a small periumbilical hernia containing mesenteric fat. The  bowel appears grossly unremarkable. There is a small hiatal hernia.  There is no pathologic abdominal or pelvic lymphadenopathy.                                                                      IMPRESSION:  1. 4.2 cm ascending thoracic aortic aneurysm.  2. Small partially calcified aneurysms arising from the splenic artery  as above.      NICOLE SCHULER MD      Assessment and plan:    1. Ascending Aorta dilation (H) 4.4 cm on lung cancer screening CT 12/30/2022, unchanged on CTA 12/29/23   2. Benign essential hypertension  3. Cigarette nicotine dependence with other nicotine-induced disorder  4. Hyperlipidemia LDL goal <70  5. Palpitation with SVT 6-17 beats ( 23 episodes on recent Ziopatch )   6. calcified small splenic aneurysms 7 and 8 mm size     This is a very pleasant 62-year-old female incidental finding of ascending aorta dilatation 4.4 cm on lung cancer screening CT and she is a smoker smokes 1/3 pack cigarettes daily for 2 decades quit smoking recently .  She is normotensive but experiencing intermittent palpitations   CT scan also showed some atherosclerosis of coronary arteries  She has a family history of heart disease in both parents does not know full details     I  reviewed CT scan with the patient, reviewed recent echocardiogram and recent labs with the patient  Suggested aggressive control of the risk factors specifically,  Congratulated for quitting smoking, maintain the same  Given mild atherosclerosis of coronary arteries, postmenopausal, ascending aorta dilatation will initiated Crestor 10 mg daily and continue the same, 90-day supply with 3 refills sent    Recent thyroid labs looks good given intermittent palpitations will arrange Zio patch 7 to 10 days order placed  Decrease caffeine intake and alcohol intake      Plan for repeat CTA of chest abdomen pelvis in 6 months CTA then followed by office or virtual visit     Phone visit start time: 3:15 PM  Phone Visit end time : 3:40 PM  Location of the patient; at her home  Location of the provider: [facility]      30 minutes spent on the date of the encounter doing chart review, history , documentation, and further activities as noted above. .  AVS with written instructions done  She had a lot of questions and all of them were answered        This visit is being conducted as a virtual visit due to the emphasis on mitigation of the COVID-19 virus pandemic. The clinician has decided that the risk of an in-office visit outweighs the benefit for this patient.       Donald Nathan MD, FASABRA, FSVM, FNLA, FACP  Vascular medicine  Clinical hypertension specialist  Clinical lipidologist

## 2024-01-10 ENCOUNTER — TELEPHONE (OUTPATIENT)
Dept: UROLOGY | Facility: CLINIC | Age: 64
End: 2024-01-10
Payer: COMMERCIAL

## 2024-01-10 DIAGNOSIS — R39.15 URINARY URGENCY: ICD-10-CM

## 2024-01-10 RX ORDER — TROSPIUM CHLORIDE 20 MG/1
20 TABLET, FILM COATED ORAL
Qty: 180 TABLET | Refills: 1 | Status: SHIPPED | OUTPATIENT
Start: 2024-01-10 | End: 2024-07-17

## 2024-01-10 NOTE — TELEPHONE ENCOUNTER
[facility] Call Center    Phone Message    May a detailed message be left on voicemail: yes     Reason for Call: Medication Refill Request    Has the patient contacted the pharmacy for the refill? Yes   Name of medication being requested: trospium (SANCTURA) 20 MG tablet  Provider who prescribed the medication: Abi Ambriz PA-C  Pharmacy: [facility] [id number], [place] [address]  Date medication is needed: asap- [facility], but [facility] too expensive, please send the remainder of her rx to [facility], and please call pt once complete, thank you!       Action Taken: Message routed to:  Other: uro    Travel Screening: Not Applicable

## 2024-01-24 ENCOUNTER — HOSPITAL ENCOUNTER (OUTPATIENT)
Dept: MAMMOGRAPHY | Facility: CLINIC | Age: 64
Discharge: HOME | End: 2024-01-24
Attending: FAMILY MEDICINE | Admitting: FAMILY MEDICINE
Payer: COMMERCIAL

## 2024-01-24 DIAGNOSIS — Z12.31 VISIT FOR SCREENING MAMMOGRAM: ICD-10-CM

## 2024-01-24 PROCEDURE — 77063 BREAST TOMOSYNTHESIS BI: CPT

## 2024-07-16 ENCOUNTER — OFFICE VISIT (OUTPATIENT)
Dept: FAMILY MEDICINE | Facility: CLINIC | Age: 64
End: 2024-07-16

## 2024-07-16 VITALS
OXYGEN SATURATION: 98 % | TEMPERATURE: 97.3 F | BODY MASS INDEX: 26.37 KG/M2 | WEIGHT: 174 LBS | HEART RATE: 68 BPM | RESPIRATION RATE: 20 BRPM | DIASTOLIC BLOOD PRESSURE: 80 MMHG | HEIGHT: 68 IN | SYSTOLIC BLOOD PRESSURE: 112 MMHG

## 2024-07-16 DIAGNOSIS — R39.15 URINARY URGENCY: ICD-10-CM

## 2024-07-16 DIAGNOSIS — I10 BENIGN ESSENTIAL HYPERTENSION: ICD-10-CM

## 2024-07-16 DIAGNOSIS — E03.9 ACQUIRED HYPOTHYROIDISM: ICD-10-CM

## 2024-07-16 DIAGNOSIS — I77.819 AORTIC DILATATION (H): ICD-10-CM

## 2024-07-16 DIAGNOSIS — Z00.00 WELL WOMAN EXAM WITHOUT GYNECOLOGICAL EXAM: Primary | ICD-10-CM

## 2024-07-16 DIAGNOSIS — E78.5 HYPERLIPIDEMIA LDL GOAL <70: ICD-10-CM

## 2024-07-16 DIAGNOSIS — Z13.820 SCREENING FOR OSTEOPOROSIS: ICD-10-CM

## 2024-07-16 LAB
ALBUMIN SERPL-MCNC: 4.4 G/DL (ref 3.6–5.1)
ALP SERPL-CCNC: 50 U/L (ref 33–130)
ALT 1742-6: 12 U/L (ref 0–32)
AST 1920-8: 17 U/L (ref 0–35)
BILIRUB SERPL-MCNC: 0.7 MG/DL (ref 0.2–1.2)
BUN SERPL-MCNC: 16 MG/DL (ref 7–25)
BUN/CREATININE RATIO: 17 (ref 6–32)
CALCIUM SERPL-MCNC: 10.3 MG/DL (ref 8.6–10.3)
CHLORIDE SERPLBLD-SCNC: 104.9 MMOL/L (ref 98–110)
CHOLEST SERPL-MCNC: 131 MG/DL (ref 0–199)
CHOLEST/HDLC SERPL: 2 {RATIO} (ref 0–5)
CO2 SERPL-SCNC: 27.6 MMOL/L (ref 20–32)
CREAT SERPL-MCNC: 0.95 MG/DL (ref 0.6–1.3)
GLUCOSE SERPL-MCNC: 92 MG/DL (ref 60–99)
HDLC SERPL-MCNC: 59 MG/DL (ref 40–150)
LDLC SERPL CALC-MCNC: 62 MG/DL
POTASSIUM SERPL-SCNC: 4.55 MMOL/L (ref 3.5–5.3)
PROT SERPL-MCNC: 6.7 G/DL (ref 6.1–8.1)
SODIUM SERPL-SCNC: 140.2 MMOL/L (ref 135–146)
TRIGL SERPL-MCNC: 50 MG/DL (ref 0–149)

## 2024-07-16 PROCEDURE — 99397 PER PM REEVAL EST PAT 65+ YR: CPT

## 2024-07-16 PROCEDURE — 80061 LIPID PANEL: CPT

## 2024-07-16 PROCEDURE — 36415 COLL VENOUS BLD VENIPUNCTURE: CPT

## 2024-07-16 PROCEDURE — 80053 COMPREHEN METABOLIC PANEL: CPT

## 2024-07-16 RX ORDER — LEVOTHYROXINE SODIUM 50 UG/1
50 TABLET ORAL DAILY
Qty: 90 TABLET | Refills: 3 | Status: SHIPPED | OUTPATIENT
Start: 2024-07-16 | End: 2024-07-18 | Stop reason: DRUGHIGH

## 2024-07-16 NOTE — NURSING NOTE
Patricia Ramirez is here for a CPX.    Pre-visit planning  Immunizations -up to date  Colonoscopy -is up to date  Mammogram -is up to date  Asthma test --  PHQ9 -  ANNA 7 -      Questioned patient about current smoking habits.  Pt. quit smoking some time ago.  Body mass index is 26.46 kg/m .  PULSE regular  My Chart:   CLASSIFICATION OF OVERWEIGHT AND OBESITY BY BMI                        Obesity Class           BMI(kg/m2)  Underweight                                    < 18.5  Normal                                         18.5-24.9  Overweight                                     25.0-29.9  OBESITY                     I                  30.0-34.9                             II                 35.0-39.9  EXTREME OBESITY             III                >40                            Patient's  BMI Body mass index is 26.46 kg/m .      The patient has verbalized that it is ok to leave a detailed voice message on the patient's cell phone with results/recommendations from this visit.

## 2024-07-16 NOTE — LETTER
July 18, 2024      Patricia Ramirez  [address] [facility] [id number]        Dear ,    We are writing to inform you of your test results.    Test results indicate you may require additional follow up, see comment below.    Your thyroid function is slightly low. Your TSH level is slightly elevated which is a sign that your thyroid function is on the lower end of normal. Your T4 (actual thyroid hormone) is still in the normal range of 1.1 however is in the lower end of normal so I recommend increasing the dose of your Levothyroxine from 50 mcg to 75 mcg. I have sent in this new prescription to the [facility] pharmacy in [place]. Please take this dose for the next three months first thing in the morning on an empty stomach before all of your other medications. In October, then please schedule a lab only visit here and we will recheck your thyroid labs and if they are normal I will send you refills for one year. Please let me know if you have any questions on this.     Your CMP results which looks at your electrolytes, glucose levels, kidney function, and liver function are all in the normal ranges.     Your cholesterol levels are also all in the normal ranges, which is great. Continue to focus on healthy eating habits and daily exercise.     Please let me know if you have any questions or concerns.     Resulted Orders   Comprehensive Metobolic Panel ([facility])   Result Value Ref Range    Carbon Dioxide 27.6 20 - 32 mmol/L    Creatinine 0.95 0.60 - 1.30 mg/dL    Glucose 92 60 - 99 mg/dL    Sodium 140.2 135 - 146 mmol/L    Potassium 4.55 3.5 - 5.3 mmol/L    Chloride 104.9 98 - 110 mmol/L    Protein Total 6.7 6.1 - 8.1 g/dL    Albumin 4.4 3.6 - 5.1 g/dL    Alkaline Phosphatase 50 33 - 130 U/L    ALT 12 0 - 32 U/L    AST 17 0 - 35 U/L    Bilirubin Total 0.7 0.2 - 1.2 mg/dL    Urea Nitrogen 16 7 - 25 mg/dL    Calcium 10.3 8.6 - 10.3 mg/dL    BUN/Creatinine Ratio 17 6 - 32   Lipid Panel ([facility])   Result Value Ref  Range    Cholesterol 131 0 - 199 mg/dL    Triglycerides 50 0 - 149 mg/dL    HDL Cholesterol 59 40 - 150 mg/dL    LDL-C 62 mg/dL    Cholesterol/HDL Ratio 2 0 - 5   TSH WITH FREE T4 REFLEX ([facility])   Result Value Ref Range    TSH 4.58 (H) 0.40 - 4.50 mIU/L    T4 Free, Non-Dialysis 1.1 0.8 - 1.8 ng/dL       If you have any questions or concerns, please call the clinic at the number listed above.       Sincerely,      Barbara Butt PA-C

## 2024-07-16 NOTE — PROGRESS NOTES
Preventive Care Visit  [facility]  Barbara Butt PA-C, Family Medicine  Jul 16, 2024       SUBJECTIVE:   Patricia is a 63 year old, presenting for the following:  Physical      HPI    Patricia presents for her yearly physical.     Daily medications: Reviewed.     -Hypothyroidism: Currently takes Levothyroxine 50 mcg every morning on an empty stomach. Denies any side effects of medication or any hypo/hyperthyroid symptoms. Last TSH check was 07/2023 and was normal.   -Hypertension/Aortic dilation/Hyperlipidemia: Follows with vascular (Dr. Nathan) every year, currently taking Rosuvastatin 10 mg daily.   -Urinary urgency: Currently on Sanctura 20 mg BID, follows with urology.     Concerns: None.     Past medical history and daily medications reviewed. Reviewed past medical history, surgical history, family history, and social history below.     Social history:  -Diet: Tries to eat a well balanced diet, primarily has chicken and red meat for protein, good amount of fruits and vegetables, cheese and milk for calcium.   -Water: Drinks a good amount of water throughout the day, does have some coffee and pop.   -Exercise: Active, enjoys walking, biking, mows the yard, gardening, also very active with her kids.   -Sleep: Has slightly worsened since she has retired, trouble staying asleep because she frequently wakes up in the middle of the night, no trouble falling asleep, denies sleep apnea symptoms.   -Daily vitamins: Multivitamin, glucosamine.   -Dentist: Twice a year.   -Eye doctor: Annually.   -Smoking: Socially per patient.   -Alcohol: Socially per patient.   -LMP: Postmenopausal.      Screenings:  -Immunizations: Due for pneumonia immunization, declines. Encouraged COVID booster and RSV from local pharmacy.   -Lab work: CMP, lipid, TSH/T4.   -Pap smear: N/A.   -Mammogram: Up to date, due 12/2024.   -Colon cancer screening: Up to date, due 07/2027.   -DEXA scan: Due, order placed.     Social  History     Tobacco Use    Smoking status: Former     Passive exposure: Past    Smokeless tobacco: Never   Substance Use Topics    Alcohol use: Yes     Alcohol/week: 1.0 standard drink of alcohol     Types: 1 Standard drinks or equivalent per week     Comment: socially     Reviewed orders with patient.  Reviewed health maintenance and updated orders accordingly - Yes    Lab work is in process.     Breast Cancer Screening: Any new diagnosis of family breast, ovarian, or bowel cancer? No     FHS-7:       1/21/2022     9:18 AM 1/23/2023    11:00 AM 1/24/2024    11:35 AM   Breast CA Risk Assessment (FHS-7)   Did any of your first-degree relatives have breast or ovarian cancer? Yes Yes Yes   Did any of your relatives have bilateral breast cancer? No No No   Did any man in your family have breast cancer? No No No   Did any woman in your family have breast and ovarian cancer? No No No   Did any woman in your family have breast cancer before age 50 y? No No No   Do you have 2 or more relatives with breast and/or ovarian cancer? Yes Yes Yes   Do you have 2 or more relatives with breast and/or bowel cancer? Yes Yes Yes     Per patient has had genetic testing in the past and was negative for BRCA genes.     Mammogram Screening - Mammogram every 1-2 years updated in Health Maintenance based on mutual decision making. Pertinent mammograms are reviewed under the imaging tab.    History of abnormal Pap smear: No - age 65 or older with adequate negative prior screening test results (3 consecutive negative cytology results, 2 consecutive negative cotesting results, or 2 consecutive negative HrHPV test results within 10 years, with the most recent test occurring within the recommended screening interval for the test used).     Reviewed and updated as needed this visit by clinical staff   Tobacco  Allergies  Meds  Problems  Med Hx  Surg Hx  Fam Hx  Soc   Hx      Reviewed and updated as needed this visit by Provider   Nelson   "Allergies  Meds   Med Hx  Surg Hx  Fam Hx  Soc Hx Sexual   Activity        Review of Systems  CONSTITUTIONAL: NEGATIVE for fever, chills, change in weight  INTEGUMENTARY/SKIN: NEGATIVE for worrisome rashes, moles or lesions  EYES: NEGATIVE for vision changes or irritation  ENT/MOUTH: NEGATIVE for ear, mouth and throat problems  RESP: NEGATIVE for significant cough or SOB  BREAST: NEGATIVE for masses, tenderness or discharge  CV: NEGATIVE for chest pain, palpitations or peripheral edema  GI: NEGATIVE for nausea, abdominal pain, heartburn, or change in bowel habits  : NEGATIVE for frequency, dysuria, or hematuria  MUSCULOSKELETAL: NEGATIVE for significant arthralgias or myalgia  NEURO: NEGATIVE for weakness, dizziness or paresthesias  ENDOCRINE: NEGATIVE for temperature intolerance, skin/hair changes  HEME: NEGATIVE for bleeding problems  PSYCHIATRIC: NEGATIVE for changes in mood or affect    OBJECTIVE:   /80 (BP Location: Right arm, Patient Position: Chair, Cuff Size: Adult Regular)   Pulse 68   Temp 97.3  F (36.3  C) (Temporal)   Resp 20   Ht 1.727 m (5' 8\")   Wt 78.9 kg (174 lb)   LMP 06/20/2010   SpO2 98%   BMI 26.46 kg/m     Estimated body mass index is 26.46 kg/m  as calculated from the following:    Height as of this encounter: 1.727 m (5' 8\").    Weight as of this encounter: 78.9 kg (174 lb).    Physical Exam  GENERAL: alert and no distress  EYES: Eyes grossly normal to inspection, PERRL and conjunctivae and sclerae normal  HENT: ear canals and TM's normal, nose and mouth without ulcers or lesions  NECK: no adenopathy, no asymmetry, masses, or scars  RESP: lungs clear to auscultation - no rales, rhonchi or wheezes  BREAST: normal without masses, tenderness or nipple discharge and no palpable axillary masses or adenopathy  CV: regular rate and rhythm, normal S1 S2, no S3 or S4, no murmur, click or rub, no peripheral edema  ABDOMEN: soft, nontender, no hepatosplenomegaly, no masses and " bowel sounds normal   (female): declines, no concerns  MS: no gross musculoskeletal defects noted, no edema  SKIN: no suspicious lesions or rashes  NEURO: Normal strength and tone, mentation intact and speech normal  PSYCH: mentation appears normal, affect normal/bright    Lab work pending.     ASSESSMENT/PLAN:     Well woman exam without gynecological exam  - Patricia is overall doing well today. Encouraged healthy eating habits, daily exercise, reviewed screenings and immunizations, etc.     Benign essential hypertension  - Well controlled today, continue to follow with vascular specialist. Labs pending.   - VENOUS COLLECTION  - Comprehensive Metobolic Panel ([facility])    Aortic dilatation (H24)  - Continue to follow with vascular specialist.     Hyperlipidemia LDL goal <70  - Labs pending, continue to follow with vascular specialist.   - VENOUS COLLECTION  - Comprehensive Metobolic Panel ([facility])  - Lipid Panel ([facility])    Acquired hypothyroidism  - Labs pending, patient will be notified of results.   - VENOUS COLLECTION  - TSH WITH FREE T4 REFLEX ([facility])  - levothyroxine (SYNTHROID/LEVOTHROID) 50 MCG tablet; Take 1 tablet (50 mcg) by mouth daily    Urinary urgency  - Continue to follow with urology.     Screening for osteoporosis  - DEXA order placed.   - DX Bone Density  - Radiology Referral (Affiliate Use Only)    Counseling  Reviewed preventive health counseling, as reflected in patient instructions       Regular exercise       Healthy diet/nutrition       Vision screening       Immunizations  Declined: Pneumococcal due to Concerns about side effects/safety           Alcohol Use       Osteoporosis prevention/bone health       Consider lung cancer screening for ages 55-80 years (77 for Medicare) and 20 pack-year smoking history    She reports that she has quit smoking. She has been exposed to tobacco smoke. She has never used smokeless tobacco.            Signed Electronically by: Barbara Butt PA-C

## 2024-07-16 NOTE — PATIENT INSTRUCTIONS
Thanks for coming in today Patricia.     I will send you a my chart message with your lab results.     Recommend you get the pneumonia immunization.     Dexa scan order placed.     Please let me know if you have any questions or concerns.

## 2024-07-17 ENCOUNTER — TELEPHONE (OUTPATIENT)
Dept: UROLOGY | Facility: CLINIC | Age: 64
End: 2024-07-17
Payer: COMMERCIAL

## 2024-07-17 DIAGNOSIS — R39.15 URINARY URGENCY: ICD-10-CM

## 2024-07-17 PROBLEM — I10 BENIGN ESSENTIAL HYPERTENSION: Status: ACTIVE | Noted: 2024-07-17

## 2024-07-17 PROBLEM — E78.5 HYPERLIPIDEMIA LDL GOAL <70: Status: ACTIVE | Noted: 2024-07-17

## 2024-07-17 LAB
T4, FREE, NON-DIALYSIS - QUEST: 1.1 NG/DL (ref 0.8–1.8)
TSH SERPL-ACNC: 4.58 MIU/L (ref 0.4–4.5)

## 2024-07-17 RX ORDER — TROSPIUM CHLORIDE 20 MG/1
20 TABLET, FILM COATED ORAL
Qty: 180 TABLET | Refills: 1 | Status: SHIPPED | OUTPATIENT
Start: 2024-07-17 | End: 2024-07-26

## 2024-07-17 NOTE — TELEPHONE ENCOUNTER
[facility] Call Center    Phone Message    May a detailed message be left on voicemail: yes     Reason for Call: Medication Refill Request    Has the patient contacted the pharmacy for the refill? Yes   Name of medication being requested: trospium (SANCTURA) 20 MG tablet [[id number]]  Provider who prescribed the medication: Abi Ambriz   [id number] [place] [place] [facility] [address]  Date medication is needed: ASAP       Action Taken: Other: uro    Travel Screening: Not Applicable     Date of Service:

## 2024-07-18 ENCOUNTER — TELEPHONE (OUTPATIENT)
Dept: FAMILY MEDICINE | Facility: CLINIC | Age: 64
End: 2024-07-18

## 2024-07-18 DIAGNOSIS — E03.9 ACQUIRED HYPOTHYROIDISM: Primary | ICD-10-CM

## 2024-07-18 RX ORDER — LEVOTHYROXINE SODIUM 75 UG/1
75 TABLET ORAL DAILY
Qty: 90 TABLET | Refills: 0 | Status: SHIPPED | OUTPATIENT
Start: 2024-07-18

## 2024-07-18 NOTE — TELEPHONE ENCOUNTER
Called patient and left a voicemail that her TSH level is slightly elevated and that I recommend increasing the dose of her Levothyroxine from 50 mcg to 75 mcg. Will plan to send in a prescription for the next three months and then have patient schedule a lab only visit around 10/2024 for a recheck on her thyroid labs. She was instructed to call the clinic with any questions or concerns.    Barbara Butt PA-C  [facility]

## 2024-07-26 ENCOUNTER — OFFICE VISIT (OUTPATIENT)
Dept: UROLOGY | Facility: CLINIC | Age: 64
End: 2024-07-26
Payer: COMMERCIAL

## 2024-07-26 ENCOUNTER — VIRTUAL VISIT (OUTPATIENT)
Dept: OTHER | Facility: CLINIC | Age: 64
End: 2024-07-26
Attending: INTERNAL MEDICINE
Payer: COMMERCIAL

## 2024-07-26 VITALS
DIASTOLIC BLOOD PRESSURE: 90 MMHG | HEIGHT: 68 IN | WEIGHT: 165 LBS | BODY MASS INDEX: 25.01 KG/M2 | HEART RATE: 86 BPM | SYSTOLIC BLOOD PRESSURE: 129 MMHG

## 2024-07-26 DIAGNOSIS — E78.5 HYPERLIPIDEMIA LDL GOAL <70: ICD-10-CM

## 2024-07-26 DIAGNOSIS — R35.0 FREQUENCY OF URINATION: Primary | ICD-10-CM

## 2024-07-26 DIAGNOSIS — I77.810 ASCENDING AORTA DILATION (H): Primary | ICD-10-CM

## 2024-07-26 DIAGNOSIS — I10 BENIGN ESSENTIAL HYPERTENSION: ICD-10-CM

## 2024-07-26 DIAGNOSIS — I77.810 ASCENDING AORTA DILATION (H): ICD-10-CM

## 2024-07-26 DIAGNOSIS — I72.8 SPLENIC ARTERY ANEURYSM (H): ICD-10-CM

## 2024-07-26 DIAGNOSIS — R39.15 URINARY URGENCY: ICD-10-CM

## 2024-07-26 PROCEDURE — 51798 US URINE CAPACITY MEASURE: CPT | Performed by: PHYSICIAN ASSISTANT

## 2024-07-26 PROCEDURE — 99443 PR PHYSICIAN TELEPHONE EVALUATION 21-30 MIN: CPT | Mod: 93 | Performed by: INTERNAL MEDICINE

## 2024-07-26 PROCEDURE — 99213 OFFICE O/P EST LOW 20 MIN: CPT | Mod: 25 | Performed by: PHYSICIAN ASSISTANT

## 2024-07-26 RX ORDER — ROSUVASTATIN CALCIUM 10 MG/1
10 TABLET, COATED ORAL EVERY EVENING
Qty: 90 TABLET | Refills: 3 | Status: SHIPPED | OUTPATIENT
Start: 2024-07-26

## 2024-07-26 RX ORDER — TROSPIUM CHLORIDE 20 MG/1
20 TABLET, FILM COATED ORAL
Qty: 180 TABLET | Refills: 3 | Status: SHIPPED | OUTPATIENT
Start: 2024-07-26

## 2024-07-26 ASSESSMENT — ENCOUNTER SYMPTOMS
CONSTITUTIONAL NEGATIVE: 1
DYSURIA: 0
HEMATURIA: 0
FREQUENCY: 1
DIFFICULTY URINATING: 0

## 2024-07-26 ASSESSMENT — PAIN SCALES - GENERAL: PAINLEVEL: NO PAIN (0)
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
## 2024-07-26 NOTE — NURSING NOTE
Chief Complaint   Patient presents with    Urinary urgency     Pt here for 1 year follow up      Pt unable to leave urine specimen  Pt does not have any concerns today      PVR: 112 mL    Neeru Huffman, CMA

## 2024-07-26 NOTE — PROGRESS NOTES
"Patricia is a 63 year old who is being evaluated via a billable telephone visit.    What phone number would you like to be contacted at? [phone]  Follow-up to 1/3/24  Fasting labs  Follow up one week later - may be virtual or in-clinic.   How would you like to obtain your AVS? Mail a copy    Marion Marvin MA      Provider visit note:    Follow-up visit  Review of recent CT,  echocardiogram and laboratory tests  She has known history of, splenic artery aneurysms calcified , ascending aorta dilatation 4.2 cm initially noted lung cancer screening CT in December 2022, unchanged now  She quit smoking      History of present illness:  For full details please see my initial consult note and follow up notes     This is a very pleasant 63-year-old female with history of hypertension well-controlled with medications, hyperlipidemia recently initiated Crestor 10 mg daily tolerating and also history of hypothyroidism on replacement recent thyroid function tests are normal incidentally found ascending aorta dilatation 4.4 cm on lung cancer screening CT in December 2022 subsequently evaluated by me and adjusted the medications now she quit smoking blood pressure is good range but having intermittent palpitations.  She drinks 2 cups of coffee daily and socially drinks alcohol.  Not taking any antihistamines  Recent thyroid function tests are normal  Recent lipid panel looks good  No electrolyte abnormalities    Reviewed  previous Zio patch results few episodes of SVT 6-17 beats  Her palpitation symptoms are very infrequent now  Reviewed CTA     Review of systems: Reviewed all 12 point review of systems as per HPI otherwise unremarkable    Physical exam:( no physical exam done this is virtual visit)    Reviewed recent laboratory tests, imaging studies in the epic and updated chart    ECHO 2/2023  \"Interpretation Summary     Left ventricular systolic function is normal.  The visual ejection fraction is 65-70%.  No regional wall " "motion abnormalities noted.  The ascending aorta is Mildly dilated.(4.2cm)  The study was technically difficult. There is no comparison study available.\"    CTA CHEST ABDOMEN PELVIS WITH CONTRAST   12/29/2023 1:44 PM      HISTORY: Ascending aorta dilation (H24).     TECHNIQUE: CT angiogram of the chest, abdomen and pelvis was performed  following the administration of 72 cc intravenous contrast. Images are  viewed in multiple planes and 3-D reconstructions were also performed.  Radiation dose for this scan was reduced using automated exposure  control, adjustment of the mA and/or kV according to patient size, or  iterative reconstruction technique.     COMPARISON: CT of the chest dated 12/30/2022     FINDINGS:    Vascular exam:  Thoracic aorta: The thoracic aorta at the sinuses of Valsalva has a  maximum diameter of approximately 3.6 cm. The maximum diameter of the  ascending thoracic aorta is approximately 4.2 cm. The thoracic aorta  at the level of the arch and along its descending portions is of  normal caliber. The great vessels arising from the thoracic aortic  arch are patent without significant stenoses. Incidental note is made  of a direct origin of the left vertebral artery from the thoracic  aortic arch. This is a normal variant.     Abdominal aorta: The abdominal aorta is of normal caliber. The  visceral branches of the abdominal aorta are patent without  significant stenoses. There is a partially calcified 7 mm aneurysm  arising from the mid distal splenic artery and a partially calcified 8  mm aneurysm arising from the distal splenic artery.     The iliac arteries and proximal femoral arteries are patent without  significant stenoses.     Soft tissue exam:     Chest: There is no pathologic mediastinal, hilar, or axillary  lymphadenopathy. Mild coronary artery calcifications.     Abdomen/pelvis: There is a small subcentimeter cyst in the left  kidney. The remaining solid organs in the abdomen are " unremarkable.     There is a small periumbilical hernia containing mesenteric fat. The  bowel appears grossly unremarkable. There is a small hiatal hernia.  There is no pathologic abdominal or pelvic lymphadenopathy.                                                                      IMPRESSION:  1. 4.2 cm ascending thoracic aortic aneurysm.  2. Small partially calcified aneurysms arising from the splenic artery  as above.      NICOLE SCHULER MD      Assessment and plan:    1. Ascending Aorta dilation (H) 4.4 cm on lung cancer screening CT 12/30/2022, unchanged on CTA 12/29/23   2. Benign essential hypertension  3. Cigarette nicotine dependence with other nicotine-induced disorder, quit few months ago  4. Hyperlipidemia LDL goal <70  5. Palpitation with SVT 6-17 beats ( 23 episodes on recent Ziopatch )   6. calcified small splenic aneurysms 7 and 8 mm size     This is a very pleasant 63-year-old female incidental finding of ascending aorta dilatation 4.4 cm on lung cancer screening CT and she is a smoker smokes 1/3 pack cigarettes daily for 2 decades quit smoking recently .  She is normotensive but experiencing intermittent palpitations   CT scan also showed some atherosclerosis of coronary arteries  She has a family history of heart disease in both parents does not know full details     I reviewed CT scan with the patient, reviewed recent echocardiogram and recent labs with the patient  Suggested aggressive control of the risk factors specifically,  Congratulated for quitting smoking, maintain the same  Given mild atherosclerosis of coronary arteries, postmenopausal, ascending aorta dilatation will initiated Crestor 10 mg daily and continue the same, 90-day supply with 3 refills sent    Decrease caffeine intake and alcohol intake    Plan for repeat CTA of chest abdomen pelvis in dec 2024 CTA then followed by office or virtual visit     Phone visit start time: 8:50 AM  Phone Visit end time : 9:10 AM  Location of  the patient; at her home   Location of the provider: [facility]     21 minutes spent on the date of the encounter doing chart review, review of previous imaging studies, history, documentation and addressed above-mentioned issues coordinated future imaging studies and medications refilled AVS with written instructions done    Spoke to primary care provider      This visit is being conducted as a virtual visit due to the emphasis on mitigation of the COVID-19 virus pandemic. The clinician has decided that the risk of an in-office visit outweighs the benefit for this patient.       Donald Nathan MD, FAHA, FSVM, FNLA, FACP  Vascular medicine  Clinical hypertension specialist  Clinical lipidologist

## 2024-07-26 NOTE — PATIENT INSTRUCTIONS
Your Lipids looks good , continue crestor , refilled medication     Congratulations for quitting smoking , maintain same     Plan for CTA of Chest, abd , pelvis in Dec 2024 then virtual visit, our staff will schedule test in [facility]

## 2024-07-26 NOTE — PATIENT INSTRUCTIONS
-We will plan on continuing with trospium 20 mg twice daily.  Refill sent for 1 year.  You can trial once daily at bedtime, if you would like.     -If worsening urinary frequency or nighttime urination, we will be contacted.  Would plan on postvoid residual and discussion of other treatment options including trial of Myrbetriq or Gemtesa, pelvic floor physical therapy, or posterior tibial nerve stimulation.    -Follow up in September or October by phone or MyChart to see about Myrbetriq has transition to generic mirbegron.     -Tentative plan to follow-up in 1 year with office visit, urinalysis, and postvoid residual with med check.     Contact us in the interim with questions, concerns, or changes in symptomatology.  [phone]

## 2024-07-26 NOTE — LETTER
7/26/2024       RE: Patricia Ramirez  [address]  [facility] [address]     Dear Colleague,    Thank you for referring your patient, Patricia Ramirez, to the [facility] [place] [facility]. Please see a copy of my visit note below.    Subjective     CHIEF COMPLAINT/REASON FOR VISIT   Follow up on nocturia and urinary frequency    HISTORY OF PRESENT ILLNESS   Ms. Ramirez is very pleasant 63 year old year old female, who presents today for follow-up regarding urinary urgency and frequency.  I last saw her in July 2023.  She previously been on oxybutynin, which seem to manage her urinary symptoms well, but caused significant side effects with dry mouth even despite use of Biotene.    We did switch her to trospium 20 mg twice daily.  She does feel like this is helpful, but not as good as the oxybutynin.  However, the side effects not much more manageable.  She also does triggers for herself.  She does currently feel like she can live with her symptoms as they are.  She will occasionally have some urgency and frequency.  He does have quite a bit of frequency if she ingests coffee by itself in the morning.    She notes that she wears a small liner at bedtime and may have a few drops of leakage at night.  She occasionally will have an episode of nocturia, mainly if she drinks prior to bedtime.  Does not wear pads during the daytime.  Denies any hematuria or dysuria.    She is wondering about long-term use of bladder medications.  Myrbetriq was previously not covered.  She was unable to leave urine sample today.  Bladder scan of 112 mL.    The following portions of the patient's history were reviewed and updated as appropriate: allergies, current medications, past family history, past medical history, past social history, past surgical history, and problem list.     REVIEW OF SYSTEMS   Review of Systems   Constitutional: Negative.   "  Genitourinary:  Positive for frequency and urgency. Negative for difficulty urinating, dysuria and hematuria.      Per HPI.     Patient Active Problem List   Diagnosis     Acquired hypothyroidism     Contact dermatitis and other eczema due to plants (except food)     ACP (advance care planning)     Hypertonic bladder/ Dr Del Toro--followed by urology     Hepatitis B antibody positive in blood--evaluated by GI     Aortic dilatation (H)--followed by cardiology     Urinary urgency     Hyperlipidemia LDL goal <70     Benign essential hypertension      Past Medical History:   Diagnosis Date     Ascending aorta dilatation (H24)      Complication of anesthesia      Mumps      MV COLLISION NOS- 9/19/06/ cervicalgia 10/05/2006     Spider veins         Objective     PHYSICAL EXAM   BP (!) 129/90   Pulse 86   Ht 1.727 m (5' 8\")   Wt 74.8 kg (165 lb)   LMP 06/20/2010   BMI 25.09 kg/m     Physical Exam  Constitutional:       Appearance: Normal appearance.   HENT:      Head: Normocephalic.   Eyes:      General: No scleral icterus.  Pulmonary:      Effort: Pulmonary effort is normal.   Neurological:      General: No focal deficit present.      Mental Status: She is alert and oriented to person, place, and time.   Psychiatric:         Mood and Affect: Mood normal.         Behavior: Behavior normal.       LABORATORY     Unable to leave a urinalysis today.    TESTING    Bladder scan: 112 mL    Assessment & Plan   1. Frequency of urination    2. Urinary urgency        I had the pleasure today of meeting with Ms. Ramirez to discuss her her follow-up for urgency, frequency, and nocturia.  Patient rarely has nocturia.  She is able to manage her fluids to manage this.  She does feel like her urinary symptoms are where they can be lives with at this time.  She had asked about long-term side effects.  We did discuss the concern with possible memory issues with long-term use of anticholinergics.  We did discuss the trospium is " the least likely to cause this given that it does not cross the blood-brain barrier.  She previously had not had coverage for beta 3 agonist in the form of Myrbetriq.  It does appear that this may be going generic at the end of September of this year.    Patient is relatively comfortable symptoms at this time, but would do some mild experimenting in the interim.      Will plan on the following:    -We will plan on continuing with trospium 20 mg twice daily.  Refill sent for 1 year.  You can trial once daily at bedtime, if you would like.     -If worsening urinary frequency or nighttime urination, we will be contacted.  Would plan on postvoid residual and discussion of other treatment options including trial of Myrbetriq or Gemtesa, pelvic floor physical therapy, or posterior tibial nerve stimulation.    -Follow up in September or October by phone or MyChart to see about Myrbetriq has transition to generic mirbegron.     -Tentative plan to follow-up in 1 year with office visit, urinalysis, and postvoid residual with med check.     Contact us in the interim with questions, concerns, or changes in symptomatology.    Signed by:       Abi Ambriz PA-C 7/26/2024 1:41 PM       Again, thank you for allowing me to participate in the care of your patient.      Sincerely,    Abi Ambriz PA-C

## 2024-10-22 DIAGNOSIS — E03.9 ACQUIRED HYPOTHYROIDISM: ICD-10-CM

## 2024-10-22 PROCEDURE — 36415 COLL VENOUS BLD VENIPUNCTURE: CPT

## 2024-10-22 NOTE — LETTER
October 25, 2024      Patricia Caceresdelmar  [address] [facility] [address]        Dear ,    We are writing to inform you of your test results.    Your test results fall within the expected range(s) or remain unchanged from previous results.  Please continue with current treatment plan.    Your TSH level is in the normal range so we will plan to have you continue taking Levothyroxine at the 75 mcg dose, I have sent in a one year supply to the [facility] pharmacy in [place]. Please let me know if you have any questions or concerns.     Resulted Orders   TSH WITH FREE T4 REFLEX ([facility])   Result Value Ref Range    TSH 2.85 0.40 - 4.50 mIU/L       If you have any questions or concerns, please call the clinic at the number listed above.       Sincerely,      Barbara Butt PA-C

## 2024-10-23 LAB — TSH SERPL-ACNC: 2.85 MIU/L (ref 0.4–4.5)

## 2024-10-25 RX ORDER — LEVOTHYROXINE SODIUM 75 UG/1
75 TABLET ORAL DAILY
Qty: 90 TABLET | Refills: 3 | Status: SHIPPED | OUTPATIENT
Start: 2024-10-25

## 2024-11-04 ENCOUNTER — TELEPHONE (OUTPATIENT)
Dept: UROLOGY | Facility: CLINIC | Age: 64
End: 2024-11-04
Payer: COMMERCIAL

## 2024-11-04 NOTE — TELEPHONE ENCOUNTER
Myrbetriq is generic but still expensive, she will call after the first of the year, to check.  Holly Reed LPN

## 2024-11-04 NOTE — TELEPHONE ENCOUNTER
[facility] Call Center    Phone Message    May a detailed message be left on voicemail: Yes     Reason for Call: Other: Patient called and stated that at her last appointment they talked about a medication having a generic version. Please call patient back to discuss.     Action Taken: Other: [facility]    Travel Screening: Not Applicable     Date of Service:

## 2024-11-06 PROBLEM — M85.88 OSTEOPENIA OF LUMBAR SPINE: Status: ACTIVE | Noted: 2024-11-06

## 2024-11-07 ENCOUNTER — ALLIED HEALTH/NURSE VISIT (OUTPATIENT)
Dept: FAMILY MEDICINE | Facility: CLINIC | Age: 64
End: 2024-11-07

## 2024-11-07 DIAGNOSIS — Z23 NEED FOR VACCINATION: Primary | ICD-10-CM

## 2024-11-07 PROBLEM — M85.89 OSTEOPENIA OF MULTIPLE SITES: Status: ACTIVE | Noted: 2024-11-06

## 2024-11-07 PROCEDURE — 90471 IMMUNIZATION ADMIN: CPT

## 2024-11-07 PROCEDURE — 90662 IIV NO PRSV INCREASED AG IM: CPT

## 2024-12-05 ENCOUNTER — HOSPITAL ENCOUNTER (OUTPATIENT)
Dept: CT IMAGING | Facility: CLINIC | Age: 64
Discharge: HOME | End: 2024-12-05
Attending: INTERNAL MEDICINE
Payer: COMMERCIAL

## 2024-12-05 DIAGNOSIS — I72.8 SPLENIC ARTERY ANEURYSM (H): ICD-10-CM

## 2024-12-05 DIAGNOSIS — I77.810 ASCENDING AORTA DILATION (H): ICD-10-CM

## 2024-12-05 PROCEDURE — 250N000011 HC RX IP 250 OP 636: Performed by: INTERNAL MEDICINE

## 2024-12-05 PROCEDURE — 250N000009 HC RX 250: Performed by: INTERNAL MEDICINE

## 2024-12-05 PROCEDURE — 71275 CT ANGIOGRAPHY CHEST: CPT

## 2024-12-05 RX ORDER — IOPAMIDOL 755 MG/ML
500 INJECTION, SOLUTION INTRAVASCULAR ONCE
Status: COMPLETED | OUTPATIENT
Start: 2024-12-05 | End: 2024-12-05

## 2024-12-05 RX ADMIN — SODIUM CHLORIDE 80 ML: 9 INJECTION, SOLUTION INTRAVENOUS at 10:34

## 2024-12-05 RX ADMIN — IOPAMIDOL 72 ML: 755 INJECTION, SOLUTION INTRAVENOUS at 10:34

## 2024-12-16 ENCOUNTER — APPOINTMENT (OUTPATIENT)
Age: 64
Setting detail: DERMATOLOGY
End: 2024-12-16

## 2024-12-16 DIAGNOSIS — D22 MELANOCYTIC NEVI: ICD-10-CM

## 2024-12-16 DIAGNOSIS — Z87.2 PERSONAL HISTORY OF DISEASES OF THE SKIN AND SUBCUTANEOUS TISSUE: ICD-10-CM

## 2024-12-16 DIAGNOSIS — L82.0 INFLAMED SEBORRHEIC KERATOSIS: ICD-10-CM

## 2024-12-16 DIAGNOSIS — Z71.89 OTHER SPECIFIED COUNSELING: ICD-10-CM

## 2024-12-16 DIAGNOSIS — L82.1 OTHER SEBORRHEIC KERATOSIS: ICD-10-CM

## 2024-12-16 DIAGNOSIS — D18.0 HEMANGIOMA: ICD-10-CM

## 2024-12-16 PROBLEM — D22.5 MELANOCYTIC NEVI OF TRUNK: Status: ACTIVE | Noted: 2024-12-16

## 2024-12-16 PROBLEM — D48.5 NEOPLASM OF UNCERTAIN BEHAVIOR OF SKIN: Status: ACTIVE | Noted: 2024-12-16

## 2024-12-16 PROBLEM — D18.01 HEMANGIOMA OF SKIN AND SUBCUTANEOUS TISSUE: Status: ACTIVE | Noted: 2024-12-16

## 2024-12-16 PROCEDURE — ? BIOPSY BY SHAVE METHOD

## 2024-12-16 PROCEDURE — 99213 OFFICE O/P EST LOW 20 MIN: CPT | Mod: 25

## 2024-12-16 PROCEDURE — 11102 TANGNTL BX SKIN SINGLE LES: CPT

## 2024-12-16 PROCEDURE — ? COUNSELING

## 2024-12-16 PROCEDURE — ? SUNSCREEN RECOMMENDATIONS

## 2024-12-16 ASSESSMENT — LOCATION DETAILED DESCRIPTION DERM
LOCATION DETAILED: SUPERIOR THORACIC SPINE
LOCATION DETAILED: LEFT INFERIOR MEDIAL FOREHEAD
LOCATION DETAILED: EPIGASTRIC SKIN
LOCATION DETAILED: MIDDLE STERNUM
LOCATION DETAILED: NASAL DORSUM
LOCATION DETAILED: INFERIOR THORACIC SPINE
LOCATION DETAILED: RIGHT SUPERIOR MEDIAL MIDBACK
LOCATION DETAILED: RIGHT INFERIOR MEDIAL FOREHEAD

## 2024-12-16 ASSESSMENT — LOCATION ZONE DERM
LOCATION ZONE: FACE
LOCATION ZONE: TRUNK
LOCATION ZONE: NOSE

## 2024-12-16 ASSESSMENT — LOCATION SIMPLE DESCRIPTION DERM
LOCATION SIMPLE: CHEST
LOCATION SIMPLE: LEFT FOREHEAD
LOCATION SIMPLE: ABDOMEN
LOCATION SIMPLE: NOSE
LOCATION SIMPLE: UPPER BACK
LOCATION SIMPLE: RIGHT FOREHEAD
LOCATION SIMPLE: RIGHT LOWER BACK

## 2024-12-16 NOTE — HPI: SKIN LESION (ACTINIC KERATOSES)
How Severe Are They?: mild
Is This A New Presentation, Or A Follow-Up?: Follow Up Actinic Keratoses
Additional History: The patient had no actinic keratoses treated at the last visit.

## 2024-12-18 ENCOUNTER — OFFICE VISIT (OUTPATIENT)
Dept: OTHER | Facility: CLINIC | Age: 64
End: 2024-12-18
Attending: INTERNAL MEDICINE
Payer: COMMERCIAL

## 2024-12-18 VITALS
BODY MASS INDEX: 26.46 KG/M2 | HEART RATE: 81 BPM | WEIGHT: 174 LBS | OXYGEN SATURATION: 100 % | DIASTOLIC BLOOD PRESSURE: 91 MMHG | SYSTOLIC BLOOD PRESSURE: 139 MMHG

## 2024-12-18 DIAGNOSIS — I77.810 ASCENDING AORTA DILATION (H): Primary | ICD-10-CM

## 2024-12-18 DIAGNOSIS — I72.8 SPLENIC ARTERY ANEURYSM (H): ICD-10-CM

## 2024-12-18 DIAGNOSIS — E78.5 HYPERLIPIDEMIA LDL GOAL <70: ICD-10-CM

## 2024-12-18 DIAGNOSIS — I10 BENIGN ESSENTIAL HYPERTENSION: ICD-10-CM

## 2024-12-18 PROCEDURE — 99213 OFFICE O/P EST LOW 20 MIN: CPT | Performed by: INTERNAL MEDICINE

## 2024-12-18 PROCEDURE — G2211 COMPLEX E/M VISIT ADD ON: HCPCS | Performed by: INTERNAL MEDICINE

## 2024-12-18 PROCEDURE — 99215 OFFICE O/P EST HI 40 MIN: CPT | Performed by: INTERNAL MEDICINE

## 2024-12-18 RX ORDER — METOPROLOL SUCCINATE 25 MG/1
25 TABLET, EXTENDED RELEASE ORAL DAILY
Qty: 30 TABLET | Refills: 11 | Status: SHIPPED | OUTPATIENT
Start: 2024-12-18

## 2024-12-18 NOTE — PROGRESS NOTES
[facility]        Patient is here for a follow up.    Pt is currently taking Statin.    BP (!) 151/92 (BP Location: Right arm, Patient Position: Sitting, Cuff Size: Adult Regular)   Pulse 81   Wt 174 lb (78.9 kg)   LMP 06/20/2010   SpO2 100%   BMI 26.46 kg/m      The provider has been notified that the patient has no concerns.     Questions patient would like addressed today are: N/A.    Refills are needed: No    Has homecare services and agency name:  Casie Lion MA

## 2024-12-18 NOTE — PATIENT INSTRUCTIONS
Take metoprolol XL 25 mg daily in AM new Rx sent     Monitor BP and pulse at home goal is less than 130/80 and heart rate less than 70     Continue current medications     Repeat CTA of chest, abdomen and pelvis in one year Dec 2025 the visit

## 2024-12-18 NOTE — PROGRESS NOTES
[facility]ULAR [facility]  VASCULAR MEDICINE        PRIMARY HEALTH CARE PROVIDER:  Barbara Butt PA-C       Follow-up visit  Review of recent CT,  echocardiogram and laboratory tests  She has known history of, splenic artery aneurysms calcified , ascending aorta dilatation 4.2 cm initially noted lung cancer screening CT in December 2023, unchanged now  She quit smoking        History of present illness:  For full details please see my initial consult note and follow up notes      This is a very pleasant 64-year-old female with history of hypertension well-controlled with medications, hyperlipidemia recently initiated Crestor 10 mg daily tolerating and also history of hypothyroidism on replacement recent thyroid function tests are normal incidentally found ascending aorta dilatation 4.4 cm on lung cancer screening CT in December 2022 subsequently evaluated by me and adjusted the medications now she quit smoking blood pressure is good range but having intermittent palpitations.  She drinks 2 cups of coffee daily and socially drinks alcohol.  Not taking any antihistamines  Recent thyroid function tests are normal  Recent lipid panel looks good  No electrolyte abnormalities     Reviewed  previous Zio patch results few episodes of SVT 6-17 beats  Her palpitation symptoms are very infrequent now  Reviewed CTA  and labs   BP elevated        PAST MEDICAL HISTORY  Past Medical History:   Diagnosis Date    Ascending aorta dilatation (H)     Complication of anesthesia     Mumps     MV COLLISION NOS- 9/19/06/ cervicalgia 10/05/2006    Spider veins        CURRENT MEDICATIONS  Current Outpatient Medications   Medication Sig Dispense Refill    GLUCOSAMINE HCL--250 MG OR TABS 1 TABLET DAILY      levothyroxine (SYNTHROID/LEVOTHROID) 75 MCG tablet Take 1 tablet (75 mcg) by mouth daily. 90 tablet 3    MULTIPLE VITAMINS/WOMENS OR TABS 1 TABLET DAILY      rosuvastatin (CRESTOR) 10 MG tablet Take 1 tablet  (10 mg) by mouth every evening 90 tablet 3    trospium (SANCTURA) 20 MG tablet Take 1 tablet (20 mg) by mouth 2 times daily (before meals) (Patient taking differently: Take 20 mg by mouth daily.) 180 tablet 3     No current facility-administered medications for this visit.       PAST SURGICAL HISTORY:  Past Surgical History:   Procedure Laterality Date    CATARACT EXTRACTION, BILATERAL      CYSTOSCOPY      MOUTH SURGERY      wisdom teeth and some teeth pulled    ZZHC COLONOSCOPY THRU STOMA, DIAGNOSTIC  06/2009    Normal       ALLERGIES     Allergies   Allergen Reactions    Acetaminophen Nausea and Vomiting     Only when combined with oxy    Hydrocodone Nausea and Vomiting    Other [No Clinical Screening - See Comments] Nausea and Vomiting     Gets sick feeling off all narcotics    Oxycodone Nausea and Vomiting       FAMILY HISTORY  Family History   Problem Relation Age of Onset    Hypertension Mother     Cerebrovascular Disease Mother     Heart Disease Mother     Breast Cancer Mother     Coronary Artery Disease Father     Diabetes Type 2  Brother     Hypertension Brother     Chronic Kidney Disease Brother     Alcoholism Brother     Chronic Obstructive Pulmonary Disease Brother     Breast Cancer Maternal Grandmother     Cancer Maternal Aunt         lung    Colon Cancer No family hx of     Ovarian Cancer No family hx of            SOCIAL HISTORY  Social History     Socioeconomic History    Marital status:      Spouse name: Not on file    Number of children: 4    Years of education: Not on file    Highest education level: Not on file   Occupational History    Not on file   Tobacco Use    Smoking status: Former     Passive exposure: Past    Smokeless tobacco: Never   Vaping Use    Vaping status: Never Used   Substance and Sexual Activity    Alcohol use: Yes     Alcohol/week: 1.0 standard drink of alcohol     Types: 1 Standard drinks or equivalent per week     Comment: socially    Drug use: No    Sexual activity:  Yes     Partners: Male     Birth control/protection: Spermicide, Post-menopausal     Comment:    Other Topics Concern    Parent/sibling w/ CABG, MI or angioplasty before 65F 55M? Not Asked   Social History Narrative    Not on file     Social Drivers of Health     Financial Resource Strain: Low Risk  (3/10/2021)    Overall Financial Resource Strain (CARDIA)     Difficulty of Paying Living Expenses: Not hard at all   Food Insecurity: No Food Insecurity (3/10/2021)    Hunger Vital Sign     Worried About Running Out of Food in the Last Year: Never true     Ran Out of Food in the Last Year: Never true   Transportation Needs: No Transportation Needs (3/10/2021)    PRAPARE - Transportation     Lack of Transportation (Medical): No     Lack of Transportation (Non-Medical): No   Physical Activity: Not on file   Stress: Not on file   Social Connections: Not on file   Interpersonal Safety: Not on file   Housing Stability: Not on file       ROS:   General: No change in weight, sleep or appetite.  Normal energy.  No fever or chills  Eyes: Negative for vision changes or eye problems  ENT: No problems with ears, nose or throat.  No difficulty swallowing.  Resp: No coughing, wheezing or shortness of breath  CV: No chest pains or palpitations  GI: No nausea, vomiting,  heartburn, abdominal pain, diarrhea, constipation or change in bowel habits  : No urinary frequency or dysuria, bladder or kidney problems  Musculoskeletal: No significant muscle or joint pains  Neurologic: No headaches, numbness, tingling, weakness, problems with balance or coordination  Psychiatric: No problems with anxiety, depression or mental health  Heme/immune/allergy: No history of bleeding or clotting problems or anemia.  No allergies or immune system problems  Endocrine: No history of thyroid disease, diabetes or other endocrine disorders  Skin: No rashes,worrisome lesions or skin problems  Vascular:  No claudication, lifestyle limiting or otherwise;  no ischemic rest pain; no non-healing ulcers. No weakness, No loss of sensation        EXAM:  BP (!) 151/92 (BP Location: Right arm, Patient Position: Sitting, Cuff Size: Adult Regular)   Pulse 81   Wt 174 lb (78.9 kg)   LMP 06/20/2010   SpO2 100%   BMI 26.46 kg/m    In general, the patient is a pleasant female in no apparent distress.    HEENT: NC/AT.  PERRLA.  EOMI.  Sclerae white, not injected.  Nares clear.  Pharynx without erythema or exudate.  Dentition intact.    Neck: No adenopathy.  No thyromegaly. Carotids +2/2 bilaterally without bruits.  No jugular venous distension.   Heart: RRR. Normal S1, S2  Lungs: CTA.  No ronchi, wheezes, rales.  No dullness to percussion.   Abdomen: Soft, nontender, nondistended. No organomegaly. No AAA.  No bruits.   Extremities: No clubbing, cyanosis, or edema.  No wounds.       Labs:  LIPID RESULTS:  Lab Results   Component Value Date    CHOL 131 07/16/2024    HDL 59 07/16/2024    LDL 69 07/14/2023     (H) 06/29/2018    TRIG 50 07/16/2024    CHOLHDLRATIO 2 07/16/2024       LIVER ENZYME RESULTS:  Lab Results   Component Value Date    AST 19 08/25/2014    ALT 19 08/25/2014       CBC RESULTS:  Lab Results   Component Value Date    WBC 8.7 10/01/2021    RBC 4.18 10/01/2021    HGB 13.7 10/01/2021    HCT 41.7 10/01/2021    MCV 99.7 10/01/2021    MCH 32.8 10/01/2021    MCHC 32.9 10/01/2021    RDW 12.2 07/03/2019     10/01/2021       BMP RESULTS:  Lab Results   Component Value Date    .2 07/16/2024    POTASSIUM 4.55 07/16/2024    CHLORIDE 104.9 07/16/2024    CO2 27.6 07/16/2024    GLC 92 07/16/2024    BUN 16 07/16/2024    BUN 17 07/16/2024    CR 0.95 07/16/2024    GFRESTIMATED 66 06/22/2017    ROBINSON 10.3 07/16/2024        THYROID RESULTS:  Lab Results   Component Value Date    TSH 2.85 10/22/2024         Procedures:     CTA CHEST ABDOMEN PELVIS W CONTRAST   12/5/2024 10:51 AM      HISTORY: hx ascending aorta; Ascending aorta dilation (H); Splenic  artery aneurysm  (H); Ascending aorta dilation (H)     TECHNIQUE: CT angiogram of the chest, abdomen and pelvis was performed  following the administration of 72 cc intravenous contrast. Images are  viewed in multiple planes and 3-D reconstructions were also performed.  Radiation dose for this scan was reduced using automated exposure  control, adjustment of the mA and/or kV according to patient size, or  iterative reconstruction technique.     COMPARISON: CT of the chest dated 12/29/2023     FINDINGS:    Vascular exam:  Thoracic aorta: The thoracic aorta at the sinuses of Valsalva has a  maximum diameter of approximately 3.4 cm. The maximum diameter of the  ascending thoracic aorta is approximately 4.2 cm. The thoracic aorta  at the level of the arch and along its descending portions is of  normal caliber. The great vessels arising from the thoracic aortic  arch are patent without significant stenoses. Incidental note is made  of a direct origin of the left vertebral artery from the thoracic  aortic arch. This is a normal variant.     Abdominal aorta: The abdominal aorta is of normal caliber. The  visceral branches of the abdominal aorta are patent without  significant stenoses. There is a partially calcified 7 mm aneurysm  arising from the mid distal splenic artery and a partially calcified 8  mm aneurysm arising from the distal splenic artery.     The iliac arteries and proximal femoral arteries are patent without  significant stenoses.     Soft tissue exam:     Chest: There is no pathologic mediastinal, hilar, or axillary  lymphadenopathy. Mild coronary artery calcifications. There is a  calcified granuloma in the left upper lobe.     Abdomen/pelvis: There is a small subcentimeter cyst in the left  kidney. The remaining solid organs in the abdomen are unremarkable.     There is a small periumbilical hernia containing mesenteric fat. The  bowel appears grossly unremarkable. There is a small hiatal hernia.  There is no pathologic  "abdominal or pelvic lymphadenopathy.                                                                      IMPRESSION:  1. 4.2 cm ascending thoracic aortic aneurysm. This is unchanged in  size.  2. Small partially calcified aneurysms arising from the splenic artery  as above.   These are unchanged.  NICOLE SCHULER MD       ECHO 2/2023  \"Interpretation Summary     Left ventricular systolic function is normal.  The visual ejection fraction is 65-70%.  No regional wall motion abnormalities noted.  The ascending aorta is Mildly dilated.(4.2cm)  The study was technically difficult. There is no comparison study available.\"      Assessment and Plan:     1. Ascending Aorta dilation (H) 4.4 cm on lung cancer screening CT 12/30/2022, unchanged on CTA 12/29/23   2. Benign essential hypertension  3. Cigarette nicotine dependence with other nicotine-induced disorder, quit few months ago  4. Hyperlipidemia LDL goal <70  5. Palpitation with SVT 6-17 beats ( 23 episodes on recent Ziopatch )   6. calcified small splenic aneurysms 7 and 8 mm size  7. HTN     This is a very pleasant 64-year-old female incidental finding of ascending aorta dilatation 4.4 cm on lung cancer screening CT and she is a smoker smokes 1/3 pack cigarettes daily for 2 decades quit smoking recently .  She is normotensive but experiencing intermittent palpitations   CT scan also showed some atherosclerosis of coronary arteries  She has a family history of heart disease in both parents does not know full details     I reviewed CT scan with the patient, reviewed recent echocardiogram and recent labs with the patient  Suggested aggressive control of the risk factors specifically,  Congratulated for quitting smoking, maintain the same  Given mild atherosclerosis of coronary arteries, postmenopausal, ascending aorta dilatation , initiated Crestor 10 mg daily and continue the same, 90-day supply with 3 refills sent     Decrease caffeine intake and alcohol " intake    For labile HTN she will benefit with BB started metoprolol XL 25 daily new Rx sent       40 minutes spent on the date of the encounter doing chart review, history and exam, documentation, and further activities as noted above.    The longitudinal care of plan for the above diagnoses was addressed during this visit. Due to added complexity of care, we will continue to supprt Patricia Ramirez and the subsequent management of this/these conditions and with ongoing continuity of care for this/these conditions.       Donald Nathan MD,FASABRA,FSVM,FNLA, FACP  Vascular Medicine  Clinical Hypertension Specialist   Clinical Lipidologist

## 2025-01-21 ENCOUNTER — APPOINTMENT (OUTPATIENT)
Age: 65
Setting detail: DERMATOLOGY
End: 2025-01-21

## 2025-01-21 PROBLEM — C44.321 SQUAMOUS CELL CARCINOMA OF SKIN OF NOSE: Status: ACTIVE | Noted: 2025-01-21

## 2025-01-21 PROCEDURE — ? MOHS SURGERY

## 2025-01-21 PROCEDURE — 17311 MOHS 1 STAGE H/N/HF/G: CPT

## 2025-01-21 NOTE — PROCEDURE: MOHS SURGERY
Include Suturegard In Note?: No
Special Stains Stage 6 - Results: Base On Clearance Noted Above
Suturegard Body: The suture ends were repeatedly re-tightened and re-clamped to achieve the desired tissue expansion.
Provider Procedure Text (B): After obtaining clear surgical margins the defect was repaired by another provider.
Island Pedicle Flap Text: The defect edges were debeveled with a #15 scalpel blade. Given the location of the defect, shape of the defect and the proximity to free margins an island pedicle advancement flap was deemed most appropriate. Using a sterile surgical marker, an appropriate advancement flap was drawn incorporating the defect, outlining the appropriate donor tissue and placing the expected incisions within the relaxed skin tension lines where possible. The area thus outlined was incised deep to adipose tissue with a #15 scalpel blade. The skin margins were undermined to an appropriate distance in all directions around the primary defect and laterally outward around the island pedicle utilizing iris scissors.  There was minimal undermining beneath the pedicle flap. Following this, the flap was carried over into the primary defect and sutured into place.
Initial Size Of Lesion: 0.5
Stage 2: Additional Anesthesia Type: 1% lidocaine with epinephrine
Consent (Near Eyelid Margin)/Introductory Paragraph: The rationale for Mohs was explained to the patient and consent was obtained. The risks, benefits and alternatives to therapy were discussed in detail. Specifically, the risks of ectropion or eyelid deformity, infection, scarring, bleeding, prolonged wound healing, incomplete removal, allergy to anesthesia, nerve injury and recurrence were addressed. Prior to the procedure, the treatment site was clearly identified and confirmed by the patient. All components of Universal Protocol/PAUSE Rule completed.
Zygomaticofacial Flap Text: Given the location of the defect, shape of the defect and the proximity to free margins a zygomaticofacial flap was deemed most appropriate for repair. Using a sterile surgical marker, the appropriate flap was drawn incorporating the defect and placing the expected incisions within the relaxed skin tension lines where possible. The area thus outlined was incised deep to adipose tissue with a #15 scalpel blade with preservation of a vascular pedicle.  The skin margins were undermined to an appropriate distance in all directions utilizing iris scissors. The flap was then carried over into the defect and anchored with interrupted buried subcutaneous sutures.
Inflammation Suggestive Of Cancer Camouflage Histology Text: There was a dense lymphocytic infiltrate which prevented adequate histologic evaluation of adjacent structures.
Bill 59 Modifier?: No - Continue to Bill 79 Modifier
V-Y Plasty Text: The defect edges were debeveled with a #15 scalpel blade. Given the location of the defect, shape of the defect and the proximity to free margins an V-Y advancement flap was deemed most appropriate. Using a sterile surgical marker, an appropriate advancement flap was drawn incorporating the defect and placing the expected incisions within the relaxed skin tension lines where possible. The area thus outlined was incised deep to adipose tissue with a #15 scalpel blade. The skin margins were undermined to an appropriate distance in all directions utilizing iris scissors. Following this, the designed flap was advanced and carried over into the primary defect and sutured into place.
Rhombic Flap Text: The defect edges were debeveled with a #15 scalpel blade. Given the location of the defect and the proximity to free margins a rhombic flap was deemed most appropriate. Using a sterile surgical marker, an appropriate rhombic flap was drawn incorporating the defect. The area thus outlined was incised deep to adipose tissue with a #15 scalpel blade. The skin margins were undermined to an appropriate distance in all directions utilizing iris scissors. Following this, the designed flap was carried over into the primary defect and sutured into place.
Quadrants Reporting?: 0
Show Asc Variables: Yes
Vermilion Border Text: The closure involved the vermilion border.
Muscle Hinge Flap Text: The defect edges were debeveled with a #15 scalpel blade.  Given the size, depth and location of the defect and the proximity to free margins a muscle hinge flap was deemed most appropriate. Using a sterile surgical marker, an appropriate hinge flap was drawn incorporating the defect. The area thus outlined was incised with a #15 scalpel blade. The skin margins were undermined to an appropriate distance in all directions utilizing iris scissors. Following this, the designed flap was carried into the primary defect and sutured into place.
Estlander Flap (Lower To Upper Lip) Text: The defect of the lower lip was assessed and measured.  Given the location and size of the defect, an Estlander flap was deemed most appropriate. Using a sterile surgical marker, an appropriate Estlander flap was measured and drawn on the upper lip. Local anesthesia was then infiltrated. A scalpel was then used to incise the lateral aspect of the flap, through skin, muscle and mucosa, leaving the flap pedicled medially.  The flap was then rotated and positioned to fill the lower lip defect.  The flap was then sutured into place with a three layer technique, closing the orbicularis oris muscle layer with subcutaneous buried sutures, followed by a mucosal layer and an epidermal layer.
Where Do You Want The Question To Include Opioid Counseling Located?: Case Summary Tab
Star Wedge Flap Text: The defect edges were debeveled with a #15 scalpel blade. Given the location of the defect, shape of the defect and the proximity to free margins a star wedge flap was deemed most appropriate. Using a sterile surgical marker, an appropriate rotation flap was drawn incorporating the defect and placing the expected incisions within the relaxed skin tension lines where possible. The area thus outlined was incised deep to adipose tissue with a #15 scalpel blade. The skin margins were undermined to an appropriate distance in all directions utilizing iris scissors. Following this, the designed flap was carried over into the primary defect and sutured into place.
Eyelid Full Thickness Repair - 85433: The eyelid defect was full thickness which required a wedge repair of the eyelid. Special care was taken to ensure that the eyelid margin was realligned when placing sutures.
Retention Suture Bite Size: 3 mm
Hemigard Retention Suture: 2-0 Nylon
Postop Diagnosis: same
Deep Sutures: 5-0 Vicryl
Burow's Advancement Flap Text: The defect edges were debeveled with a #15 scalpel blade. Given the location of the defect and the proximity to free margins a Burow's advancement flap was deemed most appropriate. Using a sterile surgical marker, the appropriate advancement flap was drawn incorporating the defect and placing the expected incisions within the relaxed skin tension lines where possible. The area thus outlined was incised deep to adipose tissue with a #15 scalpel blade. The skin margins were undermined to an appropriate distance in all directions utilizing iris scissors. Following this, the designed flap was advanced and carried over into the primary defect and sutured into place.
W Plasty Text: The lesion was extirpated to the level of the fat with a #15 scalpel blade. Given the location of the defect, shape of the defect and the proximity to free margins a W-plasty was deemed most appropriate for repair. Using a sterile surgical marker, the appropriate transposition arms of the W-plasty were drawn incorporating the defect and placing the expected incisions within the relaxed skin tension lines where possible. The area thus outlined was incised deep to adipose tissue with a #15 scalpel blade. The skin margins were undermined to an appropriate distance in all directions utilizing iris scissors. The opposing transposition arms were then transposed and carried over into place in opposite direction and anchored with interrupted buried subcutaneous sutures.
Repair Type: Repair deferred until later date
Cheiloplasty (Complex) Text: A decision was made to reconstruct the defect with a  cheiloplasty.  The defect was undermined extensively.  Additional orbicularis oris muscle was excised with a 15 blade scalpel.  The defect was converted into a full thickness wedge to facilite a better cosmetic result.  Small vessels were then tied off with 5-0 monocyrl. The orbicularis oris, superficial fascia, adipose and dermis were then reapproximated.  After the deeper layers were approximated the epidermis was reapproximated with particular care given to realign the vermilion border.
Wound Care (No Sutures): Petrolatum
Tissue Cultured Epidermal Autograft Text: The defect edges were debeveled with a #15 scalpel blade. Given the location of the defect, shape of the defect and the proximity to free margins a tissue cultured epidermal autograft was deemed most appropriate.  The graft was then trimmed to fit the size of the defect.  The graft was then placed in the primary defect and oriented appropriately.
Consent 1/Introductory Paragraph: The rationale for Mohs was explained to the patient and consent was obtained. The risks, benefits and alternatives to therapy were discussed in detail. Specifically, the risks of infection, scarring, bleeding, prolonged wound healing, incomplete removal, allergy to anesthesia, nerve injury and recurrence were addressed. Prior to the procedure, the treatment site was clearly identified and confirmed by the patient. All components of Universal Protocol/PAUSE Rule completed.
Complex Repair And Flap Additional Text (Will Appearing After The Standard Complex Repair Text): The complex repair was not sufficient to completely close the primary defect. The remaining additional defect was repaired with the flap mentioned below.
Closure 4 Information: This tab is for additional flaps and grafts above and beyond our usual structured repairs.  Please note if you enter information here it will not currently bill and you will need to add the billing information manually.
Helical Rim Advancement Flap Text: The defect edges were debeveled with a #15 blade scalpel.  Given the location of the defect and the proximity to free margins (helical rim) a double helical rim advancement flap was deemed most appropriate. Using a sterile surgical marker, the appropriate advancement flaps were drawn incorporating the defect and placing the expected incisions between the helical rim and antihelix where possible.  The area thus outlined was incised through and through with a #15 scalpel blade.  With a skin hook and iris scissors, the flaps were gently and sharply undermined and freed up. Folllowing this, the designed flaps were carried over into the primary defect and sutured into place.
Undermining Type: Entire Wound
Consent 3/Introductory Paragraph: I gave the patient a chance to ask questions they had about the procedure.  Following this I explained the Mohs procedure and consent was obtained. The risks, benefits and alternatives to therapy were discussed in detail. Specifically, the risks of infection, scarring, bleeding, prolonged wound healing, incomplete removal, allergy to anesthesia, nerve injury and recurrence were addressed. Prior to the procedure, the treatment site was clearly identified and confirmed by the patient. All components of Universal Protocol/PAUSE Rule completed.
Mart-1 - Negative Histology Text: MART-1 staining demonstrates a normal density and pattern of melanocytes along the dermal-epidermal junction. The surgical margins are negative for tumor cells.
Z Plasty Text: The lesion was extirpated to the level of the fat with a #15 scalpel blade. Given the location of the defect, shape of the defect and the proximity to free margins a Z-plasty was deemed most appropriate for repair. Using a sterile surgical marker, the appropriate transposition arms of the Z-plasty were drawn incorporating the defect and placing the expected incisions within the relaxed skin tension lines where possible. The area thus outlined was incised deep to adipose tissue with a #15 scalpel blade. The skin margins were undermined to an appropriate distance in all directions utilizing iris scissors. The opposing transposition arms were then transposed and carried over into place in opposite direction and anchored with interrupted buried subcutaneous sutures.
Lazy S Intermediate Repair Preamble Text (Leave Blank If You Do Not Want): Undermining was performed with blunt dissection.
Referred To Oculoplastics For Closure Text (Leave Blank If You Do Not Want): After obtaining clear surgical margins the patient was sent to oculoplastics for surgical repair.  The patient understands they will receive post-surgical care and follow-up from the referring physician's office.
Plastic Surgeon Procedure Text (E): After obtaining clear surgical margins the patient was sent to plastics for surgical repair.  The patient understands they will receive post-surgical care and follow-up from the referring physician's office.
Manual Repair Warning Statement: We plan on removing the manually selected variable below in favor of our much easier automatic structured text blocks found in the previous tab. We decided to do this to help make the flow better and give you the full power of structured data. Manual selection is never going to be ideal in our platform and I would encourage you to avoid using manual selection from this point on, especially since I will be sunsetting this feature. It is important that you do one of two things with the customized text below. First, you can save all of the text in a word file so you can have it for future reference. Second, transfer the text to the appropriate area in the Library tab. Lastly, if there is a flap or graft type which we do not have you need to let us know right away so I can add it in before the variable is hidden. No need to panic, we plan to give you roughly 6 months to make the change.
Ftsg Text: The defect edges were debeveled with a #15 scalpel blade. Given the location of the defect, shape of the defect and the proximity to free margins a full thickness skin graft was deemed most appropriate. Using a sterile surgical marker, the primary defect shape was transferred to the donor site. The area thus outlined was incised deep to adipose tissue with a #15 scalpel blade.  The harvested graft was then trimmed of adipose tissue until only dermis and epidermis was left.  The skin graft was then placed in the primary defect and oriented appropriately.
Transposition Flap Text: The defect edges were debeveled with a #15 scalpel blade. Given the location of the defect and the proximity to free margins a transposition flap was deemed most appropriate. Using a sterile surgical marker, an appropriate transposition flap was drawn incorporating the defect. The area thus outlined was incised deep to adipose tissue with a #15 scalpel blade. The skin margins were undermined to an appropriate distance in all directions utilizing iris scissors. Following this, the designed flap was carried over into the primary defect and sutured into place.
Crescentic Advancement Flap Text: The defect edges were debeveled with a #15 scalpel blade. Given the location of the defect and the proximity to free margins a crescentic advancement flap was deemed most appropriate. Using a sterile surgical marker, the appropriate advancement flap was drawn incorporating the defect and placing the expected incisions within the relaxed skin tension lines where possible. The area thus outlined was incised deep to adipose tissue with a #15 scalpel blade. The skin margins were undermined to an appropriate distance in all directions utilizing iris scissors. Following this, the designed flap was advanced and carried over into the primary defect and sutured into place.
Island Pedicle Flap-Requiring Vessel Identification Text: The defect edges were debeveled with a #15 scalpel blade. Given the location of the defect, shape of the defect and the proximity to free margins an island pedicle advancement flap was deemed most appropriate. Using a sterile surgical marker, an appropriate advancement flap was drawn, based on the axial vessel mentioned above, incorporating the defect, outlining the appropriate donor tissue and placing the expected incisions within the relaxed skin tension lines where possible. The area thus outlined was incised deep to adipose tissue with a #15 scalpel blade. The skin margins were undermined to an appropriate distance in all directions around the primary defect and laterally outward around the island pedicle utilizing iris scissors.  There was minimal undermining beneath the pedicle flap. Following this, the designed flap was carried over into the primary defect and sutured into place.
Mohs Histo Method Verbiage: Each section was then chromacoded and processed in the Mohs lab using the Mohs protocol and submitted for frozen section, utilizing hematoxylin and eosin histochemical stains.
Otolaryngologist Procedure Text (E): After obtaining clear surgical margins the patient was sent to otolaryngology for surgical repair.  The patient understands they will receive post-surgical care and follow-up from the referring physician's office.
Suture Removal: 7 days
Additional Anesthesia Type: 0.5% bupivacaine
Consent (Lip)/Introductory Paragraph: The rationale for Mohs was explained to the patient and consent was obtained. The risks, benefits and alternatives to therapy were discussed in detail. Specifically, the risks of lip deformity, changes in the oral aperture, infection, scarring, bleeding, prolonged wound healing, incomplete removal, allergy to anesthesia, nerve injury and recurrence were addressed. Prior to the procedure, the treatment site was clearly identified and confirmed by the patient. All components of Universal Protocol/PAUSE Rule completed.
Banner Transposition Flap Text: The defect edges were debeveled with a #15 scalpel blade. Given the location of the defect and the proximity to free margins a Banner transposition flap was deemed most appropriate. Using a sterile surgical marker, an appropriate flap was drawn around the defect. The area thus outlined was incised deep to adipose tissue with a #15 scalpel blade. The skin margins were undermined to an appropriate distance in all directions utilizing iris scissors. Following this, the designed flap was carried into the primary defect and sutured into place.
Epidermal Sutures: 6-0 Ethilon
Localized Dermabrasion With 15 Blade Text: The patient was draped in routine manner.  Localized dermabrasion using a 15 blade was performed in routine manner to papillary dermis. This spot dermabrasion is being performed to complete skin cancer reconstruction. It also will eliminate the other sun damaged precancerous cells that are known to be part of the regional effect of a lifetime's worth of sun exposure. This localized dermabrasion is therapeutic and should not be considered cosmetic in any regard.
Cheiloplasty (Less Than 50%) Text: A decision was made to reconstruct the defect with a  cheiloplasty.  The defect was undermined extensively.  Additional orbicularis oris muscle was excised with a 15 blade scalpel.  The defect was converted into a full thickness wedge, of less than 50% of the vertical height of the lip, to facilite a better cosmetic result.  Small vessels were then tied off with 5-0 monocyrl. The orbicularis oris, superficial fascia, adipose and dermis were then reapproximated.  After the deeper layers were approximated the epidermis was reapproximated with particular care given to realign the vermilion border.
Primary Defect Length In Cm (Final Defect Size - Required For Flaps/Grafts): 0.9
Rectangular Flap Text: The defect edges were debeveled with a #15 scalpel blade. Given the location of the defect and the proximity to free margins a rectangular flap was deemed most appropriate. Using a sterile surgical marker, an appropriate rectangular flap was drawn incorporating the defect. The area thus outlined was incised deep to adipose tissue with a #15 scalpel blade. The skin margins were undermined to an appropriate distance in all directions utilizing iris scissors. Following this, the designed flap was carried over into the primary defect and sutured into place.
Anesthesia Volume In Cc: 2
Dressing (No Sutures): dry sterile dressing
Referred To Asc For Closure Text (Leave Blank If You Do Not Want): After obtaining clear surgical margins the patient was sent to an ASC for surgical repair.  The patient understands they will receive post-surgical care and follow-up from the ASC physician.
Epidermal Closure: simple interrupted
Consent Type: Consent 1 (Standard)
Purse String (Intermediate) Text: Given the location of the defect and the characteristics of the surrounding skin a purse string intermediate closure was deemed most appropriate.  Undermining was performed circumferentially around the surgical defect.  A purse string suture was then placed and tightened.
O-T Plasty Text: The defect edges were debeveled with a #15 scalpel blade. Given the location of the defect, shape of the defect and the proximity to free margins an O-T plasty was deemed most appropriate. Using a sterile surgical marker, an appropriate O-T plasty was drawn incorporating the defect and placing the expected incisions within the relaxed skin tension lines where possible. The area thus outlined was incised deep to adipose tissue with a #15 scalpel blade. The skin margins were undermined to an appropriate distance in all directions utilizing iris scissors. Following this, the designed flap was carried over into the primary defect and sutured into place.
Consent (Spinal Accessory)/Introductory Paragraph: The rationale for Mohs was explained to the patient and consent was obtained. The risks, benefits and alternatives to therapy were discussed in detail. Specifically, the risks of damage to the spinal accessory nerve, infection, scarring, bleeding, prolonged wound healing, incomplete removal, allergy to anesthesia, and recurrence were addressed. Prior to the procedure, the treatment site was clearly identified and confirmed by the patient. All components of Universal Protocol/PAUSE Rule completed.
Bilobed Transposition Flap Text: The defect edges were debeveled with a #15 scalpel blade. Given the location of the defect and the proximity to free margins a bilobed transposition flap was deemed most appropriate. Using a sterile surgical marker, an appropriate bilobe flap drawn around the defect. The area thus outlined was incised deep to adipose tissue with a #15 scalpel blade. The skin margins were undermined to an appropriate distance in all directions utilizing iris scissors. Following this, the designed flap was carried over into the primary defect and sutured into place.
Mid-Level Procedure Text (F): After obtaining clear surgical margins the patient was sent to a mid-level provider for surgical repair.  The patient understands they will receive post-surgical care and follow-up from the mid-level provider.
Area M Indication Text: Tumors in this location are included in Area M (cheek, forehead, scalp, neck, jawline and pretibial skin).  Mohs surgery is indicated for tumors in these anatomic locations.
Staging Info: By selecting yes to the question above you will include information on AJCC 8 tumor staging in your Mohs note. Information on tumor staging will be automatically added for SCCs on the head and neck. AJCC 8 includes tumor size, tumor depth, perineural involvement and bone invasion.
Chonodrocutaneous Helical Advancement Flap Text: The defect edges were debeveled with a #15 scalpel blade. Given the location of the defect and the proximity to free margins a chondrocutaneous helical advancement flap was deemed most appropriate. Using a sterile surgical marker, the appropriate advancement flap was drawn incorporating the defect and placing the expected incisions within the relaxed skin tension lines where possible. The area thus outlined was incised deep to adipose tissue with a #15 scalpel blade. The skin margins were undermined to an appropriate distance in all directions utilizing iris scissors. Following this, the designed flap was advanced and carried over into the primary defect and sutured into place.
Alar Island Pedicle Flap Text: The defect edges were debeveled with a #15 scalpel blade. Given the location of the defect, shape of the defect and the proximity to the alar rim an island pedicle advancement flap was deemed most appropriate. Using a sterile surgical marker, an appropriate advancement flap was drawn incorporating the defect, outlining the appropriate donor tissue and placing the expected incisions within the nasal ala running parallel to the alar rim. The area thus outlined was incised with a #15 scalpel blade. The skin margins were undermined minimally to an appropriate distance in all directions around the primary defect and laterally outward around the island pedicle utilizing iris scissors.  There was minimal undermining beneath the pedicle flap. Following this, the designed flap was carried over into the primary defect and sutured into place.
Localized Dermabrasion With Sand Papertext: The patient was draped in routine manner.  Localized dermabrasion using sterile sand paper was performed in routine manner to papillary dermis. This spot dermabrasion is being performed to complete skin cancer reconstruction. It also will eliminate the other sun damaged precancerous cells that are known to be part of the regional effect of a lifetime's worth of sun exposure. This localized dermabrasion is therapeutic and should not be considered cosmetic in any regard.
Bcc Histology Text: There were numerous aggregates of basaloid cells.
Orbicularis Oris Muscle Flap Text: The defect edges were debeveled with a #15 scalpel blade.  Given that the defect affected the competency of the oral sphincter an orbicularis oris muscle flap was deemed most appropriate to restore this competency and normal muscle function.  Using a sterile surgical marker, an appropriate flap was drawn incorporating the defect. The area thus outlined was incised with a #15 scalpel blade. Following this, the designed flap was carried over into the primary defect and sutured into place.
Mauc Instructions: By selecting yes to the question below the MAUC number will be added into the note.  This will be calculated automatically based on the diagnosis chosen, the size entered, the body zone selected (H,M,L) and the specific indications you chose. You will also have the option to override the Mohs AUC if you disagree with the automatically calculated number and this option is found in the Case Summary tab.
Graft Donor Site Bandage (Optional-Leave Blank If You Don't Want In Note): Steri-strips and a pressure bandage were applied to the donor site.
Abbe Flap (Lower To Upper Lip) Text: The defect of the upper lip was assessed and measured.  Given the location and size of the defect, an Abbe flap was deemed most appropriate. Using a sterile surgical marker, an appropriate Abbe flap was measured and drawn on the lower lip. Local anesthesia was then infiltrated. A scalpel was then used to incise the upper lip through and through the skin, vermilion, muscle and mucosa, leaving the flap pedicled on the opposite side.  The flap was then rotated and transferred to the lower lip defect.  The flap was then sutured into place with a three layer technique, closing the orbicularis oris muscle layer with subcutaneous buried sutures, followed by a mucosal layer and an epidermal layer.
Lazy S Complex Repair Preamble Text (Leave Blank If You Do Not Want): Extensive wide undermining was performed.
Dermal Autograft Text: The defect edges were debeveled with a #15 scalpel blade. Given the location of the defect, shape of the defect and the proximity to free margins a dermal autograft was deemed most appropriate. Using a sterile surgical marker, the primary defect shape was transferred to the donor site. The area thus outlined was incised deep to adipose tissue with a #15 scalpel blade.  The harvested graft was then trimmed of adipose and epidermal tissue until only dermis was left.  The skin graft was then placed in the primary defect and oriented appropriately.
Retention Suture Text: Retention sutures were placed to support the closure and prevent dehiscence.
Advancement Flap (Double) Text: The defect edges were debeveled with a #15 scalpel blade. Given the location of the defect and the proximity to free margins a double advancement flap was deemed most appropriate. Using a sterile surgical marker, the appropriate advancement flaps were drawn incorporating the defect and placing the expected incisions within the relaxed skin tension lines where possible. The area thus outlined was incised deep to adipose tissue with a #15 scalpel blade. The skin margins were undermined to an appropriate distance in all directions utilizing iris scissors. Following this, the designed flaps were advanced and carried over into the primary defect and sutured into place.
Tumor Debulked?: not debulked
O-L Flap Text: The defect edges were debeveled with a #15 scalpel blade. Given the location of the defect, shape of the defect and the proximity to free margins an O-L flap was deemed most appropriate. Using a sterile surgical marker, an appropriate advancement flap was drawn incorporating the defect and placing the expected incisions within the relaxed skin tension lines where possible. The area thus outlined was incised deep to adipose tissue with a #15 scalpel blade. The skin margins were undermined to an appropriate distance in all directions utilizing iris scissors. Following this, the designed flap was advanced and carried over into the primary defect and sutured into place.
Which Eyelid Repair Cpt Are You Using?: 18852
Secondary Intention Text (Leave Blank If You Do Not Want): The defect will heal with secondary intention.
Hemostasis: Electrocautery
O-T Advancement Flap Text: The defect edges were debeveled with a #15 scalpel blade. Given the location of the defect, shape of the defect and the proximity to free margins an O-T advancement flap was deemed most appropriate. Using a sterile surgical marker, an appropriate advancement flap was drawn incorporating the defect and placing the expected incisions within the relaxed skin tension lines where possible. The area thus outlined was incised deep to adipose tissue with a #15 scalpel blade. The skin margins were undermined to an appropriate distance in all directions utilizing iris scissors. Following this, the designed flap was advanced and carried over into the primary defect and sutured into place.
Length To Time In Minutes Device Was In Place: 10
Flip-Flop Flap Text: The defect edges were debeveled with a #15 blade scalpel.  Given the location of the defect and the proximity to free margins a flip-flop flap was deemed most appropriate. Using a sterile surgical marker, the appropriate flap was drawn incorporating the defect and placing the expected incisions between the helical rim and antihelix where possible.  The area thus outlined was incised through and through with a #15 scalpel blade. Following this, the designed flap was carried over into the primary defect and sutured into place.
Pinch Graft Text: The defect edges were debeveled with a #15 scalpel blade. Given the location of the defect, shape of the defect and the proximity to free margins a pinch graft was deemed most appropriate. Using a sterile surgical marker, the primary defect shape was transferred to the donor site. The area thus outlined was incised deep to adipose tissue with a #15 scalpel blade.  The harvested graft was then trimmed of adipose tissue until only dermis and epidermis was left. The skin graft was then placed in the primary defect and oriented appropriately.
Consent (Temporal Branch)/Introductory Paragraph: The rationale for Mohs was explained to the patient and consent was obtained. The risks, benefits and alternatives to therapy were discussed in detail. Specifically, the risks of damage to the temporal branch of the facial nerve, infection, scarring, bleeding, prolonged wound healing, incomplete removal, allergy to anesthesia, and recurrence were addressed. Prior to the procedure, the treatment site was clearly identified and confirmed by the patient. All components of Universal Protocol/PAUSE Rule completed.
Advancement-Rotation Flap Text: The defect edges were debeveled with a #15 scalpel blade. Given the location of the defect, shape of the defect and the proximity to free margins an advancement-rotation flap was deemed most appropriate. Using a sterile surgical marker, an appropriate flap was drawn incorporating the defect and placing the expected incisions within the relaxed skin tension lines where possible. The area thus outlined was incised deep to adipose tissue with a #15 scalpel blade. The skin margins were undermined to an appropriate distance in all directions utilizing iris scissors. Following this, the designed flap was carried over into the primary defect and sutured into place.
Number Of Hemigard Strips Per Side: 1
Nostril Rim Text: The closure involved the nostril rim.
Intermediate Repair And Flap Additional Text (Will Appearing After The Standard Complex Repair Text): The intermediate repair was not sufficient to completely close the primary defect. The remaining additional defect was repaired with the flap mentioned below.
Burow's Graft Text: The defect edges were debeveled with a #15 scalpel blade. Given the location of the defect, shape of the defect, the proximity to free margins and the presence of a standing cone deformity a Burow's skin graft was deemed most appropriate. The standing cone was removed and this tissue was then trimmed to the shape of the primary defect. The adipose tissue was also removed until only dermis and epidermis were left.  The skin graft was then placed in the primary defect and oriented appropriately.
Double O-Z Plasty Text: The defect edges were debeveled with a #15 scalpel blade. Given the location of the defect, shape of the defect and the proximity to free margins a Double O-Z plasty (double transposition flap) was deemed most appropriate. Using a sterile surgical marker, the appropriate transposition flaps were drawn incorporating the defect and placing the expected incisions within the relaxed skin tension lines where possible. The area thus outlined was incised deep to adipose tissue with a #15 scalpel blade. The skin margins were undermined to an appropriate distance in all directions utilizing iris scissors. Hemostasis was achieved with electrocautery. The flaps were then transposed and carried over into place, one clockwise and the other counterclockwise, and anchored with interrupted buried subcutaneous sutures.
Same Histology In Subsequent Stages Text: The pattern and morphology of the tumor is as described in the first stage.
Split-Thickness Skin Graft Text: The defect edges were debeveled with a #15 scalpel blade. Given the location of the defect, shape of the defect and the proximity to free margins a split thickness skin graft was deemed most appropriate. Using a sterile surgical marker, the primary defect shape was transferred to the donor site. The split thickness graft was then harvested.  The skin graft was then placed in the primary defect and oriented appropriately.
Home Suture Removal Text: Patient was provided instructions on removing sutures and will remove their sutures at home.  If they have any questions or difficulties they will call the office.
Rotation Flap Text: The defect edges were debeveled with a #15 scalpel blade. Given the location of the defect, shape of the defect and the proximity to free margins a rotation flap was deemed most appropriate. Using a sterile surgical marker, an appropriate rotation flap was drawn incorporating the defect and placing the expected incisions within the relaxed skin tension lines where possible. The area thus outlined was incised deep to adipose tissue with a #15 scalpel blade. The skin margins were undermined to an appropriate distance in all directions utilizing iris scissors. Following this, the designed flap was carried over into the primary defect and sutured into place.
No Repair - Repaired With Adjacent Surgical Defect Text (Leave Blank If You Do Not Want): After obtaining clear surgical margins the defect was repaired concurrently with another surgical defect which was in close approximation.
Tumor Depth: Less than 6mm from granular layer and no invasion beyond the subcutaneous fat
Rhomboid Transposition Flap Text: The defect edges were debeveled with a #15 scalpel blade. Given the location of the defect and the proximity to free margins a rhomboid transposition flap was deemed most appropriate. Using a sterile surgical marker, an appropriate rhomboid flap was drawn incorporating the defect. The area thus outlined was incised deep to adipose tissue with a #15 scalpel blade. The skin margins were undermined to an appropriate distance in all directions utilizing iris scissors. Following this, the designed flap was carried over into the primary defect and sutured into place.
Is There Documentation In The Chart Showing Discussion Of Changes With Another Physician?: Please Select the Appropriate Response
Presence Of Scar Tissue (For Histology): absent
H Plasty Text: Given the location of the defect, shape of the defect and the proximity to free margins a H-plasty was deemed most appropriate for repair. Using a sterile surgical marker, the appropriate advancement arms of the H-plasty were drawn incorporating the defect and placing the expected incisions within the relaxed skin tension lines where possible. The area thus outlined was incised deep to adipose tissue with a #15 scalpel blade. The skin margins were undermined to an appropriate distance in all directions utilizing iris scissors.  The opposing advancement arms were then advanced and carried over into place in opposite direction and anchored with interrupted buried subcutaneous sutures.
Information: Selecting Yes will display possible errors in your note based on the variables you have selected. This validation is only offered as a suggestion for you. PLEASE NOTE THAT THE VALIDATION TEXT WILL BE REMOVED WHEN YOU FINALIZE YOUR NOTE. IF YOU WANT TO FAX A PRELIMINARY NOTE YOU WILL NEED TO TOGGLE THIS TO 'NO' IF YOU DO NOT WANT IT IN YOUR FAXED NOTE.
Consent (Marginal Mandibular)/Introductory Paragraph: The rationale for Mohs was explained to the patient and consent was obtained. The risks, benefits and alternatives to therapy were discussed in detail. Specifically, the risks of damage to the marginal mandibular branch of the facial nerve, infection, scarring, bleeding, prolonged wound healing, incomplete removal, allergy to anesthesia, and recurrence were addressed. Prior to the procedure, the treatment site was clearly identified and confirmed by the patient. All components of Universal Protocol/PAUSE Rule completed.
Nasalis-Muscle-Based Myocutaneous Island Pedicle Flap Text: Using a #15 blade, an incision was made around the donor flap to the level of the nasalis muscle. Wide lateral undermining was then performed in both the subcutaneous plane above the nasalis muscle, and in a submuscular plane just above periosteum. This allowed the formation of a free nasalis muscle axial pedicle (based on the angular artery) which was still attached to the actual cutaneous flap, increasing its mobility and vascular viability. Hemostasis was obtained with pinpoint electrocoagulation. The flap was mobilized into position and the pivotal anchor points positioned and stabilized with buried interrupted sutures. Subcutaneous and dermal tissues were closed in a multilayered fashion with sutures. Tissue redundancies were excised, and the epidermal edges were apposed without significant tension and sutured with sutures.
Double O-Z Flap Text: The defect edges were debeveled with a #15 scalpel blade. Given the location of the defect, shape of the defect and the proximity to free margins a Double O-Z flap was deemed most appropriate. Using a sterile surgical marker, an appropriate transposition flap was drawn incorporating the defect and placing the expected incisions within the relaxed skin tension lines where possible. The area thus outlined was incised deep to adipose tissue with a #15 scalpel blade. The skin margins were undermined to an appropriate distance in all directions utilizing iris scissors. Following this, the designed flap was carried over into the primary defect and sutured into place.
Island Pedicle Flap With Canthal Suspension Text: The defect edges were debeveled with a #15 scalpel blade. Given the location of the defect, shape of the defect and the proximity to free margins an island pedicle advancement flap was deemed most appropriate. Using a sterile surgical marker, an appropriate advancement flap was drawn incorporating the defect, outlining the appropriate donor tissue and placing the expected incisions within the relaxed skin tension lines where possible. The area thus outlined was incised deep to adipose tissue with a #15 scalpel blade. The skin margins were undermined to an appropriate distance in all directions around the primary defect and laterally outward around the island pedicle utilizing iris scissors.  There was minimal undermining beneath the pedicle flap. A suspension suture was placed in the canthal tendon to prevent tension and prevent ectropion. Following this, the designed flap was placed into the primary defect and sutured into place.
Surgeon/Pathologist Verbiage (Will Incorporate Name Of Surgeon From Intro If Not Blank): operated in two distinct and integrated capacities as the surgeon and pathologist.
A-T Advancement Flap Text: The defect edges were debeveled with a #15 scalpel blade. Given the location of the defect, shape of the defect and the proximity to free margins an A-T advancement flap was deemed most appropriate. Using a sterile surgical marker, an appropriate advancement flap was drawn incorporating the defect and placing the expected incisions within the relaxed skin tension lines where possible. The area thus outlined was incised deep to adipose tissue with a #15 scalpel blade. The skin margins were undermined to an appropriate distance in all directions utilizing iris scissors. Following this, the designed flap was advanced and carried over into the primary defect and sutured into place.
Medical Necessity Statement: Based on my medical judgement, Mohs surgery is the most appropriate treatment for this cancer compared to other treatments.
Cheek Interpolation Flap Text: A decision was made to reconstruct the defect utilizing an interpolation axial flap and a staged reconstruction.  A telfa template was made of the defect.  This telfa template was then used to outline the Cheek Interpolation flap.  The donor area for the pedicle flap was then injected with anesthesia.  The flap was excised through the skin and subcutaneous tissue down to the layer of the underlying musculature.  The interpolation flap was carefully excised within this deep plane to maintain its blood supply.  The edges of the donor site were undermined.   The donor site was closed in a primary fashion.  The pedicle was then rotated into position and sutured.  Once the tube was sutured into place, adequate blood supply was confirmed with blanching and refill.  The pedicle was then wrapped with xeroform gauze and dressed appropriately with a telfa and gauze bandage to ensure continued blood supply and protect the attached pedicle.
Mart-1 - Positive Histology Text: MART-1 staining demonstrates areas of higher density and clustering of melanocytes with Pagetoid spread upwards within the epidermis. The surgical margins are positive for tumor cells.
Complex Repair And Graft Additional Text (Will Appearing After The Standard Complex Repair Text): The complex repair was not sufficient to completely close the primary defect. The remaining additional defect was repaired with the graft mentioned below.
Graft Cartilage Fenestration Text: The cartilage was fenestrated with a 2mm punch biopsy to help facilitate graft survival and healing.
Nasal Turnover Hinge Flap Text: The defect edges were debeveled with a #15 scalpel blade.  Given the size, depth, location of the defect and the defect being full thickness a nasal turnover hinge flap was deemed most appropriate. Using a sterile surgical marker, an appropriate hinge flap was drawn incorporating the defect. The area thus outlined was incised with a #15 scalpel blade. The flap was designed to recreate the nasal mucosal lining and the alar rim. The skin margins were undermined to an appropriate distance in all directions utilizing iris scissors. Following this, the designed flap was carried over into the primary defect and sutured into place
Abbe Flap (Upper To Lower Lip) Text: The defect of the lower lip was assessed and measured.  Given the location and size of the defect, an Abbe flap was deemed most appropriate. Using a sterile surgical marker, an appropriate Abbe flap was measured and drawn on the upper lip. Local anesthesia was then infiltrated.  A scalpel was then used to incise the upper lip through and through the skin, vermilion, muscle and mucosa, leaving the flap pedicled on the opposite side.  The flap was then rotated and transferred to the lower lip defect.  The flap was then sutured into place with a three layer technique, closing the orbicularis oris muscle layer with subcutaneous buried sutures, followed by a mucosal layer and an epidermal layer.
Hatchet Flap Text: The defect edges were debeveled with a #15 scalpel blade. Given the location of the defect, shape of the defect and the proximity to free margins a hatchet flap was deemed most appropriate. Using a sterile surgical marker, an appropriate hatchet flap was drawn incorporating the defect and placing the expected incisions within the relaxed skin tension lines where possible. The area thus outlined was incised deep to adipose tissue with a #15 scalpel blade. The skin margins were undermined to an appropriate distance in all directions utilizing iris scissors. Following this, the designed flap was carried over into the primary defect and sutured into place.
Primary Defect Width In Cm (Final Defect Size - Required For Flaps/Grafts): 0.8
Localized Dermabrasion With Wire Brush Text: The patient was draped in routine manner.  Localized dermabrasion using 3 x 17 mm wire brush was performed in routine manner to papillary dermis. This spot dermabrasion is being performed to complete skin cancer reconstruction. It also will eliminate the other sun damaged precancerous cells that are known to be part of the regional effect of a lifetime's worth of sun exposure. This localized dermabrasion is therapeutic and should not be considered cosmetic in any regard.
Helical Rim Text: The closure involved the helical rim.
Mucosal Advancement Flap Text: Given the location of the defect, shape of the defect and the proximity to free margins a mucosal advancement flap was deemed most appropriate. Incisions were made with a 15 blade scalpel in the appropriate fashion along the cutaneous vermilion border and the mucosal lip. The remaining actinically damaged mucosal tissue was excised.  The mucosal advancement flap was then elevated to the gingival sulcus with care taken to preserve the neurovascular structures and advanced into the primary defect. Care was taken to ensure that precise realignment of the vermilion border was achieved.
Mohs Rapid Report Verbiage: The area of clinically evident tumor was marked with skin marking ink and appropriately hatched.  The initial incision was made following the Mohs approach through the skin.  The specimen was taken to the lab, divided into the necessary number of pieces, chromacoded and processed according to the Mohs protocol.  This was repeated in successive stages until a tumor free defect was achieved.
Double Z Plasty Text: The lesion was extirpated to the level of the fat with a #15 scalpel blade. Given the location of the defect, shape of the defect and the proximity to free margins a double Z-plasty was deemed most appropriate for repair. Using a sterile surgical marker, the appropriate transposition arms of the double Z-plasty were drawn incorporating the defect and placing the expected incisions within the relaxed skin tension lines where possible. The area thus outlined was incised deep to adipose tissue with a #15 scalpel blade. The skin margins were undermined to an appropriate distance in all directions utilizing iris scissors. The opposing transposition arms were then transposed and carried over into place in opposite direction and anchored with interrupted buried subcutaneous sutures.
Bilobed Flap Text: The defect edges were debeveled with a #15 scalpel blade. Given the location of the defect and the proximity to free margins a bilobe flap was deemed most appropriate. Using a sterile surgical marker, an appropriate bilobe flap drawn around the defect. The area thus outlined was incised deep to adipose tissue with a #15 scalpel blade. The skin margins were undermined to an appropriate distance in all directions utilizing iris scissors. Following this, the designed flap was carried over into the primary defect and sutured into place.
Partial Purse String (Simple) Text: Given the location of the defect and the characteristics of the surrounding skin a simple purse string closure was deemed most appropriate.  Undermining was performed circumferentially around the surgical defect.  A purse string suture was then placed and tightened. Wound tension only allowed a partial closure of the circular defect.
Donor Site Anesthesia Type: same as repair anesthesia
Skin Substitute Text: The defect edges were debeveled with a #15 scalpel blade. Given the location of the defect, shape of the defect and the proximity to free margins a skin substitute graft was deemed most appropriate.  The graft material was trimmed to fit the size of the defect. The graft was then placed in the primary defect and oriented appropriately.
Modified Advancement Flap Text: The defect edges were debeveled with a #15 scalpel blade. Given the location of the defect, shape of the defect and the proximity to free margins a modified advancement flap was deemed most appropriate. Using a sterile surgical marker, an appropriate advancement flap was drawn incorporating the defect and placing the expected incisions within the relaxed skin tension lines where possible. The area thus outlined was incised deep to adipose tissue with a #15 scalpel blade. The skin margins were undermined to an appropriate distance in all directions utilizing iris scissors. Following this, the designed flap was advanced and carried over into the primary defect and sutured into place.
Staged Advancement Flap Text: The defect edges were debeveled with a #15 scalpel blade. Given the location of the defect, shape of the defect and the proximity to free margins a staged advancement flap was deemed most appropriate. Using a sterile surgical marker, an appropriate advancement flap was drawn incorporating the defect and placing the expected incisions within the relaxed skin tension lines where possible. The area thus outlined was incised deep to adipose tissue with a #15 scalpel blade. The skin margins were undermined to an appropriate distance in all directions utilizing iris scissors. Following this, the designed flap was carried over into the primary defect and sutured into place.
V-Y Flap Text: The defect edges were debeveled with a #15 scalpel blade. Given the location of the defect, shape of the defect and the proximity to free margins a V-Y flap was deemed most appropriate. Using a sterile surgical marker, an appropriate advancement flap was drawn incorporating the defect and placing the expected incisions within the relaxed skin tension lines where possible. The area thus outlined was incised deep to adipose tissue with a #15 scalpel blade. The skin margins were undermined to an appropriate distance in all directions utilizing iris scissors. Following this, the designed flap was advanced and carried over into the primary defect and sutured into place.
Body Location Override (Optional - Billing Will Still Be Based On Selected Body Map Location If Applicable): Nasal Dorsum
Spiral Flap Text: The defect edges were debeveled with a #15 scalpel blade. Given the location of the defect, shape of the defect and the proximity to free margins a spiral flap was deemed most appropriate. Using a sterile surgical marker, an appropriate rotation flap was drawn incorporating the defect and placing the expected incisions within the relaxed skin tension lines where possible. The area thus outlined was incised deep to adipose tissue with a #15 scalpel blade. The skin margins were undermined to an appropriate distance in all directions utilizing iris scissors. Following this, the designed flap was carried over into the primary defect and sutured into place.
Consent 2/Introductory Paragraph: Mohs surgery was explained to the patient and consent was obtained. The risks, benefits and alternatives to therapy were discussed in detail. Specifically, the risks of infection, scarring, bleeding, prolonged wound healing, incomplete removal, allergy to anesthesia, nerve injury and recurrence were addressed. Prior to the procedure, the treatment site was clearly identified and confirmed by the patient. All components of Universal Protocol/PAUSE Rule completed.
Nasolabial Transposition Flap Text: The defect edges were debeveled with a #15 scalpel blade.  Given the size, depth and location of the defect and the proximity to free margins a nasolabial transposition flap was deemed most appropriate. Using a sterile surgical marker, an appropriate flap was drawn incorporating the defect. The area thus outlined was incised with a #15 scalpel blade. The skin margins were undermined to an appropriate distance in all directions utilizing iris scissors. Following this, the designed flap was carried into the primary defect and sutured into place.
Purse String (Simple) Text: Given the location of the defect and the characteristics of the surrounding skin a purse string closure was deemed most appropriate.  Undermining was performed circumferentially around the surgical defect.  A purse string suture was then placed and tightened.
Mohs Case Number: 
Consent (Ear)/Introductory Paragraph: The rationale for Mohs was explained to the patient and consent was obtained. The risks, benefits and alternatives to therapy were discussed in detail. Specifically, the risks of ear deformity, infection, scarring, bleeding, prolonged wound healing, incomplete removal, allergy to anesthesia, nerve injury and recurrence were addressed. Prior to the procedure, the treatment site was clearly identified and confirmed by the patient. All components of Universal Protocol/PAUSE Rule completed.
Melolabial Interpolation Flap Text: A decision was made to reconstruct the defect utilizing an interpolation axial flap and a staged reconstruction.  A telfa template was made of the defect.  This telfa template was then used to outline the melolabial interpolation flap.  The donor area for the pedicle flap was then injected with anesthesia.  The flap was excised through the skin and subcutaneous tissue down to the layer of the underlying musculature.  The pedicle flap was carefully excised within this deep plane to maintain its blood supply.  The edges of the donor site were undermined.   The donor site was closed in a primary fashion.  The pedicle was then rotated into position and sutured.  Once the tube was sutured into place, adequate blood supply was confirmed with blanching and refill.  The pedicle was then wrapped with xeroform gauze and dressed appropriately with a telfa and gauze bandage to ensure continued blood supply and protect the attached pedicle.
Nasalis Myocutaneous Flap Text: Using a #15 blade, an incision was made around the donor flap to the level of the nasalis muscle. Wide lateral undermining was then performed in both the subcutaneous plane above the nasalis muscle, and in a submuscular plane just above periosteum. This allowed the formation of a free nasalis muscle axial pedicle which was still attached to the actual cutaneous flap, increasing its mobility and vascular viability. Hemostasis was obtained with pinpoint electrocoagulation. The flap was mobilized into position and the pivotal anchor points positioned and stabilized with buried interrupted sutures. Subcutaneous and dermal tissues were closed in a multilayered fashion with sutures. Tissue redundancies were excised, and the epidermal edges were apposed without significant tension and sutured with sutures.
No Residual Tumor Seen Histology Text: There were no malignant cells seen in the sections examined.
Location Indication Override (Is Already Calculated Based On Selected Body Location): Area H
Debridement Text: The wound edges were debrided prior to proceeding with the closure to facilitate wound healing.
O-Z Flap Text: The defect edges were debeveled with a #15 scalpel blade. Given the location of the defect, shape of the defect and the proximity to free margins an O-Z flap was deemed most appropriate. Using a sterile surgical marker, an appropriate transposition flap was drawn incorporating the defect and placing the expected incisions within the relaxed skin tension lines where possible. The area thus outlined was incised deep to adipose tissue with a #15 scalpel blade. The skin margins were undermined to an appropriate distance in all directions utilizing iris scissors. Following this, the designed flap was carried over into the primary defect and sutured into place.
Pain Refusal Text: I offered to prescribe pain medication but the patient refused to take this medication.
Dressing: pressure dressing with telfa
Suturegard Intro: Intraoperative tissue expansion was performed, utilizing the SUTUREGARD device, in order to reduce wound tension.
Depth Of Tumor Invasion (For Histology): tumor not visualized
Double Island Pedicle Flap Text: The defect edges were debeveled with a #15 scalpel blade. Given the location of the defect, shape of the defect and the proximity to free margins a double island pedicle advancement flap was deemed most appropriate. Using a sterile surgical marker, an appropriate advancement flap was drawn incorporating the defect, outlining the appropriate donor tissue and placing the expected incisions within the relaxed skin tension lines where possible. The area thus outlined was incised deep to adipose tissue with a #15 scalpel blade. The skin margins were undermined to an appropriate distance in all directions around the primary defect and laterally outward around the island pedicle utilizing iris scissors.  There was minimal undermining beneath the pedicle flap. Following this, the flap was carried over into the primary defect and sutured into place.
Peng Advancement Flap Text: The defect edges were debeveled with a #15 scalpel blade. Given the location of the defect, shape of the defect and the proximity to free margins a Peng advancement flap was deemed most appropriate. Using a sterile surgical marker, an appropriate advancement flap was drawn incorporating the defect and placing the expected incisions within the relaxed skin tension lines where possible. The area thus outlined was incised deep to adipose tissue with a #15 scalpel blade. The skin margins were undermined to an appropriate distance in all directions utilizing iris scissors. Following this, the designed flap was advanced and carried over into the primary defect and sutured into place.
Subsequent Stages Histo Method Verbiage: Using a similar technique to that described above, a thin layer of tissue was removed from all areas where tumor was visible on the previous stage.  The tissue was again oriented, mapped, dyed, and processed as above.
Epidermal Autograft Text: The defect edges were debeveled with a #15 scalpel blade. Given the location of the defect, shape of the defect and the proximity to free margins an epidermal autograft was deemed most appropriate. Using a sterile surgical marker, the primary defect shape was transferred to the donor site. The epidermal graft was then harvested.  The skin graft was then placed in the primary defect and oriented appropriately.
Partial Purse String (Intermediate) Text: Given the location of the defect and the characteristics of the surrounding skin an intermediate purse string closure was deemed most appropriate.  Undermining was performed circumferentially around the surgical defect.  A purse string suture was then placed and tightened. Wound tension only allowed a partial closure of the circular defect.
Mastoid Interpolation Flap Text: A decision was made to reconstruct the defect utilizing an interpolation axial flap and a staged reconstruction.  A telfa template was made of the defect.  This telfa template was then used to outline the mastoid interpolation flap.  The donor area for the pedicle flap was then injected with anesthesia.  The flap was excised through the skin and subcutaneous tissue down to the layer of the underlying musculature.  The pedicle flap was carefully excised within this deep plane to maintain its blood supply.  The edges of the donor site were undermined.   The donor site was closed in a primary fashion.  The pedicle was then rotated into position and sutured.  Once the tube was sutured into place, adequate blood supply was confirmed with blanching and refill.  The pedicle was then wrapped with xeroform gauze and dressed appropriately with a telfa and gauze bandage to ensure continued blood supply and protect the attached pedicle.
Bi-Rhombic Flap Text: The defect edges were debeveled with a #15 scalpel blade. Given the location of the defect and the proximity to free margins a bi-rhombic flap was deemed most appropriate. Using a sterile surgical marker, an appropriate rhombic flap was drawn incorporating the defect. The area thus outlined was incised deep to adipose tissue with a #15 scalpel blade. The skin margins were undermined to an appropriate distance in all directions utilizing iris scissors. Following this, the designed flap was carried over into the primary defect and sutured into place.
Paramedian Forehead Flap Text: A decision was made to reconstruct the defect utilizing an interpolation axial flap and a staged reconstruction.  A telfa template was made of the defect.  This telfa template was then used to outline the paramedian forehead pedicle flap.  The donor area for the pedicle flap was then injected with anesthesia.  The flap was excised through the skin and subcutaneous tissue down to the layer of the underlying musculature.  The pedicle flap was carefully excised within this deep plane to maintain its blood supply.  The edges of the donor site were undermined.   The donor site was closed in a primary fashion.  The pedicle was then rotated into position and sutured.  Once the tube was sutured into place, adequate blood supply was confirmed with blanching and refill.  The pedicle was then wrapped with xeroform gauze and dressed appropriately with a telfa and gauze bandage to ensure continued blood supply and protect the attached pedicle.
Unna Boot Text: An Unna boot was placed to help immobilize the limb and facilitate more rapid healing.
Advancement Flap (Single) Text: The defect edges were debeveled with a #15 scalpel blade. Given the location of the defect and the proximity to free margins a single advancement flap was deemed most appropriate. Using a sterile surgical marker, an appropriate advancement flap was drawn incorporating the defect and placing the expected incisions within the relaxed skin tension lines where possible. The area thus outlined was incised deep to adipose tissue with a #15 scalpel blade. The skin margins were undermined to an appropriate distance in all directions utilizing iris scissors. Following this, the designed flap was advanced and carried over into the primary defect and sutured into place.
Cartilage Graft Text: The defect edges were debeveled with a #15 scalpel blade. Given the location of the defect, shape of the defect, the fact the defect involved a full thickness cartilage defect a cartilage graft was deemed most appropriate.  An appropriate donor site was identified, cleansed, and anesthetized. The cartilage graft was then harvested and transferred to the recipient site, oriented appropriately and then sutured into place.  The secondary defect was then repaired using a primary closure.
Bcc Infiltrative Histology Text: There were numerous aggregates of basaloid cells demonstrating an infiltrative pattern.
Trilobed Flap Text: The defect edges were debeveled with a #15 scalpel blade. Given the location of the defect and the proximity to free margins a trilobed flap was deemed most appropriate. Using a sterile surgical marker, an appropriate trilobed flap was drawn around the defect. The area thus outlined was incised deep to adipose tissue with a #15 scalpel blade. The skin margins were undermined to an appropriate distance in all directions utilizing iris scissors. Following this, the designed flap was carried into the primary defect and sutured into place.
O-Z Plasty Text: The defect edges were debeveled with a #15 scalpel blade. Given the location of the defect, shape of the defect and the proximity to free margins an O-Z plasty (double transposition flap) was deemed most appropriate. Using a sterile surgical marker, the appropriate transposition flaps were drawn incorporating the defect and placing the expected incisions within the relaxed skin tension lines where possible. The area thus outlined was incised deep to adipose tissue with a #15 scalpel blade. The skin margins were undermined to an appropriate distance in all directions utilizing iris scissors. Hemostasis was achieved with electrocautery. The flaps were then transposed and carried over into place, one clockwise and the other counterclockwise, and anchored with interrupted buried subcutaneous sutures.
Bilateral Rotation Flap Text: The defect edges were debeveled with a #15 scalpel blade. Given the location of the defect, shape of the defect and the proximity to free margins a bilateral rotation flap was deemed most appropriate. Using a sterile surgical marker, an appropriate rotation flap was drawn incorporating the defect and placing the expected incisions within the relaxed skin tension lines where possible. The area thus outlined was incised deep to adipose tissue with a #15 scalpel blade. The skin margins were undermined to an appropriate distance in all directions utilizing iris scissors. Following this, the designed flap was carried over into the primary defect and sutured into place.
Cheek-To-Nose Interpolation Flap Text: A decision was made to reconstruct the defect utilizing an interpolation axial flap and a staged reconstruction.  A telfa template was made of the defect.  This telfa template was then used to outline the Cheek-To-Nose Interpolation flap.  The donor area for the pedicle flap was then injected with anesthesia.  The flap was excised through the skin and subcutaneous tissue down to the layer of the underlying musculature.  The interpolation flap was carefully excised within this deep plane to maintain its blood supply.  The edges of the donor site were undermined.   The donor site was closed in a primary fashion.  The pedicle was then rotated into position and sutured.  Once the tube was sutured into place, adequate blood supply was confirmed with blanching and refill.  The pedicle was then wrapped with xeroform gauze and dressed appropriately with a telfa and gauze bandage to ensure continued blood supply and protect the attached pedicle.
Full Thickness Lip Wedge Repair (Flap) Text: Given the location of the defect and the proximity to free margins a full thickness wedge repair was deemed most appropriate. Using a sterile surgical marker, the appropriate repair was drawn incorporating the defect and placing the expected incisions perpendicular to the vermilion border.  The vermilion border was also meticulously outlined to ensure appropriate reapproximation during the repair.  The area thus outlined was incised through and through with a #15 scalpel blade.  The muscularis and dermis were reaproximated with deep sutures following hemostasis. Care was taken to realign the vermilion border before proceeding with the superficial closure.  Once the vermilion was realigned the superfical and mucosal closure was finished.
Area L Indication Text: Tumors in this location are included in Area L (trunk and extremities).  Mohs surgery is indicated for larger tumors, or tumors with aggressive histologic features, in these anatomic locations.
Posterior Auricular Interpolation Flap Text: A decision was made to reconstruct the defect utilizing an interpolation axial flap and a staged reconstruction.  A telfa template was made of the defect.  This telfa template was then used to outline the posterior auricular interpolation flap.  The donor area for the pedicle flap was then injected with anesthesia.  The flap was excised through the skin and subcutaneous tissue down to the layer of the underlying musculature.  The pedicle flap was carefully excised within this deep plane to maintain its blood supply.  The edges of the donor site were undermined.   The donor site was closed in a primary fashion.  The pedicle was then rotated into position and sutured.  Once the tube was sutured into place, adequate blood supply was confirmed with blanching and refill.  The pedicle was then wrapped with xeroform gauze and dressed appropriately with a telfa and gauze bandage to ensure continued blood supply and protect the attached pedicle.
Hemigard Intro: Due to skin fragility and wound tension, it was decided to use HEMIGARD adhesive retention suture devices to permit a linear closure. The skin was cleaned and dried for a 6cm distance away from the wound. Excessive hair, if present, was removed to allow for adhesion.
Post-Care Instructions: I reviewed with the patient in detail post-care instructions. Patient is not to engage in any heavy lifting, exercise, or swimming for the next 14 days. Should the patient develop any fevers, chills, bleeding, severe pain patient will contact the office immediately.
Mercedes Flap Text: The defect edges were debeveled with a #15 scalpel blade. Given the location of the defect, shape of the defect and the proximity to free margins a Mercedes flap was deemed most appropriate. Using a sterile surgical marker, an appropriate advancement flap was drawn incorporating the defect and placing the expected incisions within the relaxed skin tension lines where possible. The area thus outlined was incised deep to adipose tissue with a #15 scalpel blade. The skin margins were undermined to an appropriate distance in all directions utilizing iris scissors. Following this, the designed flap was advanced and carried over into the primary defect and sutured into place.
Alternatives Discussed Intro (Do Not Add Period): I discussed alternative treatments to Mohs surgery and specifically discussed the risks and benefits of
Eyelid Partial Thickness Repair - 89122: The eyelid defect was partial thickness which required a wedge repair of the eyelid. Special care was taken to ensure that the eyelid margin was realligned when placing sutures.
Bilateral Helical Rim Advancement Flap Text: The defect edges were debeveled with a #15 blade scalpel.  Given the location of the defect and the proximity to free margins (helical rim) a bilateral helical rim advancement flap was deemed most appropriate. Using a sterile surgical marker, the appropriate advancement flaps were drawn incorporating the defect and placing the expected incisions between the helical rim and antihelix where possible.  The area thus outlined was incised through and through with a #15 scalpel blade.  With a skin hook and iris scissors, the flaps were gently and sharply undermined and freed up. Following this, the designed flaps were placed into the primary defect and sutured into place.
Melolabial Transposition Flap Text: The defect edges were debeveled with a #15 scalpel blade. Given the location of the defect and the proximity to free margins a melolabial flap was deemed most appropriate. Using a sterile surgical marker, an appropriate melolabial transposition flap was drawn incorporating the defect. The area thus outlined was incised deep to adipose tissue with a #15 scalpel blade. The skin margins were undermined to an appropriate distance in all directions utilizing iris scissors. Following this, the designed flap was carried over into the primary defect and sutured into place.
Hemigard Postcare Instructions: The HEMIGARD strips are to remain completely dry for at least 5-7 days.
Eye Protection Verbiage: Before proceeding with the stage, a plastic scleral shield was inserted. The globe was anesthetized with a few drops of 1% lidocaine with 1:100,000 epinephrine. Then, an appropriate sized scleral shield was chosen and coated with lacrilube ointment. The shield was gently inserted and left in place for the duration of each stage. After the stage was completed, the shield was gently removed.
Tarsorrhaphy Text: A tarsorrhaphy was performed using Frost sutures.
Consent (Nose)/Introductory Paragraph: The rationale for Mohs was explained to the patient and consent was obtained. The risks, benefits and alternatives to therapy were discussed in detail. Specifically, the risks of nasal deformity, changes in the flow of air through the nose, infection, scarring, bleeding, prolonged wound healing, incomplete removal, allergy to anesthesia, nerve injury and recurrence were addressed. Prior to the procedure, the treatment site was clearly identified and confirmed by the patient. All components of Universal Protocol/PAUSE Rule completed.
Anesthesia Volume In Cc: 6
Detail Level: Detailed
Consent (Scalp)/Introductory Paragraph: The rationale for Mohs was explained to the patient and consent was obtained. The risks, benefits and alternatives to therapy were discussed in detail. Specifically, the risks of changes in hair growth pattern secondary to repair, infection, scarring, bleeding, prolonged wound healing, incomplete removal, allergy to anesthesia, nerve injury and recurrence were addressed. Prior to the procedure, the treatment site was clearly identified and confirmed by the patient. All components of Universal Protocol/PAUSE Rule completed.
Non-Graft Cartilage Fenestration Text: The cartilage was fenestrated with a 2mm punch biopsy to help facilitate healing.
Composite Graft Text: The defect edges were debeveled with a #15 scalpel blade. Given the location of the defect, shape of the defect, the proximity to free margins and the fact the defect was full thickness a composite graft was deemed most appropriate.  The defect was outline and then transferred to the donor site.  A full thickness graft was then excised from the donor site. The graft was then placed in the primary defect, oriented appropriately and then sutured into place.  The secondary defect was then repaired using a primary closure.
Brow Lift Text: A midfrontal incision was made medially to the defect to allow access to the tissues just superior to the left eyebrow. Following careful dissection inferiorly in a supraperiosteal plane to the level of the left eyebrow, several 3-0 monocryl sutures were used to resuspend the eyebrow orbicularis oculi muscular unit to the superior frontal bone periosteum. This resulted in an appropriate reapproximation of static eyebrow symmetry and correction of the left brow ptosis.
Mustarde Flap Text: The defect edges were debeveled with a #15 scalpel blade.  Given the size, depth and location of the defect and the proximity to free margins a Mustarde flap was deemed most appropriate. Using a sterile surgical marker, an appropriate flap was drawn incorporating the defect. The area thus outlined was incised with a #15 scalpel blade. The skin margins were undermined to an appropriate distance in all directions utilizing iris scissors. Following this, the designed flap was carried into the primary defect and sutured into place.
Which Instrument Did You Use For Dermabrasion?: Wire Brush
Xenograft Text: The defect edges were debeveled with a #15 scalpel blade. Given the location of the defect, shape of the defect and the proximity to free margins a xenograft was deemed most appropriate.  The graft was then trimmed to fit the size of the defect.  The graft was then placed in the primary defect and oriented appropriately.
Keystone Flap Text: The defect edges were debeveled with a #15 scalpel blade. Given the location of the defect, shape of the defect a keystone flap was deemed most appropriate. Using a sterile surgical marker, an appropriate keystone flap was drawn incorporating the defect, outlining the appropriate donor tissue and placing the expected incisions within the relaxed skin tension lines where possible. The area thus outlined was incised deep to adipose tissue with a #15 scalpel blade. The skin margins were undermined to an appropriate distance in all directions around the primary defect and laterally outward around the flap utilizing iris scissors. Following this, the designed flap was carried into the primary defect and sutured into place.
Dorsal Nasal Flap Text: The defect edges were debeveled with a #15 scalpel blade. Given the location of the defect and the proximity to free margins a dorsal nasal flap was deemed most appropriate. Using a sterile surgical marker, an appropriate dorsal nasal flap was drawn around the defect. The area thus outlined was incised deep to adipose tissue with a #15 scalpel blade. The skin margins were undermined to an appropriate distance in all directions utilizing iris scissors. Following this, the designed flap was carried into the primary defect and sutured into place.
Ear Star Wedge Flap Text: The defect edges were debeveled with a #15 blade scalpel.  Given the location of the defect and the proximity to free margins (helical rim) an ear star wedge flap was deemed most appropriate. Using a sterile surgical marker, the appropriate flap was drawn incorporating the defect and placing the expected incisions between the helical rim and antihelix where possible.  The area thus outlined was incised through and through with a #15 scalpel blade. Following this, the designed flap was carried over into the primary defect and sutured into place.
Interpolation Flap Text: A decision was made to reconstruct the defect utilizing an interpolation axial flap and a staged reconstruction.  A telfa template was made of the defect.  This telfa template was then used to outline the interpolation flap.  The donor area for the pedicle flap was then injected with anesthesia.  The flap was excised through the skin and subcutaneous tissue down to the layer of the underlying musculature.  The interpolation flap was carefully excised within this deep plane to maintain its blood supply.  The edges of the donor site were undermined.   The donor site was closed in a primary fashion.  The pedicle was then rotated into position and sutured.  Once the tube was sutured into place, adequate blood supply was confirmed with blanching and refill.  The pedicle was then wrapped with xeroform gauze and dressed appropriately with a telfa and gauze bandage to ensure continued blood supply and protect the attached pedicle.
Adjacent Tissue Transfer Text: The defect edges were debeveled with a #15 scalpel blade. Given the location of the defect and the proximity to free margins an adjacent tissue transfer was deemed most appropriate. Using a sterile surgical marker, an appropriate flap was drawn incorporating the defect and placing the expected incisions within the relaxed skin tension lines where possible. The area thus outlined was incised deep to adipose tissue with a #15 scalpel blade. The skin margins were undermined to an appropriate distance in all directions utilizing iris scissors and carried over to close the primary defect.
Estimated Blood Loss (Cc): minimal
Area H Indication Text: Tumors in this location are included in Area H (eyelids, eyebrows, nose, lips, chin, ear, pre-auricular, post-auricular, temple, genitalia, hands, feet, ankles and areola).  Tissue conservation is critical in these anatomic locations.
Closure 2 Information: This tab is for additional flaps and grafts, including complex repair and grafts and complex repair and flaps. You can also specify a different location for the additional defect, if the location is the same you do not need to select a new one. We will insert the automated text for the repair you select below just as we do for solitary flaps and grafts. Please note that at this time if you select a location with a different insurance zone you will need to override the ICD10 and CPT if appropriate.
Ear Wedge Repair Text: A wedge excision was completed by carrying down an excision through the full thickness of the ear and cartilage with an inward facing Burow's triangle. The wound was then closed in a layered fashion.
Intermediate Repair And Graft Additional Text (Will Appearing After The Standard Complex Repair Text): The intermediate repair was not sufficient to completely close the primary defect. The remaining additional defect was repaired with the graft mentioned below.
Mohs Method Verbiage: An incision at a 45 degree angle following the standard Mohs approach was done and the specimen was harvested as a microscopic controlled layer.

## 2025-01-22 ENCOUNTER — APPOINTMENT (OUTPATIENT)
Age: 65
Setting detail: DERMATOLOGY
End: 2025-01-22

## 2025-01-22 PROBLEM — C44.529 SQUAMOUS CELL CARCINOMA OF SKIN OF OTHER PART OF TRUNK: Status: ACTIVE | Noted: 2025-01-22

## 2025-01-22 PROCEDURE — 13101 CMPLX RPR TRUNK 2.6-7.5 CM: CPT

## 2025-01-22 PROCEDURE — ? REPAIR NOTE

## 2025-01-22 NOTE — PROCEDURE: REPAIR NOTE
Deep Sutures: 4-0 Polysorb
Non-Graft Cartilage Fenestration Text: The cartilage was fenestrated with a 2mm punch biopsy to help facilitate healing.
Referred To Mid-Level For Closure Text (Leave Blank If You Do Not Want): After obtaining clear surgical margins the patient was sent to a mid-level provider for surgical repair.  The patient understands they will receive post-surgical care and follow-up from the mid-level provider.
Purse String (Simple) Text: Given the location of the defect and the characteristics of the surrounding skin a purse string closure was deemed most appropriate.  Undermining was performed circumferentially around the surgical defect.  A purse string suture was then placed and tightened.
Complex Repair Preamble Text (Leave Blank If You Do Not Want): Extensive wide undermining was performed.
Hemigard Postcare Instructions: The HEMIGARD strips are to remain completely dry for at least 5-7 days.
Zygomaticofacial Flap Text: Given the location of the defect, shape of the defect and the proximity to free margins a zygomaticofacial flap was deemed most appropriate for repair. Using a sterile surgical marker, the appropriate flap was drawn incorporating the defect and placing the expected incisions within the relaxed skin tension lines where possible. The area thus outlined was incised deep to adipose tissue with a #15 scalpel blade with preservation of a vascular pedicle.  The skin margins were undermined to an appropriate distance in all directions utilizing iris scissors. The flap was then carried over into the defect and anchored with interrupted buried subcutaneous sutures.
Bilateral Helical Rim Advancement Flap Text: The defect edges were debeveled with a #15 blade scalpel.  Given the location of the defect and the proximity to free margins (helical rim) a bilateral helical rim advancement flap was deemed most appropriate. Using a sterile surgical marker, the appropriate advancement flaps were drawn incorporating the defect and placing the expected incisions between the helical rim and antihelix where possible.  The area thus outlined was incised through and through with a #15 scalpel blade.  With a skin hook and iris scissors, the flaps were gently and sharply undermined and freed up. Following this, the designed flaps were placed into the primary defect and sutured into place.
Paramedian Forehead Flap Text: A decision was made to reconstruct the defect utilizing an interpolation axial flap and a staged reconstruction.  A telfa template was made of the defect.  This telfa template was then used to outline the paramedian forehead pedicle flap.  The donor area for the pedicle flap was then injected with anesthesia.  The flap was excised through the skin and subcutaneous tissue down to the layer of the underlying musculature.  The pedicle flap was carefully excised within this deep plane to maintain its blood supply.  The edges of the donor site were undermined.   The donor site was closed in a primary fashion.  The pedicle was then rotated into position and sutured.  Once the tube was sutured into place, adequate blood supply was confirmed with blanching and refill.  The pedicle was then wrapped with xeroform gauze and dressed appropriately with a telfa and gauze bandage to ensure continued blood supply and protect the attached pedicle.
Validate Note Data (See Information Below): Yes
Melolabial Interpolation Flap Division And Inset Text: Division and inset of the melolabial interpolation flap was performed to achieve optimal aesthetic result, restore normal anatomic appearance and avoid distortion of normal anatomy, expedite and facilitate wound healing, achieve optimal functional result and because linear closure either not possible or would produce suboptimal result. The patient was prepped and draped in the usual manner. The pedicle was infiltrated with local anesthesia. The pedicle was sectioned with a #15 blade. The pedicle was de-bulked and trimmed to match the shape of the defect. Hemostasis was achieved. The flap donor site and free margin of the flap were secured with deep buried sutures and the wound edges were re-approximated.
Cartilage Graft Text: The defect edges were debeveled with a #15 scalpel blade. Given the location of the defect, shape of the defect, the fact the defect involved a full thickness cartilage defect a cartilage graft was deemed most appropriate.  An appropriate donor site was identified, cleansed, and anesthetized. The cartilage graft was then harvested and transferred to the recipient site, oriented appropriately and then sutured into place.  The secondary defect was then repaired using a primary closure.
Complex Requirements: Debridement Of Wound Edges?: No
Melolabial Transposition Flap Text: The defect edges were debeveled with a #15 scalpel blade. Given the location of the defect and the proximity to free margins a melolabial flap was deemed most appropriate. Using a sterile surgical marker, an appropriate melolabial transposition flap was drawn incorporating the defect. The area thus outlined was incised deep to adipose tissue with a #15 scalpel blade. The skin margins were undermined to an appropriate distance in all directions utilizing iris scissors. Following this, the designed flap was carried over into the primary defect and sutured into place.
Closure 2 Information: This tab is for additional flaps and grafts, including complex repair and grafts and complex repair and flaps. You can also specify a different location for the additional defect, if the location is the same you do not need to select a new one. We will insert the automated text for the repair you select below just as we do for solitary flaps and grafts. Please note that at this time if you select a location with a different insurance zone you will need to override the ICD10 and CPT if appropriate.
Eyelid Full Thickness Repair - 62279: The eyelid defect was full thickness which required a wedge repair of the eyelid. Special care was taken to ensure that the eyelid margin was realligned when placing sutures.
Nasalis Myocutaneous Flap Text: Using a #15 blade, an incision was made around the donor flap to the level of the nasalis muscle. Wide lateral undermining was then performed in both the subcutaneous plane above the nasalis muscle, and in a submuscular plane just above periosteum. This allowed the formation of a free nasalis muscle axial pedicle which was still attached to the actual cutaneous flap, increasing its mobility and vascular viability. Hemostasis was obtained with pinpoint electrocoagulation. The flap was mobilized into position and the pivotal anchor points positioned and stabilized with buried interrupted sutures. Subcutaneous and dermal tissues were closed in a multilayered fashion with sutures. Tissue redundancies were excised, and the epidermal edges were apposed without significant tension and sutured with sutures.
Post-Care Instructions: I reviewed with the patient in detail post-care instructions. Patient is not to engage in any heavy lifting, exercise, or swimming for the next 14 days. Should the patient develop any fevers, chills, bleeding, severe pain patient will contact the office immediately.
Double O-Z Plasty Text: The defect edges were debeveled with a #15 scalpel blade. Given the location of the defect, shape of the defect and the proximity to free margins a Double O-Z plasty (double transposition flap) was deemed most appropriate. Using a sterile surgical marker, the appropriate transposition flaps were drawn incorporating the defect and placing the expected incisions within the relaxed skin tension lines where possible. The area thus outlined was incised deep to adipose tissue with a #15 scalpel blade. The skin margins were undermined to an appropriate distance in all directions utilizing iris scissors. Hemostasis was achieved with electrocautery. The flaps were then transposed and carried over into place, one clockwise and the other counterclockwise, and anchored with interrupted buried subcutaneous sutures.
Rhomboid Transposition Flap Text: The defect edges were debeveled with a #15 scalpel blade. Given the location of the defect and the proximity to free margins a rhomboid transposition flap was deemed most appropriate. Using a sterile surgical marker, an appropriate rhomboid flap was drawn incorporating the defect. The area thus outlined was incised deep to adipose tissue with a #15 scalpel blade. The skin margins were undermined to an appropriate distance in all directions utilizing iris scissors. Following this, the designed flap was carried over into the primary defect and sutured into place.
Debridement Text: The wound edges were debrided prior to proceeding with the closure to facilitate wound healing.
Preparation Of Recipient Site - Flap: The eschar was removed surgically with sharp dissection to facilitate appropriate wound healing of the following adjacent tissue rearrangement.
Partial Purse String (Simple) Text: Given the location of the defect and the characteristics of the surrounding skin a simple purse string closure was deemed most appropriate.  Undermining was performed circumferentially around the surgical defect.  A purse string suture was then placed and tightened. Wound tension only allowed a partial closure of the circular defect.
Spiral Flap Text: The defect edges were debeveled with a #15 scalpel blade. Given the location of the defect, shape of the defect and the proximity to free margins a spiral flap was deemed most appropriate. Using a sterile surgical marker, an appropriate rotation flap was drawn incorporating the defect and placing the expected incisions within the relaxed skin tension lines where possible. The area thus outlined was incised deep to adipose tissue with a #15 scalpel blade. The skin margins were undermined to an appropriate distance in all directions utilizing iris scissors. Following this, the designed flap was carried over into the primary defect and sutured into place.
Repair Type: Complex Repair
Abbe Flap (Lower To Upper Lip) Text: The defect of the upper lip was assessed and measured.  Given the location and size of the defect, an Abbe flap was deemed most appropriate. Using a sterile surgical marker, an appropriate Abbe flap was measured and drawn on the lower lip. Local anesthesia was then infiltrated. A scalpel was then used to incise the upper lip through and through the skin, vermilion, muscle and mucosa, leaving the flap pedicled on the opposite side.  The flap was then rotated and transferred to the lower lip defect.  The flap was then sutured into place with a three layer technique, closing the orbicularis oris muscle layer with subcutaneous buried sutures, followed by a mucosal layer and an epidermal layer.
Bilobed Flap Text: The defect edges were debeveled with a #15 scalpel blade. Given the location of the defect and the proximity to free margins a bilobe flap was deemed most appropriate. Using a sterile surgical marker, an appropriate bilobe flap drawn around the defect. The area thus outlined was incised deep to adipose tissue with a #15 scalpel blade. The skin margins were undermined to an appropriate distance in all directions utilizing iris scissors. Following this, the designed flap was carried over into the primary defect and sutured into place.
Paramedian Forehead Flap Division And Inset Text: Division and inset of the paramedian forehead flap was performed to achieve optimal aesthetic result, restore normal anatomic appearance and avoid distortion of normal anatomy, expedite and facilitate wound healing, achieve optimal functional result and because linear closure either not possible or would produce suboptimal result. The patient was prepped and draped in the usual manner. The pedicle was infiltrated with local anesthesia. The pedicle was sectioned with a #15 blade. The pedicle was de-bulked and trimmed to match the shape of the defect. Hemostasis was achieved. The flap donor site and free margin of the flap were secured with deep buried sutures and the wound edges were re-approximated.
Epidermal Autograft Text: The defect edges were debeveled with a #15 scalpel blade. Given the location of the defect, shape of the defect and the proximity to free margins an epidermal autograft was deemed most appropriate. Using a sterile surgical marker, the primary defect shape was transferred to the donor site. The epidermal graft was then harvested.  The skin graft was then placed in the primary defect and oriented appropriately.
Preparation Of Recipient Site - Flap Takedown: The eschar and granulation tissue was removed surgically with sharp dissection to facilitate appropriate healing after division and inset of the proximal and distal interpolation flap.
Localized Dermabrasion With Wire Brush Text: The patient was draped in routine manner.  Localized dermabrasion using 3 x 17 mm wire brush was performed in routine manner to papillary dermis. This spot dermabrasion is being performed to complete skin cancer reconstruction. It also will eliminate the other sun damaged precancerous cells that are known to be part of the regional effect of a lifetime's worth of sun exposure. This localized dermabrasion is therapeutic and should not be considered cosmetic in any regard.
Ear Star Wedge Flap Text: The defect edges were debeveled with a #15 blade scalpel.  Given the location of the defect and the proximity to free margins (helical rim) an ear star wedge flap was deemed most appropriate. Using a sterile surgical marker, the appropriate flap was drawn incorporating the defect and placing the expected incisions between the helical rim and antihelix where possible.  The area thus outlined was incised through and through with a #15 scalpel blade. Following this, the designed flap was carried over into the primary defect and sutured into place.
Modified Advancement Flap Text: The defect edges were debeveled with a #15 scalpel blade. Given the location of the defect, shape of the defect and the proximity to free margins a modified advancement flap was deemed most appropriate. Using a sterile surgical marker, an appropriate advancement flap was drawn incorporating the defect and placing the expected incisions within the relaxed skin tension lines where possible. The area thus outlined was incised deep to adipose tissue with a #15 scalpel blade. The skin margins were undermined to an appropriate distance in all directions utilizing iris scissors. Following this, the designed flap was advanced and carried over into the primary defect and sutured into place.
Distance Of Undermining In Cm (Required): 2
Orbicularis Oris Muscle Flap Text: The defect edges were debeveled with a #15 scalpel blade.  Given that the defect affected the competency of the oral sphincter an orbicularis oris muscle flap was deemed most appropriate to restore this competency and normal muscle function.  Using a sterile surgical marker, an appropriate flap was drawn incorporating the defect. The area thus outlined was incised with a #15 scalpel blade. Following this, the designed flap was carried over into the primary defect and sutured into place.
Manual Repair Warning Statement: We plan on removing the manually selected variable below in favor of our much easier automatic structured text blocks found in the previous tab. We decided to do this to help make the flow better and give you the full power of structured data. Manual selection is never going to be ideal in our platform and I would encourage you to avoid using manual selection from this point on, especially since I will be sunsetting this feature. It is important that you do one of two things with the customized text below. First, you can save all of the text in a word file so you can have it for future reference. Second, transfer the text to the appropriate area in the Library tab. Lastly, if there is a flap or graft type which we do not have you need to let us know right away so I can add it in before the variable is hidden. No need to panic, we plan to give you roughly 6 months to make the change.
Cheek Interpolation Flap Text: A decision was made to reconstruct the defect utilizing an interpolation axial flap and a staged reconstruction.  A telfa template was made of the defect.  This telfa template was then used to outline the Cheek Interpolation flap.  The donor area for the pedicle flap was then injected with anesthesia.  The flap was excised through the skin and subcutaneous tissue down to the layer of the underlying musculature.  The interpolation flap was carefully excised within this deep plane to maintain its blood supply.  The edges of the donor site were undermined.   The donor site was closed in a primary fashion.  The pedicle was then rotated into position and sutured.  Once the tube was sutured into place, adequate blood supply was confirmed with blanching and refill.  The pedicle was then wrapped with xeroform gauze and dressed appropriately with a telfa and gauze bandage to ensure continued blood supply and protect the attached pedicle.
Bi-Rhombic Flap Text: The defect edges were debeveled with a #15 scalpel blade. Given the location of the defect and the proximity to free margins a bi-rhombic flap was deemed most appropriate. Using a sterile surgical marker, an appropriate rhombic flap was drawn incorporating the defect. The area thus outlined was incised deep to adipose tissue with a #15 scalpel blade. The skin margins were undermined to an appropriate distance in all directions utilizing iris scissors. Following this, the designed flap was carried over into the primary defect and sutured into place.
Cheiloplasty (Complex) Text: A decision was made to reconstruct the defect with a  cheiloplasty.  The defect was undermined extensively.  Additional orbicularis oris muscle was excised with a 15 blade scalpel.  The defect was converted into a full thickness wedge to facilite a better cosmetic result.  Small vessels were then tied off with 5-0 monocyrl. The orbicularis oris, superficial fascia, adipose and dermis were then reapproximated.  After the deeper layers were approximated the epidermis was reapproximated with particular care given to realign the vermilion border.
Which Eyelid Repair Cpt Are You Using?: 71187
Primary Defect Width (In Cm): 0
Burow's Advancement Flap Text: The defect edges were debeveled with a #15 scalpel blade. Given the location of the defect and the proximity to free margins a Burow's advancement flap was deemed most appropriate. Using a sterile surgical marker, the appropriate advancement flap was drawn incorporating the defect and placing the expected incisions within the relaxed skin tension lines where possible. The area thus outlined was incised deep to adipose tissue with a #15 scalpel blade. The skin margins were undermined to an appropriate distance in all directions utilizing iris scissors. Following this, the designed flap was advanced and carried over into the primary defect and sutured into place.
Ear Wedge Repair Text: A wedge excision was completed by carrying down an excision through the full thickness of the ear and cartilage with an inward facing Burow's triangle. The wound was then closed in a layered fashion.
Hemigard Retention Suture: 2-0 Nylon
Additional Anesthesia Volume In Cc: 3
Where Do You Want The Question To Include Opioid Counseling Located?: Case Summary Tab
Tarsorrhaphy Text: A tarsorrhaphy was performed using Frost sutures.
O-L Flap Text: The defect edges were debeveled with a #15 scalpel blade. Given the location of the defect, shape of the defect and the proximity to free margins an O-L flap was deemed most appropriate. Using a sterile surgical marker, an appropriate advancement flap was drawn incorporating the defect and placing the expected incisions within the relaxed skin tension lines where possible. The area thus outlined was incised deep to adipose tissue with a #15 scalpel blade. The skin margins were undermined to an appropriate distance in all directions utilizing iris scissors. Following this, the designed flap was advanced and carried over into the primary defect and sutured into place.
Helical Rim Advancement Flap Text: The defect edges were debeveled with a #15 blade scalpel.  Given the location of the defect and the proximity to free margins (helical rim) a double helical rim advancement flap was deemed most appropriate. Using a sterile surgical marker, the appropriate advancement flaps were drawn incorporating the defect and placing the expected incisions between the helical rim and antihelix where possible.  The area thus outlined was incised through and through with a #15 scalpel blade.  With a skin hook and iris scissors, the flaps were gently and sharply undermined and freed up. Folllowing this, the designed flaps were carried over into the primary defect and sutured into place.
Transposition Flap Text: The defect edges were debeveled with a #15 scalpel blade. Given the location of the defect and the proximity to free margins a transposition flap was deemed most appropriate. Using a sterile surgical marker, an appropriate transposition flap was drawn incorporating the defect. The area thus outlined was incised deep to adipose tissue with a #15 scalpel blade. The skin margins were undermined to an appropriate distance in all directions utilizing iris scissors. Following this, the designed flap was carried over into the primary defect and sutured into place.
Cheek-To-Nose Interpolation Flap Text: A decision was made to reconstruct the defect utilizing an interpolation axial flap and a staged reconstruction.  A telfa template was made of the defect.  This telfa template was then used to outline the Cheek-To-Nose Interpolation flap.  The donor area for the pedicle flap was then injected with anesthesia.  The flap was excised through the skin and subcutaneous tissue down to the layer of the underlying musculature.  The interpolation flap was carefully excised within this deep plane to maintain its blood supply.  The edges of the donor site were undermined.   The donor site was closed in a primary fashion.  The pedicle was then rotated into position and sutured.  Once the tube was sutured into place, adequate blood supply was confirmed with blanching and refill.  The pedicle was then wrapped with xeroform gauze and dressed appropriately with a telfa and gauze bandage to ensure continued blood supply and protect the attached pedicle.
Number Of Hemigard Strips Per Side: 1
Crescentic Intermediate Repair Preamble Text (Leave Blank If You Do Not Want): Undermining was performed with blunt dissection.
Skin Substitute Paste Text: The defect edges were debeveled with a #15 scalpel blade. Given the location of the defect, shape of the defect and the proximity to free margins a skin substitute micronized graft was deemed most appropriate.  The entire vial contents were admixed with 0.5ccs of sterile saline, formed into a paste and then evenly spread over the entire wound bed.
Referred To Oculoplastics For Closure Text (Leave Blank If You Do Not Want): After obtaining clear surgical margins the patient was sent to oculoplastics for surgical repair.  The patient understands they will receive post-surgical care and follow-up from the referring physician's office.
Nostril Rim Text: The closure involved the nostril rim.
Ftsg Text: The defect edges were debeveled with a #15 scalpel blade. Given the location of the defect, shape of the defect and the proximity to free margins a full thickness skin graft was deemed most appropriate. Using a sterile surgical marker, the primary defect shape was transferred to the donor site. The area thus outlined was incised deep to adipose tissue with a #15 scalpel blade.  The harvested graft was then trimmed of adipose tissue until only dermis and epidermis was left.  The skin graft was then placed in the primary defect and oriented appropriately.
Eyelid Partial Thickness Repair - 91714: The eyelid defect was partial thickness which required a wedge repair of the eyelid. Special care was taken to ensure that the eyelid margin was realligned when placing sutures.
Crescentic Advancement Flap Text: The defect edges were debeveled with a #15 scalpel blade. Given the location of the defect and the proximity to free margins a crescentic advancement flap was deemed most appropriate. Using a sterile surgical marker, the appropriate advancement flap was drawn incorporating the defect and placing the expected incisions within the relaxed skin tension lines where possible. The area thus outlined was incised deep to adipose tissue with a #15 scalpel blade. The skin margins were undermined to an appropriate distance in all directions utilizing iris scissors. Following this, the designed flap was advanced and carried over into the primary defect and sutured into place.
Closure 3 Information: This tab is for additional flaps and grafts above and beyond our usual structured repairs.  Please note if you enter information here it will not currently bill and you will need to add the billing information manually.
Information: Selecting Yes will display possible errors in your note based on the variables you have selected. This validation is only offered as a suggestion for you. PLEASE NOTE THAT THE VALIDATION TEXT WILL BE REMOVED WHEN YOU FINALIZE YOUR NOTE. IF YOU WANT TO FAX A PRELIMINARY NOTE YOU WILL NEED TO TOGGLE THIS TO 'NO' IF YOU DO NOT WANT IT IN YOUR FAXED NOTE.
Intermediate Repair And Graft Additional Text (Will Appearing After The Standard Complex Repair Text): The intermediate repair was not sufficient to completely close the primary defect. The remaining additional defect was repaired with the graft mentioned below.
Island Pedicle Flap Text: The defect edges were debeveled with a #15 scalpel blade. Given the location of the defect, shape of the defect and the proximity to free margins an island pedicle advancement flap was deemed most appropriate. Using a sterile surgical marker, an appropriate advancement flap was drawn incorporating the defect, outlining the appropriate donor tissue and placing the expected incisions within the relaxed skin tension lines where possible. The area thus outlined was incised deep to adipose tissue with a #15 scalpel blade. The skin margins were undermined to an appropriate distance in all directions around the primary defect and laterally outward around the island pedicle utilizing iris scissors.  There was minimal undermining beneath the pedicle flap. Following this, the flap was carried over into the primary defect and sutured into place.
O-Z Plasty Text: The defect edges were debeveled with a #15 scalpel blade. Given the location of the defect, shape of the defect and the proximity to free margins an O-Z plasty (double transposition flap) was deemed most appropriate. Using a sterile surgical marker, the appropriate transposition flaps were drawn incorporating the defect and placing the expected incisions within the relaxed skin tension lines where possible. The area thus outlined was incised deep to adipose tissue with a #15 scalpel blade. The skin margins were undermined to an appropriate distance in all directions utilizing iris scissors. Hemostasis was achieved with electrocautery. The flaps were then transposed and carried over into place, one clockwise and the other counterclockwise, and anchored with interrupted buried subcutaneous sutures.
Advancement Flap (Single) Text: The defect edges were debeveled with a #15 scalpel blade. Given the location of the defect and the proximity to free margins a single advancement flap was deemed most appropriate. Using a sterile surgical marker, an appropriate advancement flap was drawn incorporating the defect and placing the expected incisions within the relaxed skin tension lines where possible. The area thus outlined was incised deep to adipose tissue with a #15 scalpel blade. The skin margins were undermined to an appropriate distance in all directions utilizing iris scissors. Following this, the designed flap was advanced and carried over into the primary defect and sutured into place.
V-Y Flap Text: The defect edges were debeveled with a #15 scalpel blade. Given the location of the defect, shape of the defect and the proximity to free margins a V-Y flap was deemed most appropriate. Using a sterile surgical marker, an appropriate advancement flap was drawn incorporating the defect and placing the expected incisions within the relaxed skin tension lines where possible. The area thus outlined was incised deep to adipose tissue with a #15 scalpel blade. The skin margins were undermined to an appropriate distance in all directions utilizing iris scissors. Following this, the designed flap was advanced and carried over into the primary defect and sutured into place.
Estlander Flap (Upper To Lower Lip) Text: The defect of the lower lip was assessed and measured.  Given the location and size of the defect, an Estlander flap was deemed most appropriate. Using a sterile surgical marker, an appropriate Estlander flap was measured and drawn on the upper lip. Local anesthesia was then infiltrated. A scalpel was then used to incise the lateral aspect of the flap, through skin, muscle and mucosa, leaving the flap pedicled medially.  The flap was then rotated and positioned to fill the lower lip defect.  The flap was then sutured into place with a three layer technique, closing the orbicularis oris muscle layer with subcutaneous buried sutures, followed by a mucosal layer and an epidermal layer.
Unna Boot Text: An Unna boot was placed to help immobilize the limb and facilitate more rapid healing.
Referred To Plastics For Closure Text (Leave Blank If You Do Not Want): After obtaining clear surgical margins the patient was sent to plastics for surgical repair.  The patient understands they will receive post-surgical care and follow-up from the referring physician's office.
Simple / Intermediate / Complex Repair - Final Wound Length In Cm: 2.7
Tissue Cultured Epidermal Autograft Text: The defect edges were debeveled with a #15 scalpel blade. Given the location of the defect, shape of the defect and the proximity to free margins a tissue cultured epidermal autograft was deemed most appropriate.  The graft was then trimmed to fit the size of the defect.  The graft was then placed in the primary defect and oriented appropriately.
Epidermal Closure Graft Donor Site (Optional): simple interrupted
Melolabial Interpolation Flap Text: A decision was made to reconstruct the defect utilizing an interpolation axial flap and a staged reconstruction.  A telfa template was made of the defect.  This telfa template was then used to outline the melolabial interpolation flap.  The donor area for the pedicle flap was then injected with anesthesia.  The flap was excised through the skin and subcutaneous tissue down to the layer of the underlying musculature.  The pedicle flap was carefully excised within this deep plane to maintain its blood supply.  The edges of the donor site were undermined.   The donor site was closed in a primary fashion.  The pedicle was then rotated into position and sutured.  Once the tube was sutured into place, adequate blood supply was confirmed with blanching and refill.  The pedicle was then wrapped with xeroform gauze and dressed appropriately with a telfa and gauze bandage to ensure continued blood supply and protect the attached pedicle.
Double Island Pedicle Flap Text: The defect edges were debeveled with a #15 scalpel blade. Given the location of the defect, shape of the defect and the proximity to free margins a double island pedicle advancement flap was deemed most appropriate. Using a sterile surgical marker, an appropriate advancement flap was drawn incorporating the defect, outlining the appropriate donor tissue and placing the expected incisions within the relaxed skin tension lines where possible. The area thus outlined was incised deep to adipose tissue with a #15 scalpel blade. The skin margins were undermined to an appropriate distance in all directions around the primary defect and laterally outward around the island pedicle utilizing iris scissors.  There was minimal undermining beneath the pedicle flap. Following this, the flap was carried over into the primary defect and sutured into place.
Suturegard Body: The suture ends were repeatedly re-tightened and re-clamped to achieve the desired tissue expansion.
Pinch Graft Text: The defect edges were debeveled with a #15 scalpel blade. Given the location of the defect, shape of the defect and the proximity to free margins a pinch graft was deemed most appropriate. Using a sterile surgical marker, the primary defect shape was transferred to the donor site. The area thus outlined was incised deep to adipose tissue with a #15 scalpel blade.  The harvested graft was then trimmed of adipose tissue until only dermis and epidermis was left. The skin graft was then placed in the primary defect and oriented appropriately.
A-T Advancement Flap Text: The defect edges were debeveled with a #15 scalpel blade. Given the location of the defect, shape of the defect and the proximity to free margins an A-T advancement flap was deemed most appropriate. Using a sterile surgical marker, an appropriate advancement flap was drawn incorporating the defect and placing the expected incisions within the relaxed skin tension lines where possible. The area thus outlined was incised deep to adipose tissue with a #15 scalpel blade. The skin margins were undermined to an appropriate distance in all directions utilizing iris scissors. Following this, the designed flap was advanced and carried over into the primary defect and sutured into place.
Cheek Interpolation Flap Division And Inset Text: Division and inset of the cheek interpolation flap was performed to achieve optimal aesthetic result, restore normal anatomic appearance and avoid distortion of normal anatomy, expedite and facilitate wound healing, achieve optimal functional result and because linear closure either not possible or would produce suboptimal result. The patient was prepped and draped in the usual manner. The pedicle was infiltrated with local anesthesia. The pedicle was sectioned with a #15 blade. The pedicle was de-bulked and trimmed to match the shape of the defect. Hemostasis was achieved. The flap donor site and free margin of the flap were secured with deep buried sutures and the wound edges were re-approximated.
Nasal Turnover Hinge Flap Text: The defect edges were debeveled with a #15 scalpel blade.  Given the size, depth, location of the defect and the defect being full thickness a nasal turnover hinge flap was deemed most appropriate. Using a sterile surgical marker, an appropriate hinge flap was drawn incorporating the defect. The area thus outlined was incised with a #15 scalpel blade. The flap was designed to recreate the nasal mucosal lining and the alar rim. The skin margins were undermined to an appropriate distance in all directions utilizing iris scissors. Following this, the designed flap was carried over into the primary defect and sutured into place
Island Pedicle Flap-Requiring Vessel Identification Text: The defect edges were debeveled with a #15 scalpel blade. Given the location of the defect, shape of the defect and the proximity to free margins an island pedicle advancement flap was deemed most appropriate. Using a sterile surgical marker, an appropriate advancement flap was drawn, based on the axial vessel mentioned above, incorporating the defect, outlining the appropriate donor tissue and placing the expected incisions within the relaxed skin tension lines where possible. The area thus outlined was incised deep to adipose tissue with a #15 scalpel blade. The skin margins were undermined to an appropriate distance in all directions around the primary defect and laterally outward around the island pedicle utilizing iris scissors.  There was minimal undermining beneath the pedicle flap. Following this, the designed flap was carried over into the primary defect and sutured into place.
Graft Donor Site Bandage (Optional-Leave Blank If You Don't Want In Note): Telfa and a pressure bandage were applied to the donor site.
Complex Repair And Graft Additional Text (Will Appearing After The Standard Complex Repair Text): The complex repair was not sufficient to completely close the primary defect. The remaining additional defect was repaired with the graft mentioned below.
Rectangular Flap Text: The defect edges were debeveled with a #15 scalpel blade. Given the location of the defect and the proximity to free margins a rectangular flap was deemed most appropriate. Using a sterile surgical marker, an appropriate rectangular flap was drawn incorporating the defect. The area thus outlined was incised deep to adipose tissue with a #15 scalpel blade. The skin margins were undermined to an appropriate distance in all directions utilizing iris scissors. Following this, the designed flap was carried over into the primary defect and sutured into place.
Advancement-Rotation Flap Text: The defect edges were debeveled with a #15 scalpel blade. Given the location of the defect, shape of the defect and the proximity to free margins an advancement-rotation flap was deemed most appropriate. Using a sterile surgical marker, an appropriate flap was drawn incorporating the defect and placing the expected incisions within the relaxed skin tension lines where possible. The area thus outlined was incised deep to adipose tissue with a #15 scalpel blade. The skin margins were undermined to an appropriate distance in all directions utilizing iris scissors. Following this, the designed flap was carried over into the primary defect and sutured into place.
W Plasty Text: The lesion was extirpated to the level of the fat with a #15 scalpel blade. Given the location of the defect, shape of the defect and the proximity to free margins a W-plasty was deemed most appropriate for repair. Using a sterile surgical marker, the appropriate transposition arms of the W-plasty were drawn incorporating the defect and placing the expected incisions within the relaxed skin tension lines where possible. The area thus outlined was incised deep to adipose tissue with a #15 scalpel blade. The skin margins were undermined to an appropriate distance in all directions utilizing iris scissors. The opposing transposition arms were then transposed and carried over into place in opposite direction and anchored with interrupted buried subcutaneous sutures.
Consent: The rationale for the repair was explained to the patient and consent was obtained. The risks, benefits and alternatives to therapy were discussed in detail. Specifically, the risks of infection, scarring, bleeding, prolonged wound healing, incomplete removal, allergy to anesthesia, nerve injury and recurrence were addressed. Prior to the procedure, the treatment site was clearly identified and confirmed by the patient. All components of Universal Protocol/PAUSE Rule completed.
Keystone Flap Text: The defect edges were debeveled with a #15 scalpel blade. Given the location of the defect, shape of the defect a keystone flap was deemed most appropriate. Using a sterile surgical marker, an appropriate keystone flap was drawn incorporating the defect, outlining the appropriate donor tissue and placing the expected incisions within the relaxed skin tension lines where possible. The area thus outlined was incised deep to adipose tissue with a #15 scalpel blade. The skin margins were undermined to an appropriate distance in all directions around the primary defect and laterally outward around the flap utilizing iris scissors. Following this, the designed flap was carried into the primary defect and sutured into place.
Rhombic Flap Text: The defect edges were debeveled with a #15 scalpel blade. Given the location of the defect and the proximity to free margins a rhombic flap was deemed most appropriate. Using a sterile surgical marker, an appropriate rhombic flap was drawn incorporating the defect. The area thus outlined was incised deep to adipose tissue with a #15 scalpel blade. The skin margins were undermined to an appropriate distance in all directions utilizing iris scissors. Following this, the designed flap was carried over into the primary defect and sutured into place.
Anesthesia Volume In Cc: 6
Z Plasty Text: The lesion was extirpated to the level of the fat with a #15 scalpel blade. Given the location of the defect, shape of the defect and the proximity to free margins a Z-plasty was deemed most appropriate for repair. Using a sterile surgical marker, the appropriate transposition arms of the Z-plasty were drawn incorporating the defect and placing the expected incisions within the relaxed skin tension lines where possible. The area thus outlined was incised deep to adipose tissue with a #15 scalpel blade. The skin margins were undermined to an appropriate distance in all directions utilizing iris scissors. The opposing transposition arms were then transposed and carried over into place in opposite direction and anchored with interrupted buried subcutaneous sutures.
Hemigard Intro: Due to skin fragility and wound tension, it was decided to use HEMIGARD adhesive retention suture devices to permit a linear closure. The skin was cleaned and dried for a 6cm distance away from the wound. Excessive hair, if present, was removed to allow for adhesion.
Mastoid Interpolation Flap Text: A decision was made to reconstruct the defect utilizing an interpolation axial flap and a staged reconstruction.  A telfa template was made of the defect.  This telfa template was then used to outline the mastoid interpolation flap.  The donor area for the pedicle flap was then injected with anesthesia.  The flap was excised through the skin and subcutaneous tissue down to the layer of the underlying musculature.  The pedicle flap was carefully excised within this deep plane to maintain its blood supply.  The edges of the donor site were undermined.   The donor site was closed in a primary fashion.  The pedicle was then rotated into position and sutured.  Once the tube was sutured into place, adequate blood supply was confirmed with blanching and refill.  The pedicle was then wrapped with xeroform gauze and dressed appropriately with a telfa and gauze bandage to ensure continued blood supply and protect the attached pedicle.
Double O-Z Flap Text: The defect edges were debeveled with a #15 scalpel blade. Given the location of the defect, shape of the defect and the proximity to free margins a Double O-Z flap was deemed most appropriate. Using a sterile surgical marker, an appropriate transposition flap was drawn incorporating the defect and placing the expected incisions within the relaxed skin tension lines where possible. The area thus outlined was incised deep to adipose tissue with a #15 scalpel blade. The skin margins were undermined to an appropriate distance in all directions utilizing iris scissors. Following this, the designed flap was carried over into the primary defect and sutured into place.
Burow's Graft Text: The defect edges were debeveled with a #15 scalpel blade. Given the location of the defect, shape of the defect, the proximity to free margins and the presence of a standing cone deformity a Burow's skin graft was deemed most appropriate. The standing cone was removed and this tissue was then trimmed to the shape of the primary defect. The adipose tissue was also removed until only dermis and epidermis were left.  The skin graft was then placed in the primary defect and oriented appropriately.
Repair Performed By Another Provider Text (Leave Blank If You Do Not Want): After obtaining clear surgical margins the defect was repaired by another provider.
Purse String (Intermediate) Text: Given the location of the defect and the characteristics of the surrounding skin a purse string intermediate closure was deemed most appropriate.  Undermining was performed circumferentially around the surgical defect.  A purse string suture was then placed and tightened.
Graft Cartilage Fenestration Text: The cartilage was fenestrated with a 2mm punch biopsy to help facilitate graft survival and healing.
Alar Island Pedicle Flap Text: The defect edges were debeveled with a #15 scalpel blade. Given the location of the defect, shape of the defect and the proximity to the alar rim an island pedicle advancement flap was deemed most appropriate. Using a sterile surgical marker, an appropriate advancement flap was drawn incorporating the defect, outlining the appropriate donor tissue and placing the expected incisions within the nasal ala running parallel to the alar rim. The area thus outlined was incised with a #15 scalpel blade. The skin margins were undermined minimally to an appropriate distance in all directions around the primary defect and laterally outward around the island pedicle utilizing iris scissors.  There was minimal undermining beneath the pedicle flap. Following this, the designed flap was carried over into the primary defect and sutured into place.
Retention Suture Bite Size: 3 mm
Nasalis-Muscle-Based Myocutaneous Island Pedicle Flap Text: Using a #15 blade, an incision was made around the donor flap to the level of the nasalis muscle. Wide lateral undermining was then performed in both the subcutaneous plane above the nasalis muscle, and in a submuscular plane just above periosteum. This allowed the formation of a free nasalis muscle axial pedicle (based on the angular artery) which was still attached to the actual cutaneous flap, increasing its mobility and vascular viability. Hemostasis was obtained with pinpoint electrocoagulation. The flap was mobilized into position and the pivotal anchor points positioned and stabilized with buried interrupted sutures. Subcutaneous and dermal tissues were closed in a multilayered fashion with sutures. Tissue redundancies were excised, and the epidermal edges were apposed without significant tension and sutured with sutures.
Mastoid Interpolation Flap Division And Inset Text: Division and inset of the mastoid interpolation flap was performed to achieve optimal aesthetic result, restore normal anatomic appearance and avoid distortion of normal anatomy, expedite and facilitate wound healing, achieve optimal functional result and because linear closure either not possible or would produce suboptimal result. The patient was prepped and draped in the usual manner. The pedicle was infiltrated with local anesthesia. The pedicle was sectioned with a #15 blade. The pedicle was de-bulked and trimmed to match the shape of the defect. Hemostasis was achieved. The flap donor site and free margin of the flap were secured with deep buried sutures and the wound edges were re-approximated.
Anesthesia Type: 1% lidocaine with epinephrine
Bilateral Rotation Flap Text: The defect edges were debeveled with a #15 scalpel blade. Given the location of the defect, shape of the defect and the proximity to free margins a bilateral rotation flap was deemed most appropriate. Using a sterile surgical marker, an appropriate rotation flap was drawn incorporating the defect and placing the expected incisions within the relaxed skin tension lines where possible. The area thus outlined was incised deep to adipose tissue with a #15 scalpel blade. The skin margins were undermined to an appropriate distance in all directions utilizing iris scissors. Following this, the designed flap was carried over into the primary defect and sutured into place.
Rotation Flap Text: The defect edges were debeveled with a #15 scalpel blade. Given the location of the defect, shape of the defect and the proximity to free margins a rotation flap was deemed most appropriate. Using a sterile surgical marker, an appropriate rotation flap was drawn incorporating the defect and placing the expected incisions within the relaxed skin tension lines where possible. The area thus outlined was incised deep to adipose tissue with a #15 scalpel blade. The skin margins were undermined to an appropriate distance in all directions utilizing iris scissors. Following this, the designed flap was carried over into the primary defect and sutured into place.
Length To Time In Minutes Device Was In Place: 10
Double Z Plasty Text: The lesion was extirpated to the level of the fat with a #15 scalpel blade. Given the location of the defect, shape of the defect and the proximity to free margins a double Z-plasty was deemed most appropriate for repair. Using a sterile surgical marker, the appropriate transposition arms of the double Z-plasty were drawn incorporating the defect and placing the expected incisions within the relaxed skin tension lines where possible. The area thus outlined was incised deep to adipose tissue with a #15 scalpel blade. The skin margins were undermined to an appropriate distance in all directions utilizing iris scissors. The opposing transposition arms were then transposed and carried over into place in opposite direction and anchored with interrupted buried subcutaneous sutures.
Posterior Auricular Interpolation Flap Text: A decision was made to reconstruct the defect utilizing an interpolation axial flap and a staged reconstruction.  A telfa template was made of the defect.  This telfa template was then used to outline the posterior auricular interpolation flap.  The donor area for the pedicle flap was then injected with anesthesia.  The flap was excised through the skin and subcutaneous tissue down to the layer of the underlying musculature.  The pedicle flap was carefully excised within this deep plane to maintain its blood supply.  The edges of the donor site were undermined.   The donor site was closed in a primary fashion.  The pedicle was then rotated into position and sutured.  Once the tube was sutured into place, adequate blood supply was confirmed with blanching and refill.  The pedicle was then wrapped with xeroform gauze and dressed appropriately with a telfa and gauze bandage to ensure continued blood supply and protect the attached pedicle.
Width Of Defect Perpendicular To Closure In Cm (Required): 0.9
Composite Graft Text: The defect edges were debeveled with a #15 scalpel blade. Given the location of the defect, shape of the defect, the proximity to free margins and the fact the defect was full thickness a composite graft was deemed most appropriate.  The defect was outline and then transferred to the donor site.  A full thickness graft was then excised from the donor site. The graft was then placed in the primary defect, oriented appropriately and then sutured into place.  The secondary defect was then repaired using a primary closure.
Wound Care: Petrolatum
Flap Thinning Complex Repair Preamble Text (Leave Blank If You Do Not Want): An incision was made along the previous flap suture line. Undermining was performed beneath the flap and redundant tissue was removed to restore the normal contour of the skin.
Preparation Of Recipient Site - Graft: The eschar was removed surgically with sharp dissection to facilitate appropriate survival of the following graft.
Partial Purse String (Intermediate) Text: Given the location of the defect and the characteristics of the surrounding skin an intermediate purse string closure was deemed most appropriate.  Undermining was performed circumferentially around the surgical defect.  A purse string suture was then placed and tightened. Wound tension only allowed a partial closure of the circular defect.
Nasolabial Transposition Flap Text: The defect edges were debeveled with a #15 scalpel blade.  Given the size, depth and location of the defect and the proximity to free margins a nasolabial transposition flap was deemed most appropriate. Using a sterile surgical marker, an appropriate flap was drawn incorporating the defect. The area thus outlined was incised with a #15 scalpel blade. The skin margins were undermined to an appropriate distance in all directions utilizing iris scissors. Following this, the designed flap was carried into the primary defect and sutured into place.
Mercedes Flap Text: The defect edges were debeveled with a #15 scalpel blade. Given the location of the defect, shape of the defect and the proximity to free margins a Mercedes flap was deemed most appropriate. Using a sterile surgical marker, an appropriate advancement flap was drawn incorporating the defect and placing the expected incisions within the relaxed skin tension lines where possible. The area thus outlined was incised deep to adipose tissue with a #15 scalpel blade. The skin margins were undermined to an appropriate distance in all directions utilizing iris scissors. Following this, the designed flap was advanced and carried over into the primary defect and sutured into place.
Suture Removal: 7 days
Dermal Autograft Text: The defect edges were debeveled with a #15 scalpel blade. Given the location of the defect, shape of the defect and the proximity to free margins a dermal autograft was deemed most appropriate. Using a sterile surgical marker, the primary defect shape was transferred to the donor site. The area thus outlined was incised deep to adipose tissue with a #15 scalpel blade.  The harvested graft was then trimmed of adipose and epidermal tissue until only dermis was left.  The skin graft was then placed in the primary defect and oriented appropriately.
Bilobed Transposition Flap Text: The defect edges were debeveled with a #15 scalpel blade. Given the location of the defect and the proximity to free margins a bilobed transposition flap was deemed most appropriate. Using a sterile surgical marker, an appropriate bilobe flap drawn around the defect. The area thus outlined was incised deep to adipose tissue with a #15 scalpel blade. The skin margins were undermined to an appropriate distance in all directions utilizing iris scissors. Following this, the designed flap was carried over into the primary defect and sutured into place.
Posterior Auricular Interpolation Flap Division And Inset Text: Division and inset of the posterior auricular interpolation flap was performed to achieve optimal aesthetic result, restore normal anatomic appearance and avoid distortion of normal anatomy, expedite and facilitate wound healing, achieve optimal functional result and because linear closure either not possible or would produce suboptimal result. The patient was prepped and draped in the usual manner. The pedicle was infiltrated with local anesthesia. The pedicle was sectioned with a #15 blade. The pedicle was de-bulked and trimmed to match the shape of the defect. Hemostasis was achieved. The flap donor site and free margin of the flap were secured with deep buried sutures and the wound edges were re-approximated.
O-T Advancement Flap Text: The defect edges were debeveled with a #15 scalpel blade. Given the location of the defect, shape of the defect and the proximity to free margins an O-T advancement flap was deemed most appropriate. Using a sterile surgical marker, an appropriate advancement flap was drawn incorporating the defect and placing the expected incisions within the relaxed skin tension lines where possible. The area thus outlined was incised deep to adipose tissue with a #15 scalpel blade. The skin margins were undermined to an appropriate distance in all directions utilizing iris scissors. Following this, the designed flap was advanced and carried over into the primary defect and sutured into place.
Staged Advancement Flap Text: The defect edges were debeveled with a #15 scalpel blade. Given the location of the defect, shape of the defect and the proximity to free margins a staged advancement flap was deemed most appropriate. Using a sterile surgical marker, an appropriate advancement flap was drawn incorporating the defect and placing the expected incisions within the relaxed skin tension lines where possible. The area thus outlined was incised deep to adipose tissue with a #15 scalpel blade. The skin margins were undermined to an appropriate distance in all directions utilizing iris scissors. Following this, the designed flap was carried over into the primary defect and sutured into place.
Undermining Type: Entire Wound
Abbe Flap (Upper To Lower Lip) Text: The defect of the lower lip was assessed and measured.  Given the location and size of the defect, an Abbe flap was deemed most appropriate. Using a sterile surgical marker, an appropriate Abbe flap was measured and drawn on the upper lip. Local anesthesia was then infiltrated.  A scalpel was then used to incise the upper lip through and through the skin, vermilion, muscle and mucosa, leaving the flap pedicled on the opposite side.  The flap was then rotated and transferred to the lower lip defect.  The flap was then sutured into place with a three layer technique, closing the orbicularis oris muscle layer with subcutaneous buried sutures, followed by a mucosal layer and an epidermal layer.
Flip-Flop Flap Text: The defect edges were debeveled with a #15 blade scalpel.  Given the location of the defect and the proximity to free margins a flip-flop flap was deemed most appropriate. Using a sterile surgical marker, the appropriate flap was drawn incorporating the defect and placing the expected incisions between the helical rim and antihelix where possible.  The area thus outlined was incised through and through with a #15 scalpel blade. Following this, the designed flap was carried over into the primary defect and sutured into place.
Cheiloplasty (Less Than 50%) Text: A decision was made to reconstruct the defect with a  cheiloplasty.  The defect was undermined extensively.  Additional orbicularis oris muscle was excised with a 15 blade scalpel.  The defect was converted into a full thickness wedge, of less than 50% of the vertical height of the lip, to facilite a better cosmetic result.  Small vessels were then tied off with 5-0 monocyrl. The orbicularis oris, superficial fascia, adipose and dermis were then reapproximated.  After the deeper layers were approximated the epidermis was reapproximated with particular care given to realign the vermilion border.
Helical Rim Text: The closure involved the helical rim.
Dressing: pressure dressing with telfa
Secondary Intention Text (Leave Blank If You Do Not Want): The defect will heal with secondary intention.
Mucosal Advancement Flap Text: Given the location of the defect, shape of the defect and the proximity to free margins a mucosal advancement flap was deemed most appropriate. Incisions were made with a 15 blade scalpel in the appropriate fashion along the cutaneous vermilion border and the mucosal lip. The remaining actinically damaged mucosal tissue was excised.  The mucosal advancement flap was then elevated to the gingival sulcus with care taken to preserve the neurovascular structures and advanced into the primary defect. Care was taken to ensure that precise realignment of the vermilion border was achieved.
Localized Dermabrasion With Sand Papertext: The patient was draped in routine manner.  Localized dermabrasion using sterile sand paper was performed in routine manner to papillary dermis. This spot dermabrasion is being performed to complete skin cancer reconstruction. It also will eliminate the other sun damaged precancerous cells that are known to be part of the regional effect of a lifetime's worth of sun exposure. This localized dermabrasion is therapeutic and should not be considered cosmetic in any regard.
Localized Dermabrasion With 15 Blade Text: The patient was draped in routine manner.  Localized dermabrasion using a 15 blade was performed in routine manner to papillary dermis. This spot dermabrasion is being performed to complete skin cancer reconstruction. It also will eliminate the other sun damaged precancerous cells that are known to be part of the regional effect of a lifetime's worth of sun exposure. This localized dermabrasion is therapeutic and should not be considered cosmetic in any regard.
Vermilion Border Text: The closure involved the vermilion border.
Muscle Hinge Flap Text: The defect edges were debeveled with a #15 scalpel blade.  Given the size, depth and location of the defect and the proximity to free margins a muscle hinge flap was deemed most appropriate. Using a sterile surgical marker, an appropriate hinge flap was drawn incorporating the defect. The area thus outlined was incised with a #15 scalpel blade. The skin margins were undermined to an appropriate distance in all directions utilizing iris scissors. Following this, the designed flap was carried into the primary defect and sutured into place.
Epidermal Sutures: 5-0 Monosof
Hatchet Flap Text: The defect edges were debeveled with a #15 scalpel blade. Given the location of the defect, shape of the defect and the proximity to free margins a hatchet flap was deemed most appropriate. Using a sterile surgical marker, an appropriate hatchet flap was drawn incorporating the defect and placing the expected incisions within the relaxed skin tension lines where possible. The area thus outlined was incised deep to adipose tissue with a #15 scalpel blade. The skin margins were undermined to an appropriate distance in all directions utilizing iris scissors. Following this, the designed flap was carried over into the primary defect and sutured into place.
O-T Plasty Text: The defect edges were debeveled with a #15 scalpel blade. Given the location of the defect, shape of the defect and the proximity to free margins an O-T plasty was deemed most appropriate. Using a sterile surgical marker, an appropriate O-T plasty was drawn incorporating the defect and placing the expected incisions within the relaxed skin tension lines where possible. The area thus outlined was incised deep to adipose tissue with a #15 scalpel blade. The skin margins were undermined to an appropriate distance in all directions utilizing iris scissors. Following this, the designed flap was carried over into the primary defect and sutured into place.
Star Wedge Flap Text: The defect edges were debeveled with a #15 scalpel blade. Given the location of the defect, shape of the defect and the proximity to free margins a star wedge flap was deemed most appropriate. Using a sterile surgical marker, an appropriate rotation flap was drawn incorporating the defect and placing the expected incisions within the relaxed skin tension lines where possible. The area thus outlined was incised deep to adipose tissue with a #15 scalpel blade. The skin margins were undermined to an appropriate distance in all directions utilizing iris scissors. Following this, the designed flap was carried over into the primary defect and sutured into place.
Hemostasis: Electrocautery
Skin Substitute Text: The defect edges were debeveled with a #15 scalpel blade. Given the location of the defect, shape of the defect and the proximity to free margins a skin substitute graft was deemed most appropriate.  The graft material was trimmed to fit the size of the defect. The graft was then placed in the primary defect and oriented appropriately.
No Repair - Repaired With Adjacent Surgical Defect Text (Leave Blank If You Do Not Want): After obtaining clear surgical margins the defect was repaired concurrently with another surgical defect which was in close approximation.
Detail Level: Detailed
H Plasty Text: Given the location of the defect, shape of the defect and the proximity to free margins a H-plasty was deemed most appropriate for repair. Using a sterile surgical marker, the appropriate advancement arms of the H-plasty were drawn incorporating the defect and placing the expected incisions within the relaxed skin tension lines where possible. The area thus outlined was incised deep to adipose tissue with a #15 scalpel blade. The skin margins were undermined to an appropriate distance in all directions utilizing iris scissors.  The opposing advancement arms were then advanced and carried over into place in opposite direction and anchored with interrupted buried subcutaneous sutures.
Chonodrocutaneous Helical Advancement Flap Text: The defect edges were debeveled with a #15 scalpel blade. Given the location of the defect and the proximity to free margins a chondrocutaneous helical advancement flap was deemed most appropriate. Using a sterile surgical marker, the appropriate advancement flap was drawn incorporating the defect and placing the expected incisions within the relaxed skin tension lines where possible. The area thus outlined was incised deep to adipose tissue with a #15 scalpel blade. The skin margins were undermined to an appropriate distance in all directions utilizing iris scissors. Following this, the designed flap was advanced and carried over into the primary defect and sutured into place.
Full Thickness Lip Wedge Repair (Flap) Text: Given the location of the defect and the proximity to free margins a full thickness wedge repair was deemed most appropriate. Using a sterile surgical marker, the appropriate repair was drawn incorporating the defect and placing the expected incisions perpendicular to the vermilion border.  The vermilion border was also meticulously outlined to ensure appropriate reapproximation during the repair.  The area thus outlined was incised through and through with a #15 scalpel blade.  The muscularis and dermis were reaproximated with deep sutures following hemostasis. Care was taken to realign the vermilion border before proceeding with the superficial closure.  Once the vermilion was realigned the superfical and mucosal closure was finished.
Skin Substitute Injection Text: The defect edges were debeveled with a #15 scalpel blade. Given the location of the defect, shape of the defect and the proximity to free margins a skin substitute micronized graft was deemed most appropriate.  The entire vial contents were admixed with 3.0ccs of sterile saline and then injected subcutaneously throughout the entire wound bed.
Referred To Otolaryngology For Closure Text (Leave Blank If You Do Not Want): After obtaining clear surgical margins the patient was sent to otolaryngology for surgical repair.  The patient understands they will receive post-surgical care and follow-up from the referring physician's office.
Estimated Blood Loss (Cc): minimal
Intermediate Repair And Flap Additional Text (Will Appearing After The Standard Complex Repair Text): The intermediate repair was not sufficient to completely close the primary defect. The remaining additional defect was repaired with the flap mentioned below.
O-Z Flap Text: The defect edges were debeveled with a #15 scalpel blade. Given the location of the defect, shape of the defect and the proximity to free margins an O-Z flap was deemed most appropriate. Using a sterile surgical marker, an appropriate transposition flap was drawn incorporating the defect and placing the expected incisions within the relaxed skin tension lines where possible. The area thus outlined was incised deep to adipose tissue with a #15 scalpel blade. The skin margins were undermined to an appropriate distance in all directions utilizing iris scissors. Following this, the designed flap was carried over into the primary defect and sutured into place.
Adjacent Tissue Transfer Text: The defect edges were debeveled with a #15 scalpel blade. Given the location of the defect and the proximity to free margins an adjacent tissue transfer was deemed most appropriate. Using a sterile surgical marker, an appropriate flap was drawn incorporating the defect and placing the expected incisions within the relaxed skin tension lines where possible. The area thus outlined was incised deep to adipose tissue with a #15 scalpel blade. The skin margins were undermined to an appropriate distance in all directions utilizing iris scissors and carried over to close the primary defect.
Trilobed Flap Text: The defect edges were debeveled with a #15 scalpel blade. Given the location of the defect and the proximity to free margins a trilobed flap was deemed most appropriate. Using a sterile surgical marker, an appropriate trilobed flap was drawn around the defect. The area thus outlined was incised deep to adipose tissue with a #15 scalpel blade. The skin margins were undermined to an appropriate distance in all directions utilizing iris scissors. Following this, the designed flap was carried into the primary defect and sutured into place.
Pain Refusal Text: I offered to prescribe pain medication but the patient refused to take this medication.
Split-Thickness Skin Graft Text: The defect edges were debeveled with a #15 scalpel blade. Given the location of the defect, shape of the defect and the proximity to free margins a split thickness skin graft was deemed most appropriate. Using a sterile surgical marker, the primary defect shape was transferred to the donor site. The split thickness graft was then harvested.  The skin graft was then placed in the primary defect and oriented appropriately.
Brow Lift Text: A midfrontal incision was made medially to the defect to allow access to the tissues just superior to the left eyebrow. Following careful dissection inferiorly in a supraperiosteal plane to the level of the left eyebrow, several 3-0 monocryl sutures were used to resuspend the eyebrow orbicularis oculi muscular unit to the superior frontal bone periosteum. This resulted in an appropriate reapproximation of static eyebrow symmetry and correction of the left brow ptosis.
Body Location Override (Optional - Billing Will Still Be Based On Selected Body Map Location If Applicable): nasal dorsum
V-Y Plasty Text: The defect edges were debeveled with a #15 scalpel blade. Given the location of the defect, shape of the defect and the proximity to free margins an V-Y advancement flap was deemed most appropriate. Using a sterile surgical marker, an appropriate advancement flap was drawn incorporating the defect and placing the expected incisions within the relaxed skin tension lines where possible. The area thus outlined was incised deep to adipose tissue with a #15 scalpel blade. The skin margins were undermined to an appropriate distance in all directions utilizing iris scissors. Following this, the designed flap was advanced and carried over into the primary defect and sutured into place.
Suturegard Intro: Intraoperative tissue expansion was performed, utilizing the SUTUREGARD device, in order to reduce wound tension.
Mustarde Flap Text: The defect edges were debeveled with a #15 scalpel blade.  Given the size, depth and location of the defect and the proximity to free margins a Mustarde flap was deemed most appropriate. Using a sterile surgical marker, an appropriate flap was drawn incorporating the defect. The area thus outlined was incised with a #15 scalpel blade. The skin margins were undermined to an appropriate distance in all directions utilizing iris scissors. Following this, the designed flap was carried into the primary defect and sutured into place.
Interpolation Flap Text: A decision was made to reconstruct the defect utilizing an interpolation axial flap and a staged reconstruction.  A telfa template was made of the defect.  This telfa template was then used to outline the interpolation flap.  The donor area for the pedicle flap was then injected with anesthesia.  The flap was excised through the skin and subcutaneous tissue down to the layer of the underlying musculature.  The interpolation flap was carefully excised within this deep plane to maintain its blood supply.  The edges of the donor site were undermined.   The donor site was closed in a primary fashion.  The pedicle was then rotated into position and sutured.  Once the tube was sutured into place, adequate blood supply was confirmed with blanching and refill.  The pedicle was then wrapped with xeroform gauze and dressed appropriately with a telfa and gauze bandage to ensure continued blood supply and protect the attached pedicle.
Dorsal Nasal Flap Text: The defect edges were debeveled with a #15 scalpel blade. Given the location of the defect and the proximity to free margins a dorsal nasal flap was deemed most appropriate. Using a sterile surgical marker, an appropriate dorsal nasal flap was drawn around the defect. The area thus outlined was incised deep to adipose tissue with a #15 scalpel blade. The skin margins were undermined to an appropriate distance in all directions utilizing iris scissors. Following this, the designed flap was carried into the primary defect and sutured into place.
Xenograft Text: The defect edges were debeveled with a #15 scalpel blade. Given the location of the defect, shape of the defect and the proximity to free margins a xenograft was deemed most appropriate.  The graft was then trimmed to fit the size of the defect.  The graft was then placed in the primary defect and oriented appropriately.
Referred To Asc For Closure Text (Leave Blank If You Do Not Want): After obtaining clear surgical margins the patient was sent to an ASC for surgical repair.  The patient understands they will receive post-surgical care and follow-up from the ASC physician.
Complex Repair And Flap Additional Text (Will Appearing After The Standard Complex Repair Text): The complex repair was not sufficient to completely close the primary defect. The remaining additional defect was repaired with the flap mentioned below.
Peng Advancement Flap Text: The defect edges were debeveled with a #15 scalpel blade. Given the location of the defect, shape of the defect and the proximity to free margins a Peng advancement flap was deemed most appropriate. Using a sterile surgical marker, an appropriate advancement flap was drawn incorporating the defect and placing the expected incisions within the relaxed skin tension lines where possible. The area thus outlined was incised deep to adipose tissue with a #15 scalpel blade. The skin margins were undermined to an appropriate distance in all directions utilizing iris scissors. Following this, the designed flap was advanced and carried over into the primary defect and sutured into place.
Island Pedicle Flap With Canthal Suspension Text: The defect edges were debeveled with a #15 scalpel blade. Given the location of the defect, shape of the defect and the proximity to free margins an island pedicle advancement flap was deemed most appropriate. Using a sterile surgical marker, an appropriate advancement flap was drawn incorporating the defect, outlining the appropriate donor tissue and placing the expected incisions within the relaxed skin tension lines where possible. The area thus outlined was incised deep to adipose tissue with a #15 scalpel blade. The skin margins were undermined to an appropriate distance in all directions around the primary defect and laterally outward around the island pedicle utilizing iris scissors.  There was minimal undermining beneath the pedicle flap. A suspension suture was placed in the canthal tendon to prevent tension and prevent ectropion. Following this, the designed flap was placed into the primary defect and sutured into place.
Retention Suture Text: Retention sutures were placed to support the closure and prevent dehiscence.
Advancement Flap (Double) Text: The defect edges were debeveled with a #15 scalpel blade. Given the location of the defect and the proximity to free margins a double advancement flap was deemed most appropriate. Using a sterile surgical marker, the appropriate advancement flaps were drawn incorporating the defect and placing the expected incisions within the relaxed skin tension lines where possible. The area thus outlined was incised deep to adipose tissue with a #15 scalpel blade. The skin margins were undermined to an appropriate distance in all directions utilizing iris scissors. Following this, the designed flaps were advanced and carried over into the primary defect and sutured into place.
Skin Substitute: EpiFix Micronized
Banner Transposition Flap Text: The defect edges were debeveled with a #15 scalpel blade. Given the location of the defect and the proximity to free margins a Banner transposition flap was deemed most appropriate. Using a sterile surgical marker, an appropriate flap was drawn around the defect. The area thus outlined was incised deep to adipose tissue with a #15 scalpel blade. The skin margins were undermined to an appropriate distance in all directions utilizing iris scissors. Following this, the designed flap was carried into the primary defect and sutured into place.
Epidermal Closure: running
Cheek To Nose Interpolation Flap Division And Inset Text: Division and inset of the cheek to nose interpolation flap was performed to achieve optimal aesthetic result, restore normal anatomic appearance and avoid distortion of normal anatomy, expedite and facilitate wound healing, achieve optimal functional result and because linear closure either not possible or would produce suboptimal result. The patient was prepped and draped in the usual manner. The pedicle was infiltrated with local anesthesia. The pedicle was sectioned with a #15 blade. The pedicle was de-bulked and trimmed to match the shape of the defect. Hemostasis was achieved. The flap donor site and free margin of the flap were secured with deep buried sutures and the wound edges were re-approximated.

## 2025-01-27 ENCOUNTER — HOSPITAL ENCOUNTER (OUTPATIENT)
Dept: MAMMOGRAPHY | Facility: CLINIC | Age: 65
Discharge: HOME | End: 2025-01-27
Payer: COMMERCIAL

## 2025-01-27 DIAGNOSIS — Z12.31 VISIT FOR SCREENING MAMMOGRAM: ICD-10-CM

## 2025-01-27 PROCEDURE — 77063 BREAST TOMOSYNTHESIS BI: CPT

## 2025-01-30 ENCOUNTER — APPOINTMENT (OUTPATIENT)
Age: 65
Setting detail: DERMATOLOGY
End: 2025-01-30

## 2025-01-30 DIAGNOSIS — Z48.02 ENCOUNTER FOR REMOVAL OF SUTURES: ICD-10-CM

## 2025-01-30 PROCEDURE — ? SUTURE REMOVAL (GLOBAL PERIOD)

## 2025-01-30 PROCEDURE — 99024 POSTOP FOLLOW-UP VISIT: CPT

## 2025-01-30 ASSESSMENT — LOCATION ZONE DERM: LOCATION ZONE: NOSE

## 2025-01-30 ASSESSMENT — LOCATION SIMPLE DESCRIPTION DERM: LOCATION SIMPLE: NOSE

## 2025-01-30 ASSESSMENT — LOCATION DETAILED DESCRIPTION DERM: LOCATION DETAILED: NASAL DORSUM

## 2025-01-30 NOTE — PROCEDURE: SUTURE REMOVAL (GLOBAL PERIOD)
Body Location Override (Optional - Billing Will Still Be Based On Selected Body Map Location If Applicable): nasal dorsum
Detail Level: Detailed
Add 18815 Cpt? (Important Note: In 2017 The Use Of 60062 Is Being Tracked By Cms To Determine Future Global Period Reimbursement For Global Periods): no

## 2025-02-20 ENCOUNTER — APPOINTMENT (OUTPATIENT)
Age: 65
Setting detail: DERMATOLOGY
End: 2025-02-20

## 2025-02-20 DIAGNOSIS — Z85.828 PERSONAL HISTORY OF OTHER MALIGNANT NEOPLASM OF SKIN: ICD-10-CM

## 2025-02-20 PROCEDURE — 99024 POSTOP FOLLOW-UP VISIT: CPT

## 2025-02-20 PROCEDURE — ? POST-OP WOUND CHECK

## 2025-02-20 ASSESSMENT — LOCATION DETAILED DESCRIPTION DERM: LOCATION DETAILED: NASAL DORSUM

## 2025-02-20 ASSESSMENT — LOCATION SIMPLE DESCRIPTION DERM: LOCATION SIMPLE: NOSE

## 2025-02-20 ASSESSMENT — LOCATION ZONE DERM: LOCATION ZONE: NOSE

## 2025-02-20 NOTE — PROCEDURE: POST-OP WOUND CHECK
Detail Level: Detailed
Add 99377 Cpt? (Important Note: In 2017 The Use Of 56308 Is Being Tracked By Cms To Determine Future Global Period Reimbursement For Global Periods): yes
Quality 357: Surgical Site Infection (Ssi): No surgical site infection
Additional Comments: Follow up at scheduled appointment in March.

## 2025-04-02 ENCOUNTER — APPOINTMENT (OUTPATIENT)
Age: 65
Setting detail: DERMATOLOGY
End: 2025-04-02

## 2025-04-02 DIAGNOSIS — L82.1 OTHER SEBORRHEIC KERATOSIS: ICD-10-CM

## 2025-04-02 PROCEDURE — ? INVENTORY

## 2025-04-02 PROCEDURE — ? LIQUID NITROGEN (COSMETIC)

## 2025-04-02 PROCEDURE — ? COUNSELING

## 2025-04-02 ASSESSMENT — LOCATION SIMPLE DESCRIPTION DERM
LOCATION SIMPLE: LEFT HAND
LOCATION SIMPLE: RIGHT FOREHEAD
LOCATION SIMPLE: RIGHT HAND

## 2025-04-02 ASSESSMENT — LOCATION DETAILED DESCRIPTION DERM
LOCATION DETAILED: LEFT ULNAR DORSAL HAND
LOCATION DETAILED: RIGHT RADIAL DORSAL HAND
LOCATION DETAILED: RIGHT ULNAR DORSAL HAND
LOCATION DETAILED: RIGHT INFERIOR FOREHEAD
LOCATION DETAILED: LEFT RADIAL DORSAL HAND

## 2025-04-02 ASSESSMENT — LOCATION ZONE DERM
LOCATION ZONE: FACE
LOCATION ZONE: HAND

## 2025-04-02 NOTE — HPI: SKIN LESION
PCL completed USARF and made referral to [facility]. Faxed all requested documentation. COVID test pending. PCL will follow-up.  
What Type Of Note Output Would You Prefer (Optional)?: Bullet Format
How Severe Is Your Skin Lesion?: moderate
Has Your Skin Lesion Been Treated?: not been treated
Is This A New Presentation, Or A Follow-Up?: Skin Lesions

## 2025-04-02 NOTE — PROCEDURE: LIQUID NITROGEN (COSMETIC)
Post-Care Instructions: I reviewed with the patient in detail post-care instructions. Patient is to wear sunprotection, and avoid picking at any of the treated lesions. Pt may apply Vaseline to crusted or scabbing areas.
Spray Paint Technique: No
Show Spray Paint Technique Variable?: Yes
Billing Information: Bill by Static Price
Consent: The patient's consent was obtained including but not limited to risks of crusting, scabbing, blistering, scarring, darker or lighter pigmentary change, recurrence, incomplete removal and infection. The patient understands that the procedure is cosmetic in nature and is not covered by insurance.
Spray Paint Text: The liquid nitrogen was applied to the skin utilizing a spray paint frosting technique.
Detail Level: Detailed

## 2025-06-17 ENCOUNTER — APPOINTMENT (OUTPATIENT)
Age: 65
Setting detail: DERMATOLOGY
End: 2025-06-17

## 2025-06-17 DIAGNOSIS — L57.0 ACTINIC KERATOSIS: ICD-10-CM | Status: STABLE

## 2025-06-17 DIAGNOSIS — Z85.828 PERSONAL HISTORY OF OTHER MALIGNANT NEOPLASM OF SKIN: ICD-10-CM | Status: STABLE

## 2025-06-17 PROCEDURE — ? COUNSELING

## 2025-06-17 PROCEDURE — ? OBSERVATION

## 2025-06-17 ASSESSMENT — LOCATION ZONE DERM
LOCATION ZONE: NOSE
LOCATION ZONE: FACE
LOCATION ZONE: SCALP

## 2025-06-17 ASSESSMENT — LOCATION SIMPLE DESCRIPTION DERM
LOCATION SIMPLE: LEFT CHEEK
LOCATION SIMPLE: NOSE
LOCATION SIMPLE: ANTERIOR SCALP

## 2025-06-17 ASSESSMENT — LOCATION DETAILED DESCRIPTION DERM
LOCATION DETAILED: LEFT INFERIOR CENTRAL MALAR CHEEK
LOCATION DETAILED: MID-FRONTAL SCALP
LOCATION DETAILED: NASAL DORSUM

## 2025-06-17 NOTE — PROCEDURE: OBSERVATION
Body Location Override (Optional - Billing Will Still Be Based On Selected Body Map Location If Applicable): nasal dorsum
Detail Level: Detailed
Size Of Lesion In Cm (Optional): 0

## 2025-06-17 NOTE — HPI: NON-MELANOMA SKIN CANCER F/U (HISTORY OF NMSC)
How Many Skin Cancers Have You Had?: one
What Is The Reason For Today's Visit?: Follow Up Non-Melanoma Skin Cancer
When Was Your Last Cancer Diagnosed?: 2025
Additional History: Nasal dorsum
50.5

## 2025-07-25 ENCOUNTER — OFFICE VISIT (OUTPATIENT)
Dept: FAMILY MEDICINE | Facility: CLINIC | Age: 65
End: 2025-07-25

## 2025-07-25 VITALS
DIASTOLIC BLOOD PRESSURE: 84 MMHG | SYSTOLIC BLOOD PRESSURE: 126 MMHG | OXYGEN SATURATION: 98 % | WEIGHT: 171.8 LBS | HEIGHT: 68 IN | BODY MASS INDEX: 26.04 KG/M2 | TEMPERATURE: 97.6 F | HEART RATE: 83 BPM

## 2025-07-25 DIAGNOSIS — E78.5 HYPERLIPIDEMIA LDL GOAL <70: ICD-10-CM

## 2025-07-25 DIAGNOSIS — M85.89 OSTEOPENIA OF MULTIPLE SITES: ICD-10-CM

## 2025-07-25 DIAGNOSIS — I77.819 AORTIC DILATATION: ICD-10-CM

## 2025-07-25 DIAGNOSIS — E03.9 ACQUIRED HYPOTHYROIDISM: ICD-10-CM

## 2025-07-25 DIAGNOSIS — R39.15 URINARY URGENCY: ICD-10-CM

## 2025-07-25 DIAGNOSIS — Z00.00 WELL WOMAN EXAM WITHOUT GYNECOLOGICAL EXAM: Primary | ICD-10-CM

## 2025-07-25 DIAGNOSIS — I77.810 ASCENDING AORTA DILATION: ICD-10-CM

## 2025-07-25 DIAGNOSIS — I10 BENIGN ESSENTIAL HYPERTENSION: ICD-10-CM

## 2025-07-25 LAB
ALBUMIN SERPL-MCNC: 4 G/DL (ref 3.6–5.1)
ALP SERPL-CCNC: 64 U/L (ref 33–130)
ALT 1742-6: 51 U/L (ref 0–32)
AST 1920-8: 35 U/L (ref 0–35)
BILIRUB SERPL-MCNC: 0.5 MG/DL (ref 0.2–1.2)
BUN SERPL-MCNC: 18 MG/DL (ref 7–25)
BUN/CREATININE RATIO: 17 (ref 6–32)
CALCIUM SERPL-MCNC: 9.5 MG/DL (ref 8.6–10.3)
CHLORIDE SERPLBLD-SCNC: 106.3 MMOL/L (ref 98–110)
CHOLEST SERPL-MCNC: 119 MG/DL (ref 0–199)
CHOLEST/HDLC SERPL: 3 {RATIO} (ref 0–5)
CO2 SERPL-SCNC: 28.6 MMOL/L (ref 20–32)
CREAT SERPL-MCNC: 1.04 MG/DL (ref 0.6–1.3)
GLUCOSE SERPL-MCNC: 92 MG/DL (ref 60–99)
HDLC SERPL-MCNC: 43 MG/DL (ref 40–150)
LDLC SERPL CALC-MCNC: 61 MG/DL (ref 0–129)
POTASSIUM SERPL-SCNC: 4.5 MMOL/L (ref 3.5–5.3)
PROT SERPL-MCNC: 6.6 G/DL (ref 6.1–8.1)
SODIUM SERPL-SCNC: 140.2 MMOL/L (ref 135–146)
TRIGL SERPL-MCNC: 75 MG/DL (ref 0–149)

## 2025-07-25 PROCEDURE — 80053 COMPREHEN METABOLIC PANEL: CPT

## 2025-07-25 PROCEDURE — 99397 PER PM REEVAL EST PAT 65+ YR: CPT

## 2025-07-25 PROCEDURE — 80061 LIPID PANEL: CPT

## 2025-07-25 PROCEDURE — 36415 COLL VENOUS BLD VENIPUNCTURE: CPT

## 2025-07-25 RX ORDER — TROSPIUM CHLORIDE 20 MG/1
20 TABLET, FILM COATED ORAL
Qty: 180 TABLET | Refills: 3 | Status: CANCELLED | OUTPATIENT
Start: 2025-07-25

## 2025-07-25 RX ORDER — ROSUVASTATIN CALCIUM 10 MG/1
10 TABLET, COATED ORAL EVERY EVENING
Qty: 90 TABLET | Refills: 3 | Status: SHIPPED | OUTPATIENT
Start: 2025-07-25

## 2025-07-26 LAB — TSH SERPL-ACNC: 1.89 MIU/L (ref 0.4–4.5)

## 2025-07-29 ENCOUNTER — RESULTS FOLLOW-UP (OUTPATIENT)
Dept: FAMILY MEDICINE | Facility: CLINIC | Age: 65
End: 2025-07-29